# Patient Record
Sex: FEMALE | Race: WHITE | HISPANIC OR LATINO | Employment: UNEMPLOYED | URBAN - METROPOLITAN AREA
[De-identification: names, ages, dates, MRNs, and addresses within clinical notes are randomized per-mention and may not be internally consistent; named-entity substitution may affect disease eponyms.]

---

## 2017-03-11 ENCOUNTER — HOSPITAL ENCOUNTER (EMERGENCY)
Facility: HOSPITAL | Age: 32
Discharge: HOME | End: 2017-03-12
Attending: EMERGENCY MEDICINE | Admitting: EMERGENCY MEDICINE
Payer: COMMERCIAL

## 2017-03-11 DIAGNOSIS — R68.89 FLU-LIKE SYMPTOMS: Primary | ICD-10-CM

## 2017-03-11 LAB
ALBUMIN SERPL BCP-MCNC: 3.9 G/DL (ref 3.5–5)
ALP SERPL-CCNC: 76 U/L (ref 46–116)
ALT SERPL W P-5'-P-CCNC: 38 U/L (ref 12–78)
ANION GAP SERPL CALCULATED.3IONS-SCNC: 10 MMOL/L (ref 4–13)
AST SERPL W P-5'-P-CCNC: 23 U/L (ref 5–45)
BASOPHILS # BLD AUTO: 0.07 THOUSANDS/ΜL (ref 0–0.1)
BASOPHILS NFR BLD AUTO: 1 % (ref 0–1)
BILIRUB SERPL-MCNC: 0.4 MG/DL (ref 0.2–1)
BILIRUB UR QL STRIP: NEGATIVE
BUN SERPL-MCNC: 14 MG/DL (ref 5–25)
CALCIUM SERPL-MCNC: 8.5 MG/DL (ref 8.3–10.1)
CHLORIDE SERPL-SCNC: 104 MMOL/L (ref 100–108)
CLARITY UR: CLEAR
CO2 SERPL-SCNC: 25 MMOL/L (ref 21–32)
COLOR UR: YELLOW
CREAT SERPL-MCNC: 0.68 MG/DL (ref 0.6–1.3)
EOSINOPHIL # BLD AUTO: 0.28 THOUSAND/ΜL (ref 0–0.61)
EOSINOPHIL NFR BLD AUTO: 3 % (ref 0–6)
ERYTHROCYTE [DISTWIDTH] IN BLOOD BY AUTOMATED COUNT: 13.7 % (ref 11.6–15.1)
GFR SERPL CREATININE-BSD FRML MDRD: >60 ML/MIN/1.73SQ M
GLUCOSE SERPL-MCNC: 90 MG/DL (ref 65–140)
GLUCOSE UR STRIP-MCNC: NEGATIVE MG/DL
HCG UR QL: NEGATIVE
HCT VFR BLD AUTO: 36.6 % (ref 34.8–46.1)
HGB BLD-MCNC: 12.2 G/DL (ref 11.5–15.4)
HGB UR QL STRIP.AUTO: NEGATIVE
KETONES UR STRIP-MCNC: NEGATIVE MG/DL
LEUKOCYTE ESTERASE UR QL STRIP: NEGATIVE
LIPASE SERPL-CCNC: 141 U/L (ref 73–393)
LYMPHOCYTES # BLD AUTO: 2.22 THOUSANDS/ΜL (ref 0.6–4.47)
LYMPHOCYTES NFR BLD AUTO: 20 % (ref 14–44)
MCH RBC QN AUTO: 27 PG (ref 26.8–34.3)
MCHC RBC AUTO-ENTMCNC: 33.3 G/DL (ref 31.4–37.4)
MCV RBC AUTO: 81 FL (ref 82–98)
MONOCYTES # BLD AUTO: 1.29 THOUSAND/ΜL (ref 0.17–1.22)
MONOCYTES NFR BLD AUTO: 11 % (ref 4–12)
NEUTROPHILS # BLD AUTO: 7.51 THOUSANDS/ΜL (ref 1.85–7.62)
NEUTS SEG NFR BLD AUTO: 66 % (ref 43–75)
NITRITE UR QL STRIP: NEGATIVE
NRBC BLD AUTO-RTO: 0 /100 WBCS
PH UR STRIP.AUTO: 6 [PH] (ref 4.5–8)
PLATELET # BLD AUTO: 230 THOUSANDS/UL (ref 149–390)
PMV BLD AUTO: 10.7 FL (ref 8.9–12.7)
POTASSIUM SERPL-SCNC: 3.8 MMOL/L (ref 3.5–5.3)
PROT SERPL-MCNC: 7.2 G/DL (ref 6.4–8.2)
PROT UR STRIP-MCNC: NEGATIVE MG/DL
RBC # BLD AUTO: 4.52 MILLION/UL (ref 3.81–5.12)
SODIUM SERPL-SCNC: 139 MMOL/L (ref 136–145)
SP GR UR STRIP.AUTO: 1.02 (ref 1–1.03)
UROBILINOGEN UR QL STRIP.AUTO: 0.2 E.U./DL
WBC # BLD AUTO: 11.41 THOUSAND/UL (ref 4.31–10.16)

## 2017-03-11 PROCEDURE — 81003 URINALYSIS AUTO W/O SCOPE: CPT | Performed by: EMERGENCY MEDICINE

## 2017-03-11 PROCEDURE — 81025 URINE PREGNANCY TEST: CPT | Performed by: EMERGENCY MEDICINE

## 2017-03-11 PROCEDURE — 83690 ASSAY OF LIPASE: CPT | Performed by: EMERGENCY MEDICINE

## 2017-03-11 PROCEDURE — 80053 COMPREHEN METABOLIC PANEL: CPT | Performed by: EMERGENCY MEDICINE

## 2017-03-11 PROCEDURE — 96374 THER/PROPH/DIAG INJ IV PUSH: CPT

## 2017-03-11 PROCEDURE — 36415 COLL VENOUS BLD VENIPUNCTURE: CPT | Performed by: EMERGENCY MEDICINE

## 2017-03-11 PROCEDURE — 85025 COMPLETE CBC W/AUTO DIFF WBC: CPT | Performed by: EMERGENCY MEDICINE

## 2017-03-11 PROCEDURE — 96375 TX/PRO/DX INJ NEW DRUG ADDON: CPT

## 2017-03-11 RX ORDER — LORAZEPAM 2 MG/ML
1 INJECTION INTRAMUSCULAR ONCE
Status: COMPLETED | OUTPATIENT
Start: 2017-03-11 | End: 2017-03-11

## 2017-03-11 RX ORDER — SULFASALAZINE 500 MG/1
500 TABLET ORAL 2 TIMES DAILY
COMMUNITY
End: 2017-08-21

## 2017-03-11 RX ORDER — MORPHINE SULFATE 10 MG/ML
6 INJECTION, SOLUTION INTRAMUSCULAR; INTRAVENOUS ONCE
Status: COMPLETED | OUTPATIENT
Start: 2017-03-11 | End: 2017-03-11

## 2017-03-11 RX ADMIN — LORAZEPAM 1 MG: 2 INJECTION INTRAMUSCULAR; INTRAVENOUS at 23:28

## 2017-03-11 RX ADMIN — MORPHINE SULFATE 6 MG: 10 INJECTION, SOLUTION INTRAMUSCULAR; INTRAVENOUS at 23:28

## 2017-03-12 VITALS
HEIGHT: 63 IN | DIASTOLIC BLOOD PRESSURE: 73 MMHG | TEMPERATURE: 98 F | OXYGEN SATURATION: 100 % | RESPIRATION RATE: 17 BRPM | HEART RATE: 69 BPM | WEIGHT: 125.88 LBS | SYSTOLIC BLOOD PRESSURE: 114 MMHG | BODY MASS INDEX: 22.3 KG/M2

## 2017-03-12 PROCEDURE — 99284 EMERGENCY DEPT VISIT MOD MDM: CPT

## 2017-03-12 RX ORDER — NAPROXEN 500 MG/1
500 TABLET ORAL 2 TIMES DAILY WITH MEALS
Qty: 14 TABLET | Refills: 0 | Status: SHIPPED | OUTPATIENT
Start: 2017-03-12 | End: 2017-08-21

## 2017-08-21 ENCOUNTER — HOSPITAL ENCOUNTER (EMERGENCY)
Facility: HOSPITAL | Age: 32
Discharge: HOME | End: 2017-08-21
Attending: EMERGENCY MEDICINE
Payer: COMMERCIAL

## 2017-08-21 VITALS
WEIGHT: 124.78 LBS | TEMPERATURE: 98.3 F | HEART RATE: 83 BPM | DIASTOLIC BLOOD PRESSURE: 82 MMHG | SYSTOLIC BLOOD PRESSURE: 120 MMHG | RESPIRATION RATE: 16 BRPM | BODY MASS INDEX: 22.1 KG/M2 | OXYGEN SATURATION: 98 %

## 2017-08-21 DIAGNOSIS — T78.40XA ALLERGIC REACTION, INITIAL ENCOUNTER: Primary | ICD-10-CM

## 2017-08-21 PROCEDURE — 96375 TX/PRO/DX INJ NEW DRUG ADDON: CPT

## 2017-08-21 PROCEDURE — 96372 THER/PROPH/DIAG INJ SC/IM: CPT

## 2017-08-21 PROCEDURE — 96361 HYDRATE IV INFUSION ADD-ON: CPT

## 2017-08-21 PROCEDURE — 96374 THER/PROPH/DIAG INJ IV PUSH: CPT

## 2017-08-21 PROCEDURE — 99284 EMERGENCY DEPT VISIT MOD MDM: CPT

## 2017-08-21 RX ORDER — DIPHENHYDRAMINE HYDROCHLORIDE 50 MG/ML
25 INJECTION INTRAMUSCULAR; INTRAVENOUS ONCE
Status: COMPLETED | OUTPATIENT
Start: 2017-08-21 | End: 2017-08-21

## 2017-08-21 RX ORDER — DIPHENHYDRAMINE HYDROCHLORIDE 50 MG/ML
50 INJECTION INTRAMUSCULAR; INTRAVENOUS ONCE
Status: DISCONTINUED | OUTPATIENT
Start: 2017-08-21 | End: 2017-08-21

## 2017-08-21 RX ORDER — METHYLPREDNISOLONE SODIUM SUCCINATE 125 MG/2ML
125 INJECTION, POWDER, LYOPHILIZED, FOR SOLUTION INTRAMUSCULAR; INTRAVENOUS ONCE
Status: COMPLETED | OUTPATIENT
Start: 2017-08-21 | End: 2017-08-21

## 2017-08-21 RX ORDER — EPINEPHRINE 0.3 MG/.3ML
0.3 INJECTION SUBCUTANEOUS ONCE
Qty: 2 EACH | Refills: 0 | Status: SHIPPED | OUTPATIENT
Start: 2017-08-21 | End: 2019-11-16

## 2017-08-21 RX ADMIN — SODIUM CHLORIDE 500 ML: 0.9 INJECTION, SOLUTION INTRAVENOUS at 19:05

## 2017-08-21 RX ADMIN — METHYLPREDNISOLONE SODIUM SUCCINATE 125 MG: 125 INJECTION, POWDER, FOR SOLUTION INTRAMUSCULAR; INTRAVENOUS at 19:04

## 2017-08-21 RX ADMIN — EPINEPHRINE 0.3 MG: 1 INJECTION, SOLUTION INTRAMUSCULAR; SUBCUTANEOUS at 19:04

## 2017-08-21 RX ADMIN — FAMOTIDINE 20 MG: 10 INJECTION, SOLUTION INTRAVENOUS at 19:08

## 2017-08-21 RX ADMIN — DIPHENHYDRAMINE HYDROCHLORIDE 25 MG: 50 INJECTION, SOLUTION INTRAMUSCULAR; INTRAVENOUS at 19:14

## 2017-10-14 ENCOUNTER — APPOINTMENT (EMERGENCY)
Dept: RADIOLOGY | Facility: HOSPITAL | Age: 32
End: 2017-10-14
Payer: COMMERCIAL

## 2017-10-14 ENCOUNTER — HOSPITAL ENCOUNTER (EMERGENCY)
Facility: HOSPITAL | Age: 32
Discharge: HOME | End: 2017-10-15
Attending: EMERGENCY MEDICINE
Payer: COMMERCIAL

## 2017-10-14 DIAGNOSIS — G43.909 MIGRAINE: Primary | ICD-10-CM

## 2017-10-14 DIAGNOSIS — R07.89 CHEST PAIN, NON-CARDIAC: ICD-10-CM

## 2017-10-14 LAB
BASOPHILS # BLD AUTO: 0.05 THOUSANDS/ΜL (ref 0–0.1)
BASOPHILS NFR BLD AUTO: 1 % (ref 0–1)
EOSINOPHIL # BLD AUTO: 0.26 THOUSAND/ΜL (ref 0–0.61)
EOSINOPHIL NFR BLD AUTO: 3 % (ref 0–6)
ERYTHROCYTE [DISTWIDTH] IN BLOOD BY AUTOMATED COUNT: 14.1 % (ref 11.6–15.1)
HCT VFR BLD AUTO: 34.3 % (ref 34.8–46.1)
HGB BLD-MCNC: 11.4 G/DL (ref 11.5–15.4)
LYMPHOCYTES # BLD AUTO: 2.63 THOUSANDS/ΜL (ref 0.6–4.47)
LYMPHOCYTES NFR BLD AUTO: 30 % (ref 14–44)
MCH RBC QN AUTO: 26.8 PG (ref 26.8–34.3)
MCHC RBC AUTO-ENTMCNC: 33.2 G/DL (ref 31.4–37.4)
MCV RBC AUTO: 81 FL (ref 82–98)
MONOCYTES # BLD AUTO: 0.71 THOUSAND/ΜL (ref 0.17–1.22)
MONOCYTES NFR BLD AUTO: 8 % (ref 4–12)
NEUTROPHILS # BLD AUTO: 5.22 THOUSANDS/ΜL (ref 1.85–7.62)
NEUTS SEG NFR BLD AUTO: 59 % (ref 43–75)
NRBC BLD AUTO-RTO: 0 /100 WBCS
PLATELET # BLD AUTO: 292 THOUSANDS/UL (ref 149–390)
PMV BLD AUTO: 10.5 FL (ref 8.9–12.7)
RBC # BLD AUTO: 4.25 MILLION/UL (ref 3.81–5.12)
WBC # BLD AUTO: 8.89 THOUSAND/UL (ref 4.31–10.16)

## 2017-10-14 PROCEDURE — 96361 HYDRATE IV INFUSION ADD-ON: CPT

## 2017-10-14 PROCEDURE — 80053 COMPREHEN METABOLIC PANEL: CPT | Performed by: EMERGENCY MEDICINE

## 2017-10-14 PROCEDURE — 71020 HB CHEST X-RAY 2VW FRONTAL&LATL: CPT

## 2017-10-14 PROCEDURE — 36415 COLL VENOUS BLD VENIPUNCTURE: CPT | Performed by: EMERGENCY MEDICINE

## 2017-10-14 PROCEDURE — 93005 ELECTROCARDIOGRAM TRACING: CPT | Performed by: EMERGENCY MEDICINE

## 2017-10-14 PROCEDURE — 96374 THER/PROPH/DIAG INJ IV PUSH: CPT

## 2017-10-14 PROCEDURE — 84484 ASSAY OF TROPONIN QUANT: CPT | Performed by: EMERGENCY MEDICINE

## 2017-10-14 PROCEDURE — 96375 TX/PRO/DX INJ NEW DRUG ADDON: CPT

## 2017-10-14 PROCEDURE — 85025 COMPLETE CBC W/AUTO DIFF WBC: CPT | Performed by: EMERGENCY MEDICINE

## 2017-10-14 RX ORDER — GABAPENTIN 100 MG/1
100 CAPSULE ORAL 3 TIMES DAILY
COMMUNITY
End: 2018-06-02

## 2017-10-14 RX ORDER — ONDANSETRON 2 MG/ML
4 INJECTION INTRAMUSCULAR; INTRAVENOUS ONCE AS NEEDED
Status: COMPLETED | OUTPATIENT
Start: 2017-10-14 | End: 2017-10-14

## 2017-10-14 RX ORDER — KETOROLAC TROMETHAMINE 30 MG/ML
15 INJECTION, SOLUTION INTRAMUSCULAR; INTRAVENOUS ONCE
Status: COMPLETED | OUTPATIENT
Start: 2017-10-14 | End: 2017-10-14

## 2017-10-14 RX ORDER — DIPHENHYDRAMINE HYDROCHLORIDE 50 MG/ML
25 INJECTION INTRAMUSCULAR; INTRAVENOUS ONCE
Status: COMPLETED | OUTPATIENT
Start: 2017-10-14 | End: 2017-10-14

## 2017-10-14 RX ORDER — METOCLOPRAMIDE HYDROCHLORIDE 5 MG/ML
10 INJECTION INTRAMUSCULAR; INTRAVENOUS ONCE
Status: COMPLETED | OUTPATIENT
Start: 2017-10-14 | End: 2017-10-14

## 2017-10-14 RX ADMIN — DIPHENHYDRAMINE HYDROCHLORIDE 25 MG: 50 INJECTION, SOLUTION INTRAMUSCULAR; INTRAVENOUS at 23:48

## 2017-10-14 RX ADMIN — METOCLOPRAMIDE 10 MG: 5 INJECTION, SOLUTION INTRAMUSCULAR; INTRAVENOUS at 23:46

## 2017-10-14 RX ADMIN — SODIUM CHLORIDE 1000 ML: 0.9 INJECTION, SOLUTION INTRAVENOUS at 23:42

## 2017-10-14 RX ADMIN — KETOROLAC TROMETHAMINE 15 MG: 30 INJECTION, SOLUTION INTRAMUSCULAR at 23:49

## 2017-10-14 RX ADMIN — ONDANSETRON 4 MG: 2 INJECTION INTRAMUSCULAR; INTRAVENOUS at 23:44

## 2017-10-15 VITALS
SYSTOLIC BLOOD PRESSURE: 121 MMHG | BODY MASS INDEX: 22.62 KG/M2 | HEIGHT: 63 IN | DIASTOLIC BLOOD PRESSURE: 75 MMHG | WEIGHT: 127.65 LBS | OXYGEN SATURATION: 100 % | TEMPERATURE: 98.3 F | HEART RATE: 63 BPM | RESPIRATION RATE: 17 BRPM

## 2017-10-15 LAB
ALBUMIN SERPL BCP-MCNC: 3.9 G/DL (ref 3.5–5)
ALP SERPL-CCNC: 69 U/L (ref 46–116)
ALT SERPL W P-5'-P-CCNC: 25 U/L (ref 12–78)
ANION GAP SERPL CALCULATED.3IONS-SCNC: 6 MMOL/L (ref 4–13)
AST SERPL W P-5'-P-CCNC: 21 U/L (ref 5–45)
ATRIAL RATE: 70 BPM
ATRIAL RATE: 76 BPM
BILIRUB SERPL-MCNC: 0.6 MG/DL (ref 0.2–1)
BUN SERPL-MCNC: 11 MG/DL (ref 5–25)
CALCIUM SERPL-MCNC: 8.4 MG/DL (ref 8.3–10.1)
CHLORIDE SERPL-SCNC: 104 MMOL/L (ref 100–108)
CO2 SERPL-SCNC: 28 MMOL/L (ref 21–32)
CREAT SERPL-MCNC: 0.72 MG/DL (ref 0.6–1.3)
GFR SERPL CREATININE-BSD FRML MDRD: 110 ML/MIN/1.73SQ M
GLUCOSE SERPL-MCNC: 101 MG/DL (ref 65–140)
P AXIS: 76 DEGREES
P AXIS: 82 DEGREES
POTASSIUM SERPL-SCNC: 3.6 MMOL/L (ref 3.5–5.3)
PR INTERVAL: 150 MS
PR INTERVAL: 152 MS
PROT SERPL-MCNC: 7.2 G/DL (ref 6.4–8.2)
QRS AXIS: 59 DEGREES
QRS AXIS: 68 DEGREES
QRSD INTERVAL: 100 MS
QRSD INTERVAL: 98 MS
QT INTERVAL: 384 MS
QT INTERVAL: 386 MS
QTC INTERVAL: 414 MS
QTC INTERVAL: 434 MS
SODIUM SERPL-SCNC: 138 MMOL/L (ref 136–145)
T WAVE AXIS: 43 DEGREES
T WAVE AXIS: 55 DEGREES
TROPONIN I SERPL-MCNC: <0.02 NG/ML
TROPONIN I SERPL-MCNC: <0.02 NG/ML
VENTRICULAR RATE: 70 BPM
VENTRICULAR RATE: 76 BPM

## 2017-10-15 PROCEDURE — 99285 EMERGENCY DEPT VISIT HI MDM: CPT

## 2017-10-15 PROCEDURE — 84484 ASSAY OF TROPONIN QUANT: CPT | Performed by: EMERGENCY MEDICINE

## 2017-10-15 PROCEDURE — 36415 COLL VENOUS BLD VENIPUNCTURE: CPT | Performed by: EMERGENCY MEDICINE

## 2017-10-15 PROCEDURE — 96361 HYDRATE IV INFUSION ADD-ON: CPT

## 2017-10-15 PROCEDURE — 93005 ELECTROCARDIOGRAM TRACING: CPT | Performed by: EMERGENCY MEDICINE

## 2017-10-15 NOTE — DISCHARGE INSTRUCTIONS
Please use Tylenol, ibuprofen at home for control of your pain  Please follow-up with primary care provider regarding care for your migraines  Chest Wall Pain, Ambulatory Care   GENERAL INFORMATION:   Chest wall pain  may be caused by problems with the muscles, cartilage, or bones of the chest wall  Chest wall pain may also be caused by pain that spreads to your chest from another part of your body  The pain may be aching, severe, dull, or sharp  It may come and go, or it may be constant  The pain may be worse when you move in certain ways, breathe deeply, or cough  Seek immediate care for the following symptoms:   · Severe pain    · You have any of the following signs of a heart attack:      ¨ Squeezing, pressure, or pain in your chest that lasts longer than 5 minutes or returns    ¨ Discomfort or pain in your back, neck, jaw, stomach, or arm     ¨ Trouble breathing    ¨ Nausea or vomiting    ¨ Lightheadedness or a sudden cold sweat, especially with chest pain or trouble breathing  Treatment  depends on the cause of your chest wall pain  You may need any of the following:  · NSAIDs  help decrease swelling and pain or fever  This medicine is available with or without a doctor's order  NSAIDs can cause stomach bleeding or kidney problems in certain people  If you take blood thinner medicine, always ask your healthcare provider if NSAIDs are safe for you  Always read the medicine label and follow directions  · Acetaminophen  decreases pain  It is available without a doctor's order  Ask how much to take and how often to take it  Follow directions  Acetaminophen can cause liver damage if not taken correctly  · Apply heat  on your chest for 20 to 30 minutes every 2 hours for as many days as directed  Heat helps decrease pain and muscle spasms  · Apply ice  on your chest for 15 to 20 minutes every hour or as directed  Use an ice pack, or put crushed ice in a plastic bag  Cover it with a towel   Ice helps prevent tissue damage and decreases swelling and pain  Follow up with your healthcare provider as directed:  Write down your questions so you remember to ask them during your visits  CARE AGREEMENT:   You have the right to help plan your care  Learn about your health condition and how it may be treated  Discuss treatment options with your caregivers to decide what care you want to receive  You always have the right to refuse treatment  The above information is an  only  It is not intended as medical advice for individual conditions or treatments  Talk to your doctor, nurse or pharmacist before following any medical regimen to see if it is safe and effective for you  © 2014 [address] is for End User's use only and may not be sold, redistributed or otherwise used for commercial purposes  All illustrations and images included in CareNotes® are the copyrighted property of [employer]  or Grant Holliday  Migraine Headache   WHAT YOU NEED TO KNOW:   What is a migraine headache? A migraine is a severe headache  The pain can be so severe that it interferes with your daily activities  A migraine can last a few hours up to several days  The exact cause of migraines is not known  What can trigger a migraine headache? · Stress, eye strain, oversleeping, or not getting enough sleep    · Hormone changes in women from birth control pills, pregnancy, menopause, or during a monthly period    · Skipping meals, going too long without eating, or not drinking enough liquids    · Certain foods or drinks such as chocolate, hard cheese, red wine, or drinks that contain caffeine    · Foods that contain gluten, nitrates, MSG, or artificial sweeteners    · Sunlight, bright or flashing lights, loud noises, smoke, or strong smells    · Heat, humidity, or changes in the weather  What are the warning signs that a migraine headache is about to start?   Warning signs usually start 15 to 60 minutes before the headache:  · Visual changes (auras), such as blurred vision, temporary blind or bright spots, lines, or hallucinations    · Unusual tiredness or frequent yawning    · Tingling in an arm or leg  What are the signs and symptoms of a migraine headache? A migraine headache usually begins as a dull ache around the eye or temple  The pain may get worse with movement  You may also have the following:  · Pain in your head that may increase to the point that you cannot do everyday activities    · Pain on one or both sides of your head    · Throbbing, pulsing, or pounding pain in your head    · Nausea and vomiting    · Sensitivity to light, noise, or smells  How is a migraine headache diagnosed? Your healthcare provider will ask questions about your headaches  Describe the pain and any other symptoms, such as nausea  Tell the provider if you think anything triggered the pain  The provider will also want to know what you ate and drank before the pain started  Tell the provider about any medical conditions you have or that run in your family  Include any recent stressors you have had  You may also need any of the following:  · A neurologic exam  is used to check how your pupils react to light  Your healthcare provider may check your memory, hand grasp, and balance  · CT or MRI pictures  may be taken of your brain  You may be given contrast liquid to help your brain show up better in the pictures  Tell the healthcare provider if you have ever had an allergic reaction to contrast liquid  Do not enter the MRI room with anything metal  Metal can cause serious injury  Tell the healthcare provider if you have any metal in or on your body  How is a migraine headache treated? Migraines cannot be cured  The goal of treatment is to reduce your symptoms  Take medicine as soon as you feel a migraine begin  · Prescription pain medicine  may be given   Do not wait until the pain is severe before you take your medicine  · Migraine medicines  are used to help prevent a migraine or stop it once it starts  · Antinausea medicine  may be given to calm your stomach and to help prevent vomiting  This medicine can also help relieve pain  What can I do to manage my symptoms? · Rest in a dark, quiet room  This will help decrease your pain  Sleep may also help relieve the pain  · Apply ice to decrease pain  Use an ice pack, or put crushed ice in a plastic bag  Cover the ice pack with a towel and place it on your head  Apply ice for 15 to 20 minutes every hour  · Apply heat to decrease pain and muscle spasms  Use a small towel dampened with warm water or a heating pad, or sit in a warm bath  Apply heat on the area for 20 to 30 minutes every 2 hours  You may alternate heat and ice  · Keep a migraine record  Write down when your migraines start and stop  Include your symptoms and what you were doing when a migraine began  Record what you ate or drank for 24 hours before the migraine started  Keep track of what you did to treat your migraine and if it worked  Bring the migraine record with you to visits with your healthcare provider  What can I do to prevent another migraine headache? · Do not smoke  Nicotine and other chemicals in cigarettes and cigars can trigger a migraine or make it worse  Ask your healthcare provider for information if you currently smoke and need help to quit  E-cigarettes or smokeless tobacco still contain nicotine  Talk to your healthcare provider before you use these products  · Do not drink alcohol  Alcohol can trigger a migraine  It can also keep medicines used to treat your migraines from working  · Get regular exercise  Exercise may help prevent migraines  Talk to your healthcare provider about the best exercise plan for you  Try to get at least 30 minutes of exercise on most days  · Manage stress  Stress may trigger a migraine   Learn new ways to relax, such as deep breathing  · Create a sleep schedule  Go to bed and get up at the same times each day  Do not watch television before bed  · Eat regular meals  Include healthy foods such as include fruit, vegetables, whole-grain breads, low-fat dairy products, beans, lean meat, and fish  Do not have food or drinks that trigger your migraines  When should I seek immediate care? · You have a headache that seems different or much worse than your usual migraine headache  · You have a severe headache with a fever or a stiff neck  · You have new problems with speech, vision, balance, or movement  · You feel like you are going to faint, you become confused, or you have a seizure  When should I contact my healthcare provider? · Your migraines interfere with your daily activities  · Your medicines or treatments stop working  · You have questions or concerns about your condition or care  CARE AGREEMENT:   You have the right to help plan your care  Learn about your health condition and how it may be treated  Discuss treatment options with your caregivers to decide what care you want to receive  You always have the right to refuse treatment  The above information is an  only  It is not intended as medical advice for individual conditions or treatments  Talk to your doctor, nurse or pharmacist before following any medical regimen to see if it is safe and effective for you  © 2017 [address] Information is for End User's use only and may not be sold, redistributed or otherwise used for commercial purposes  All illustrations and images included in CareNotes® are the copyrighted property of [employer]  or [address]  Dolor en la pared torácica   LO QUE NECESITA SABER:   ¿Qué necesito saber sobre el dolor de la pared torácica?   El dolor de la pared torácica puede ser causado por problemas con los músculos, cartílago o huesos de la pared torácica  El dolor de la pared torácica también puede ser causado por dolor que se propaga a mercer pecho y que proviene de otra parte de mercer cuerpo  El dolor puede ser persistente, severo, leve o eli  Puede ser intermitente (va y viene) o puede ser persistente  El dolor también puede ser peor cuando usted se mueve de ciertas formas, respira profundo o tose  ¿Qué causa el dolor de la pared torácica? · Condiciones que afectan las articulaciones o el cartílago de la pared torácica, jemma la artritis o costocondritis    · Presión o lesión en los músculos de la pared torácica     · Fracturas de las costillas o vértebras (huesos de la columna vertebral)    · Hernias discales en la sección superior o media de mercer columna vertebral     · Culebrilla  ¿Cómo se diagnostica el dolor de la pared torácica? Mercer médico le pedirá que describa mercer dolor  Dígale cuando empezó el dolor, qué tipo de dolor es y qué es lo que mejora o empeora el dolor  Él también le preguntará si tiene otros síntomas  Pete Phy el tórax  Es probable que [employer] pida que mueva anuj brazos en direcciones distintas para hill cómo afectan mercer dolor  Pregúntele a mercer médico sobre estos y otros exámenes que usted pueda necesitar:  · Olson Conradi de tórax  pueden mostrar la causa de mercer dolor de la pared torácica  Es probable que usted necesite más de Wesley Conradi  · Maicol resonancia magnética (RM)  sincere imágenes de mercer pecho o columna vertebral para mostrar la causa de mercer dolor  Es posible que le administren un líquido de contraste para que las imágenes se aprecien mejor  Informe a mercer médico si alguna vez ha tenido maicol reacción alérgica al medio de [place]  No entre a la peter donde se realiza la resonancia magnética con algo de metal  El metal puede causar lesiones serias  Informe a mercer médico si tiene algún metal dentro o sobre mercer cuerpo  ¿Cómo se trata el dolor de la pared torácica? El tratamiento depende la causa de mercer dolor   Es posible que usted necesite alguno de los siguientes:  · AINEs (Analgésicos antiinflamatorios no esteroides) jemma el ibuprofeno, ayudan a disminuir la inflamación, el dolor y la fiebre  Imani medicamento esta disponible con o sin celia receta médica  Los AINEs pueden causar sangrado estomacal o problemas renales en ciertas personas  Si usted sincere un medicamento anticoagulante, siempre pregúntele a mercer médico si los JOSHUA son seguros para usted  Siempre benji la etiqueta de imani medicamento y [place] instrucciones  · El acetaminofén  [employer]  Está disponible sin receta médica  Pregunte la cantidad y la frecuencia con que debe tomarlos  [facility]  El acetaminofén puede causar daño en el hígado cuando no se sincree de forma correcta  · Celia crema  se puede aplicar en mercer pecho para aliviar el dolor  · La aplicación de calor  a mercer pecho de 20 a 30 minutos cada 2 horas por los [employer] indiquen  El calor ayuda a disminuir el dolor y los espasmos musculares  · Aplique hielo  a mercer pecho de 15 a 20 minutos cada hora según indicaciones  Use un paquete de hielo o ponga hielo molido dentro de [employer]  Cúbrala con celia toalla  El hielo ayuda a evitar daño al tejido y a disminuir la inflamación y el dolor  Llame al 911 si presenta:   · Usted tiene alguno de los siguientes signos de un ataque cardíaco:      ¨ Estornudos, presión, o dolor en mercer pecho que dura mas de 5 minutos o regresa  ¨ Malestar o dolor en mercer espalda, blanche, mandíbula, abdomen, o brazo     ¨ Dificultad para respirar    ¨ Náuseas o vómito    ¨ Siente un desvanecimiento o tiene sudores fríos especialmente en el pecho o dificultad para respirar  ¿Cuándo scott buscar atención inmediata? · Usted tiene dolor intenso  ¿Cuándo scott comunicarme con mi médico?   · Usted desarrolla un sarpullido  · Usted tiene otros síntomas nuevos  · Mercer dolor no mejora, aún con tratamiento      · Usted tiene preguntas o inquietudes acerca de mercer condición o Angel Kaiser  ACUERDOS SOBRE MEDEROS CUIDADO:   Usted tiene el derecho de ayudar a planear mederos cuidado  Aprenda todo lo que pueda sobre mederos condición y jemma darle tratamiento  Discuta anuj opciones de tratamiento con anuj médicos para decidir el cuidado que usted desea recibir  Usted siempre tiene el derecho de rechazar el tratamiento  Esta información es sólo para uso en educación  Mederos intención no es darle un consejo médico sobre enfermedades o tratamientos  Colsulte con mederos Alben Teressa farmacéutico antes de seguir cualquier régimen médico para saber si es seguro y efectivo para usted  © 2017 [address] Information is for End User's use only and may not be sold, redistributed or otherwise used for commercial purposes  All illustrations and images included in CareNotes® are the copyrighted property of [employer] A M [employer] Inc  or Grant Holliday  Migraña   LO QUE NECESITA SABER:   ¿Qué es maicol Gladys Haste? Mcdaniel es un dolor de manuel intenso  El dolor puede ser tan severo que interfiere con anuj actividades cotidianas  Mcdaniel puede durar desde pocas horas hasta varios días  La causa exacta de la migraña no es conocida  ¿Qué puede desencadenar maicol Gladys Haste? · El estrés, fatiga de los ojos, dormir demasiado o no dormir lo suficiente    · [employer] debido a las píldoras anticonceptivas, embarazo, menopausia o darrick la menstruación    · Omitir comidas, pasar mucho tiempo sin comer o no lu suficientes líquidos    · Ciertos alimentos o bebidas jemma el chocolate, el queso darcy, vino tinto o bebidas que contienen cafeína    · Alimentos que contienen gluten, nitratos, glutamato monosódico o endulzantes artificiales    · [employer], luces brillantes o luces parpadeantes, ruidos dixie, humo u olores dixie    · Calor, humedad o cambios en el clima  ¿Cuáles son las señales de advertencia de que maicol migraña está por comenzar?   Los signos de advertencia generalmente comienzan de 15 a 60 minutos antes del dolor de manuel:  · Cambios visuales (auras), jemma visión borrosa, ceguera temporal o manchas brillantes, líneas o alucinaciones    · Cansancio anormal o bostezos frecuentes    · Hormigueo en mercer brazo o pierna  ¿Cuáles son los signos y síntomas de Job Early migraña? La migraña comienza generalmente con un dolor monótono alrededor del lencho o la sien  El dolor podría empeorar con el movimiento  Usted también podría tener lo siguiente:  · Dolor en mercer manuel que podría aumentar hasta el punto que usted no es capaz de realizar anuj actividades cotidianas    · Dolor en natanael o ambos lados de mercer manuel    · Dolor punzante, pulsante o satinder en la manuel    · Náuseas y vómitos    · Sensibilidad a la sera, el ruido o los olores  ¿Cómo se diagnostica maicol Verlinda Splinter? Mercer médico le hará preguntas acerca de anuj yenny de [place]  Leontine Lock dolor y otros síntomas, jemma náuseas  Infórmele al médico si usted genoveva que hubo algún desencadenante del dolor  El médico también querrá saber qué fue usted comió y tomó antes que le empezara el dolor  Infórmele al médico sobre cualquier afección médica que usted tenga o sea característica de mercer karla  Incluya cualquier estresor reciente que ha tenido  Es posible que también necesite alguno de los siguientes tratamientos:  · Un examen neurológico  se [place] para revisar cómo reaccionan las pupilas a la sera  Mercer médico podría revisar mercer memoria, la forma en que agarra las cosas con mercer mano y el equilibrio  · Las imágenes por tomografía computarizada o por resonancia magnética  podrían lu imágenes de mercer cerebro  Es posible que le administren un medio de contraste que sirve para que el cerebro se observe con mayor claridad en las imágenes  Dígale al médico si usted alguna vez ha tenido maicol reacción alérgica al líquido de [place]  No entre a la peter donde se realiza la resonancia magnética con algo de metal  El metal puede causar lesiones serias   Dígale al médico si usted tiene algo de metal por dentro o sobre mercer cuerpo  ¿Cómo se trata Rosamaria Mooneyier? Las migrañas no [place]  La meta del tratamiento es reducir anuj síntomas  [place] medicamento tan pronto jemma sienta que le comienza Rosamaria Sangeeta  · Un medicamento con receta para el dolor  podrían ser [place]  No espere a que el dolor sea muy intenso para lu el medicamento  · Medicamentos para la migraña  se [place] para evitar maicol migraña o detenerla maicol vez que comience  · Los medicamentos contra las náuseas  pueden darse para calmar mercer estómago y ayudarle a prevenir los vómitos  [place] medicamento también puede aliviar el dolor  ¿Qué puedo hacer para manejar mis síntomas? · Repose en maicol habitación oscura y [place] ayudará a disminuir el dolor  El dormir también podría ayudarlo a aliviar mercer dolor  · Aplique hielo para reducir el dolor  Use un paquete de hielo o ponga hielo molido dentro de [employer]  Cubra el paquete de hielo con maicol toalla y colóqueselo en la manuel  Aplique hielo darrick 15 a 20 minutos cada hora  · Aplique calor para disminuir el dolor y los espasmos musculares  Utilice maicol toalla pequeña empapada con [place], maicol almohada térmica o tome un baño de zuleyma con agua tibia  Aplique la compresa caliente sobre el área por 20 a 30 minutos cada 2 horas  Usted puede alternar el calor y el hielo  · Mantenga un registro de las migrañas  Escriba cuándo comienzan y terminan anuj migrañas  [place] y [place] haciendo cuando comenzó Rosamaria Sangeeta  Registre lo que comió y lo que tomó las 24 horas antes de que comenzó merecr migraña  Mantenga un registro de lo que hizo para tratar mercer migraña y si funcionó  Pradeep Mowers el registro de las migrañas con usted a las citas con mercer médico   ¿Qué puedo hacer para evitar otra migraña? · No fume  La nicotina y otras sustancias químicas en los cigarrillos y puros pueden desencadenar maicol Alba Starring   Pida información a mercer médico si usted actualmente fuma y necesita ayuda para dejar de fumar  Los cigarrillos electrónicos o tabaco sin humo todavía contienen nicotina  Consulte con mercer médico antes de [employer]  · No consuma alcohol  El alcohol puede provocar migraña  También puede impedir que [employer] medicamentos para la migraña  · Ejercítese regularmente  El ejercicio puede ayudar a evitar migrañas  Consulte con mercer médico acerca de cuál es el mejor régimen de ejercicio para usted  Trate de hacer unos 30 minutos de ejercicio todos los días que pueda  · Controle el estrés  El estrés podría provocar migraña  Aprenda nuevas maneras de relajarse jemma la respiración profunda  · Establezca un horario para dormir  Acuéstese y levántese a la misma hora cada día  No alex televisión inmediatamente antes de acostarse  · Coma lexie comidas regularmente  Incluya alimentos [employer] fruta, verduras, panes de grano entero, productos lácteos bajos en grasa, frijoles, carne magra y pescado  No consuma alimentos o bebidas que puedan desencadenar lexie migrañas  ¿Cuándo scott buscar atención inmediata? · Usted tiene dolor de manuel que parece ser diferente o mucho peor que mercer migraña habitual     · Usted tiene un dolor de manuel severo con fiebre o rigidez en el blanche  · Usted tiene nuevos problemas con el habla, la visión, el equilibrio o el [address]  · Usted siente que se va a desmayar, se siente confundido o sufre maicol convulsión  ¿Cuándo scott comunicarme con mi médico?   · Mercer migraña interfiere con lexie actividades cotidianas  · Lexie medicamentos o tratamientos paulina de funcionar  · Usted tiene preguntas o inquietudes acerca de mercer condición o cuidado  ACUERDOS SOBRE MERCER CUIDADO:   Usted tiene el derecho de ayudar a planear mercer cuidado  Aprenda todo lo que pueda sobre mercer condición y jemma darle tratamiento   Discuta lexie opciones de tratamiento con lexie médicos para decidir el cuidado que usted desea recibir  Usted siempre tiene el derecho de rechazar el tratamiento  Esta información es sólo para uso en educación  Mercer intención no es darle un consejo médico sobre enfermedades o tratamientos  Colsulte con mercer [place] farmacéutico antes de seguir cualquier régimen médico para saber si es seguro y efectivo para usted  © 2017 [facility] [facility] Information is for End User's use only and may not be sold, redistributed or otherwise used for commercial purposes  All illustrations and images included in CareNotes® are the copyrighted property of A [employer]ED[employer]  or Grant Holliday

## 2017-10-15 NOTE — ED PROVIDER NOTES
History  Chief Complaint   Patient presents with    Chest Pain     Pt c/o of stabbing chest pain since 9pm and nausea and vomiting all day  49-year-old female presents for evaluation of headache, chest pain, nausea, vomiting  She states that her headache feels consistent with migraine  She is photophobic and states that this feels similar to other migraines, just stronger  It was a gradual onset, no acute onset  Has been getting worse throughout the day, was not maximal at onset  No associated numbness, tingling, weakness  No focal neuro deficits  No trauma to head/neck, no chest trauma, no abd trauma  In addition she has had chest pain since 21:00 which she complains is stabbing, no radiation  Not associated with shortness of breath  She has been nauseous and vomiting all day and continues to have nausea and vomiting  She does not have a cardiac history  The pain history is complicated by a history of early-onset arthritis and fibromyalgia  Her  states that she appears more comfortable than usual fibromuyalgia flairs  Prior to Admission Medications   Prescriptions Last Dose Informant Patient Reported? Taking? Cholecalciferol (VITAMIN D PO)   Yes Yes   Sig: Take by mouth   EPINEPHrine (EPIPEN) 0 3 mg/0 3 mL SOAJ   No Yes   Sig: Inject 0 3 mL into the shoulder, thigh, or buttocks once for 1 dose If needed for allergic reaction  Proceed immediately to ED if you use the EpiPen  FOLIC ACID PO   Yes Yes   Sig: Take by mouth   METHOTREXATE PO   Yes Yes   Sig: Take by mouth   gabapentin (NEURONTIN) 100 mg capsule   Yes Yes   Sig: Take 100 mg by mouth 3 (three) times a day      Facility-Administered Medications: None       Past Medical History:   Diagnosis Date    Arthritis     Asthma     Fibromyalgia     Rheumatoid arthritis ([facility] [address] )        Past Surgical History:   Procedure Laterality Date    TUBAL LIGATION      TUBAL LIGATION         History reviewed   No pertinent family history  I have reviewed and agree with the history as documented  Social History   Substance Use Topics    Smoking status: Never Smoker    Smokeless tobacco: Never Used    Alcohol use No        Review of Systems   Constitutional: Positive for fatigue  Negative for chills and fever  HENT: Negative for congestion, ear pain, sinus pain, sinus pressure and sore throat  Eyes: Positive for photophobia  Negative for visual disturbance  Respiratory: Negative for apnea, cough, chest tightness and shortness of breath  Cardiovascular: Positive for chest pain  Negative for palpitations  Gastrointestinal: Positive for nausea and vomiting  Negative for abdominal pain, constipation and diarrhea  Genitourinary: Negative for dysuria and flank pain  Musculoskeletal: Negative for back pain, neck pain and neck stiffness  Skin: Negative for color change, pallor and rash  Allergic/Immunologic: Negative for immunocompromised state  Neurological: Positive for weakness and headaches  Negative for dizziness, seizures, syncope and numbness  Physical Exam  ED Triage Vitals   Temperature Pulse Respirations Blood Pressure SpO2   10/14/17 2300 10/14/17 2258 10/14/17 2258 10/14/17 2258 10/14/17 2258   98 3 °F (36 8 °C) 71 18 122/84 100 %      Temp Source Heart Rate Source Patient Position - Orthostatic VS BP Location FiO2 (%)   10/14/17 2300 10/14/17 2258 10/14/17 2258 10/14/17 2258 --   Oral Monitor Lying Right arm       Pain Score       10/14/17 2258       Worst Possible Pain           Physical Exam   Constitutional: She is oriented to person, place, and time  She appears well-developed and well-nourished  She appears distressed (Secondary to pain and photophobia)  HENT:   Head: Normocephalic and atraumatic  Right Ear: External ear normal    Left Ear: External ear normal    Mouth/Throat: Oropharynx is clear and moist    Eyes: Conjunctivae and EOM are normal  Pupils are equal, round, and reactive to light  Right eye exhibits no discharge  Left eye exhibits no discharge  No scleral icterus  Photophobic on exam   Neck: Normal range of motion  Neck supple  No JVD present  Cardiovascular: Normal rate, regular rhythm, normal heart sounds and intact distal pulses  Exam reveals no gallop and no friction rub  No murmur heard  Pulmonary/Chest: Effort normal and breath sounds normal  No respiratory distress  She has no wheezes  She has no rales  She exhibits tenderness  Abdominal: Soft  Bowel sounds are normal  She exhibits no distension  There is no tenderness  There is no rebound and no guarding  Musculoskeletal: Normal range of motion  She exhibits no edema, tenderness or deformity  Neurological: She is alert and oriented to person, place, and time  No cranial nerve deficit  Skin: Skin is warm and dry  No rash noted  She is not diaphoretic  No erythema  No pallor  Psychiatric: She has a normal mood and affect  Her behavior is normal    Anxious regarding pain   Vitals reviewed        ED Medications  Medications   ondansetron (ZOFRAN) injection 4 mg (4 mg Intravenous Given 10/14/17 2344)   ketorolac (TORADOL) 30 mg/mL injection 15 mg (15 mg Intravenous Given 10/14/17 2349)   metoclopramide (REGLAN) injection 10 mg (10 mg Intravenous Given 10/14/17 2346)   diphenhydrAMINE (BENADRYL) injection 25 mg (25 mg Intravenous Given 10/14/17 2348)   sodium chloride 0 9 % bolus 1,000 mL (0 mL Intravenous Stopped 10/15/17 0229)       Diagnostic Studies  Labs Reviewed   CBC AND DIFFERENTIAL - Abnormal        Result Value Ref Range Status    Hemoglobin 11 4 (*) 11 5 - 15 4 g/dL Final    Hematocrit 34 3 (*) 34 8 - 46 1 % Final    MCV 81 (*) 82 - 98 fL Final    WBC 8 89  4 31 - 10 16 Thousand/uL Final    RBC 4 25  3 81 - 5 12 Million/uL Final    MCH 26 8  26 8 - 34 3 pg Final    MCHC 33 2  31 4 - 37 4 g/dL Final    RDW 14 1  11 6 - 15 1 % Final    MPV 10 5  8 9 - 12 7 fL Final    Platelets 026  170 - 390 Thousands/uL Final nRBC 0  /100 WBCs Final    Neutrophils Relative 59  43 - 75 % Final    Lymphocytes Relative 30  14 - 44 % Final    Monocytes Relative 8  4 - 12 % Final    Eosinophils Relative 3  0 - 6 % Final    Basophils Relative 1  0 - 1 % Final    Neutrophils Absolute 5 22  1 85 - 7 62 Thousands/µL Final    Lymphocytes Absolute 2 63  0 60 - 4 47 Thousands/µL Final    Monocytes Absolute 0 71  0 17 - 1 22 Thousand/µL Final    Eosinophils Absolute 0 26  0 00 - 0 61 Thousand/µL Final    Basophils Absolute 0 05  0 00 - 0 10 Thousands/µL Final   TROPONIN I - Normal    Troponin I <0 02  <=0 04 ng/mL Final    Comment: 3Autovalidation override    Narrative:     [employer] Chemistry analyzer 99% cutoff is > 0 04 ng/mL in network labs    o cTnI 99% cutoff is useful only when applied to patients in the clinical setting of myocardial ischemia  o cTnI 99% cutoff should be interpreted in the context of clinical history, ECG findings and possibly cardiac imaging to establish correct diagnosis  o cTnI 99% cutoff may be suggestive but clearly not indicative of a coronary event without the clinical setting of myocardial ischemia  TROPONIN I - Normal    Troponin I <0 02  <=0 04 ng/mL Final    Comment: 3Autovalidation override    Narrative:     [employer] Chemistry analyzer 99% cutoff is > 0 04 ng/mL in network labs    o cTnI 99% cutoff is useful only when applied to patients in the clinical setting of myocardial ischemia  o cTnI 99% cutoff should be interpreted in the context of clinical history, ECG findings and possibly cardiac imaging to establish correct diagnosis  o cTnI 99% cutoff may be suggestive but clearly not indicative of a coronary event without the clinical setting of myocardial ischemia     COMPREHENSIVE METABOLIC PANEL    Sodium 971  136 - 145 mmol/L Final    Potassium 3 6  3 5 - 5 3 mmol/L Final    Chloride 104  100 - 108 mmol/L Final    CO2 28  21 - 32 mmol/L Final    Anion Gap 6  4 - 13 mmol/L Final    BUN 11  5 - 25 mg/dL Final Creatinine 0 72  0 60 - 1 30 mg/dL Final    Comment: Standardized to IDMS reference method    Glucose 101  65 - 140 mg/dL Final    Comment:   If the patient is fasting, the ADA then defines impaired fasting glucose as > 100 mg/dL and diabetes as > or equal to 123 mg/dL  Specimen collection should occur prior to Sulfasalazine administration due to the potential for falsely depressed results  Specimen collection should occur prior to Sulfapyridine administration due to the potential for falsely elevated results  Calcium 8 4  8 3 - 10 1 mg/dL Final    AST 21  5 - 45 U/L Final    Comment:   Specimen collection should occur prior to Sulfasalazine administration due to the potential for falsely depressed results  ALT 25  12 - 78 U/L Final    Comment:   Specimen collection should occur prior to Sulfasalazine administration due to the potential for falsely depressed results  Alkaline Phosphatase 69  46 - 116 U/L Final    Total Protein 7 2  6 4 - 8 2 g/dL Final    Albumin 3 9  3 5 - 5 0 g/dL Final    Total Bilirubin 0 60  0 20 - 1 00 mg/dL Final    eGFR 110  ml/min/1 73sq m Final    Narrative:     National Kidney Disease Education Program recommendations are as follows:  GFR calculation is accurate only with a steady state creatinine  Chronic Kidney disease less than 60 ml/min/1 73 sq  meters  Kidney failure less than 15 ml/min/1 73 sq  meters  X-ray chest 2 views   Final Result      No acute consolidation or congestion seen           Workstation performed: [id number]             Procedures  ECG 12 Lead Documentation  Date/Time: 10/15/2017 2:29 AM  Performed by: Carmen Johnson by: Juve Torrez     Indications / Diagnosis:  Chest pain  ECG reviewed by me, the ED Provider: yes    Patient location:  ED  Previous ECG:     Previous ECG:  Compared to current    Comparison ECG info:  Earlier today, delta EKG    Similarity:  No change    Comparison to cardiac monitor: Yes Interpretation:     Interpretation: normal    Rate:     ECG rate:  Seventy-six    ECG rate assessment: normal    Rhythm:     Rhythm: sinus rhythm    Ectopy:     Ectopy: none    QRS:     QRS axis:  Normal    QRS intervals:  Normal  Conduction:     Conduction: normal    ST segments:     ST segments:  Normal  T waves:     T waves: normal    Comments: Independently reviewed by me  No change from prior EKG  ECG 12 Lead Documentation  Date/Time: 10/14/2017 10:43 PM  Performed by: Rahat Chavez by: Temi Pillai     Indications / Diagnosis:  CP  ECG reviewed by me, the ED Provider: yes    Patient location:  ED  Previous ECG:     Previous ECG:  Unavailable    Comparison to cardiac monitor: Yes    Interpretation:     Interpretation: normal    Rate:     ECG rate:  70    ECG rate assessment: normal    Rhythm:     Rhythm: sinus rhythm    Ectopy:     Ectopy: none    QRS:     QRS axis:  Normal    QRS intervals:  Normal  Conduction:     Conduction: normal    ST segments:     ST segments:  Normal  T waves:     T waves: normal    Comments:      Questionable Incomplete RBBB          Phone Contacts  ED Phone Contact    ED Course  ED Course as of Oct 18 1057   Sun Oct 15, 2017   0103 Troponin I: <0 02   0124 Patient feels much improved after the Migraine Cocktail  Patient feels well and wants to go home  She was convincable to stay for a repeat trop/ekg  This is a low risk cardiac patient  MDM  Number of Diagnoses or Management Options  Chest pain, non-cardiac:   Migraine:   Diagnosis management comments: Patient's chest pain, will do a cardiac evaluation  Patient complaining of migraine symptoms, will give a migraine cocktail  Considering the headache was not acute on onset and she has had multiple headaches in the past we will not do a CT scan of her head  This feels similar to other headaches but stronger    This patient is anxious, having fibromyalgia flare   Will attempt to control symptoms over symptomatic control seems unlikely with this plan  This patient will not be treated with narcotics unless there are positive findings on workup  Patient is feeling improved after the migraine cocktail and feels as though she wants to go home  She agrees to stay for a repeat troponin, EKG  Repeat troponin, EKG are negative  Patient will be discharged home with a diagnosis of migraine and noncardiac chest pain  Given good return precautions in case of return of chest pain, shortness of breath, other cardiac indicators  Advised close follow-up with PCP  Patient had opportunity to ask questions and has no further questions about the workup performed here order discharge instructions  Amount and/or Complexity of Data Reviewed  Clinical lab tests: reviewed and ordered  Tests in the radiology section of CPT®: reviewed and ordered      CritCare Time    Disposition  Final diagnoses:   Migraine   Chest pain, non-cardiac     ED Disposition     ED Disposition Condition Comment    Discharge  Jewell Santiago discharge to home/self care  Condition at discharge: Good        Follow-up Information     Follow up With Specialties Details Why 71 Jennifer Chávez MD Internal Medicine Schedule an appointment as soon as possible for a visit For follow-up [facility] Emergency Department Emergency Medicine  If symptoms worsen:  Return of chest pain, shortness of breath, fever, chills, any other concerning symptoms   [address] [id number] ED, [address] [place] [address], [id number]        Discharge Medication List as of 10/15/2017  2:47 AM      CONTINUE these medications which have NOT CHANGED    Details   Cholecalciferol (VITAMIN D PO) Take by mouth, Historical Med      EPINEPHrine (EPIPEN) 0 3 mg/0 3 mL SOAJ Inject 0 3 mL into the shoulder, thigh, or buttocks once for 1 dose If needed for allergic reaction  Proceed immediately to ED if you use the EpiPen , Starting Mon 6/74/5359, Print      FOLIC ACID PO Take by mouth, Historical Med      gabapentin (NEURONTIN) 100 mg capsule Take 100 mg by mouth 3 (three) times a day, Historical Med      METHOTREXATE PO Take by mouth, Historical Med           No discharge procedures on file      ED Provider  Electronically Signed by       Alejandrina Deshpande DO  10/18/17 8116

## 2018-06-02 ENCOUNTER — HOSPITAL ENCOUNTER (EMERGENCY)
Facility: HOSPITAL | Age: 33
Discharge: HOME | End: 2018-06-03
Attending: EMERGENCY MEDICINE
Payer: COMMERCIAL

## 2018-06-02 ENCOUNTER — APPOINTMENT (EMERGENCY)
Dept: CT IMAGING | Facility: HOSPITAL | Age: 33
End: 2018-06-02
Payer: COMMERCIAL

## 2018-06-02 DIAGNOSIS — G43.909 MIGRAINE HEADACHE: Primary | ICD-10-CM

## 2018-06-02 LAB
ANION GAP SERPL CALCULATED.3IONS-SCNC: 6 MMOL/L (ref 4–13)
BASOPHILS # BLD AUTO: 0.03 THOUSANDS/ΜL (ref 0–0.1)
BASOPHILS NFR BLD AUTO: 0 % (ref 0–1)
BUN SERPL-MCNC: 10 MG/DL (ref 5–25)
CALCIUM SERPL-MCNC: 8.9 MG/DL (ref 8.3–10.1)
CHLORIDE SERPL-SCNC: 104 MMOL/L (ref 100–108)
CO2 SERPL-SCNC: 26 MMOL/L (ref 21–32)
CREAT SERPL-MCNC: 0.79 MG/DL (ref 0.6–1.3)
EOSINOPHIL # BLD AUTO: 0.2 THOUSAND/ΜL (ref 0–0.61)
EOSINOPHIL NFR BLD AUTO: 3 % (ref 0–6)
ERYTHROCYTE [DISTWIDTH] IN BLOOD BY AUTOMATED COUNT: 15.3 % (ref 11.6–15.1)
GFR SERPL CREATININE-BSD FRML MDRD: 98 ML/MIN/1.73SQ M
GLUCOSE SERPL-MCNC: 108 MG/DL (ref 65–140)
HCG SERPL QL: NEGATIVE
HCT VFR BLD AUTO: 37.5 % (ref 34.8–46.1)
HGB BLD-MCNC: 12.7 G/DL (ref 11.5–15.4)
LYMPHOCYTES # BLD AUTO: 2.53 THOUSANDS/ΜL (ref 0.6–4.47)
LYMPHOCYTES NFR BLD AUTO: 34 % (ref 14–44)
MCH RBC QN AUTO: 27 PG (ref 26.8–34.3)
MCHC RBC AUTO-ENTMCNC: 33.9 G/DL (ref 31.4–37.4)
MCV RBC AUTO: 80 FL (ref 82–98)
MONOCYTES # BLD AUTO: 1.01 THOUSAND/ΜL (ref 0.17–1.22)
MONOCYTES NFR BLD AUTO: 13 % (ref 4–12)
NEUTROPHILS # BLD AUTO: 3.79 THOUSANDS/ΜL (ref 1.85–7.62)
NEUTS SEG NFR BLD AUTO: 50 % (ref 43–75)
PLATELET # BLD AUTO: 248 THOUSANDS/UL (ref 149–390)
PMV BLD AUTO: 11.1 FL (ref 8.9–12.7)
POTASSIUM SERPL-SCNC: 3.2 MMOL/L (ref 3.5–5.3)
RBC # BLD AUTO: 4.71 MILLION/UL (ref 3.81–5.12)
SODIUM SERPL-SCNC: 136 MMOL/L (ref 136–145)
WBC # BLD AUTO: 7.56 THOUSAND/UL (ref 4.31–10.16)

## 2018-06-02 PROCEDURE — 70450 CT HEAD/BRAIN W/O DYE: CPT

## 2018-06-02 PROCEDURE — 85025 COMPLETE CBC W/AUTO DIFF WBC: CPT | Performed by: EMERGENCY MEDICINE

## 2018-06-02 PROCEDURE — 80048 BASIC METABOLIC PNL TOTAL CA: CPT | Performed by: EMERGENCY MEDICINE

## 2018-06-02 PROCEDURE — 96375 TX/PRO/DX INJ NEW DRUG ADDON: CPT

## 2018-06-02 PROCEDURE — 96361 HYDRATE IV INFUSION ADD-ON: CPT

## 2018-06-02 PROCEDURE — 96374 THER/PROPH/DIAG INJ IV PUSH: CPT

## 2018-06-02 PROCEDURE — 84703 CHORIONIC GONADOTROPIN ASSAY: CPT | Performed by: EMERGENCY MEDICINE

## 2018-06-02 PROCEDURE — 36415 COLL VENOUS BLD VENIPUNCTURE: CPT | Performed by: EMERGENCY MEDICINE

## 2018-06-02 RX ORDER — SUMATRIPTAN 50 MG/1
50 TABLET, FILM COATED ORAL ONCE AS NEEDED
COMMUNITY
End: 2019-11-16

## 2018-06-02 RX ORDER — METOCLOPRAMIDE HYDROCHLORIDE 5 MG/ML
10 INJECTION INTRAMUSCULAR; INTRAVENOUS ONCE
Status: COMPLETED | OUTPATIENT
Start: 2018-06-02 | End: 2018-06-02

## 2018-06-02 RX ORDER — DIPHENHYDRAMINE HYDROCHLORIDE 50 MG/ML
50 INJECTION INTRAMUSCULAR; INTRAVENOUS ONCE
Status: COMPLETED | OUTPATIENT
Start: 2018-06-02 | End: 2018-06-02

## 2018-06-02 RX ORDER — TOPIRAMATE 50 MG/1
25 TABLET, FILM COATED ORAL 3 TIMES DAILY
COMMUNITY
End: 2019-11-16

## 2018-06-02 RX ORDER — DEXAMETHASONE SODIUM PHOSPHATE 10 MG/ML
10 INJECTION, SOLUTION INTRAMUSCULAR; INTRAVENOUS ONCE
Status: COMPLETED | OUTPATIENT
Start: 2018-06-02 | End: 2018-06-02

## 2018-06-02 RX ADMIN — DIPHENHYDRAMINE HYDROCHLORIDE 50 MG: 50 INJECTION, SOLUTION INTRAMUSCULAR; INTRAVENOUS at 23:10

## 2018-06-02 RX ADMIN — METOCLOPRAMIDE 10 MG: 5 INJECTION, SOLUTION INTRAMUSCULAR; INTRAVENOUS at 23:10

## 2018-06-02 RX ADMIN — DEXAMETHASONE SODIUM PHOSPHATE 10 MG: 10 INJECTION, SOLUTION INTRAMUSCULAR; INTRAVENOUS at 23:10

## 2018-06-02 RX ADMIN — SODIUM CHLORIDE 1000 ML: 0.9 INJECTION, SOLUTION INTRAVENOUS at 23:10

## 2018-06-03 VITALS
HEART RATE: 76 BPM | BODY MASS INDEX: 22.26 KG/M2 | DIASTOLIC BLOOD PRESSURE: 62 MMHG | WEIGHT: 125.66 LBS | RESPIRATION RATE: 18 BRPM | TEMPERATURE: 98.2 F | SYSTOLIC BLOOD PRESSURE: 110 MMHG | OXYGEN SATURATION: 98 %

## 2018-06-03 PROCEDURE — 99284 EMERGENCY DEPT VISIT MOD MDM: CPT

## 2018-06-03 PROCEDURE — 96361 HYDRATE IV INFUSION ADD-ON: CPT

## 2018-06-03 RX ORDER — METOCLOPRAMIDE 10 MG/1
10 TABLET ORAL EVERY 8 HOURS PRN
Qty: 30 TABLET | Refills: 0 | Status: SHIPPED | OUTPATIENT
Start: 2018-06-03 | End: 2018-10-13

## 2018-06-03 NOTE — DISCHARGE INSTRUCTIONS
Reglan every 8 hours if needed for headache  Rest  Follow up with your doctor  Return worsening symptoms or problems     Migraine Headache   WHAT YOU NEED TO KNOW:   A migraine is a severe headache  The pain can be so severe that it interferes with your daily activities  A migraine can last a few hours up to several days  The exact cause of migraines is not known  DISCHARGE INSTRUCTIONS:   Return to the emergency department if:   · You have a headache that seems different or much worse than your usual migraine headache  · You have a severe headache with a fever or a stiff neck  · You have new problems with speech, vision, balance, or movement  · You feel like you are going to faint, you become confused, or you have a seizure  Contact your healthcare provider or neurologist if:   · Your migraines interfere with your daily activities  · Your medicines or treatments stop working  · You have questions or concerns about your condition or care  Medicines: You may need any of the following  Take medicine as soon as you feel a migraine begin  · Prescription pain medicine  may be given  Do not wait until the pain is severe before you take your medicine  · Migraine medicines  are used to help prevent a migraine or stop it once it starts  · Antinausea medicine  may be given to calm your stomach and to help prevent vomiting  This medicine can also help relieve pain  · Take your medicine as directed  Contact your healthcare provider if you think your medicine is not helping or if you have side effects  Tell him or her if you are allergic to any medicine  Keep a list of the medicines, vitamins, and herbs you take  Include the amounts, and when and why you take them  Bring the list or the pill bottles to follow-up visits  Carry your medicine list with you in case of an emergency  Manage your symptoms:   · Rest in a dark, quiet room  This will help decrease your pain   Sleep may also help relieve the pain  · Apply ice to decrease pain  Use an ice pack, or put crushed ice in a plastic bag  Cover the ice pack with a towel and place it on your head  Apply ice for 15 to 20 minutes every hour  · Apply heat to decrease pain and muscle spasms  Use a small towel dampened with warm water or a heating pad, or sit in a warm bath  Apply heat on the area for 20 to 30 minutes every 2 hours  You may alternate heat and ice  · Keep a migraine record  Write down when your migraines start and stop  Include your symptoms and what you were doing when a migraine began  Record what you ate or drank for 24 hours before the migraine started  Keep track of what you did to treat your migraine and if it worked  Bring the migraine record with you to visits with your healthcare provider  Follow up with your healthcare provider or neurologist as directed:  Bring your migraine record with you  Write down your questions so you remember to ask them during your visits  Prevent another migraine:   · Do not smoke  Nicotine and other chemicals in cigarettes and cigars can trigger a migraine or make it worse  Ask your healthcare provider for information if you currently smoke and need help to quit  E-cigarettes or smokeless tobacco still contain nicotine  Talk to your healthcare provider before you use these products  · Do not drink alcohol  Alcohol can trigger a migraine  It can also keep medicines used to treat your migraines from working  · Get regular exercise  Exercise may help prevent migraines  Talk to your healthcare provider about the best exercise plan for you  Try to get at least 30 minutes of exercise on most days  · Manage stress  Stress may trigger a migraine  Learn new ways to relax, such as deep breathing  · Create a sleep schedule  Go to bed and get up at the same times each day  Do not watch television before bed  · Eat regular meals    Include healthy foods such as include fruit, vegetables, whole-grain breads, low-fat dairy products, beans, lean meat, and fish  Do not have food or drinks that trigger your migraines  © 2017 [address] [employer] Information is for End User's use only and may not be sold, redistributed or otherwise used for commercial purposes  All illustrations and images included in CareNotes® are the copyrighted property of [employer]  or [employer]  The above information is an  only  It is not intended as medical advice for individual conditions or treatments  Talk to your doctor, nurse or pharmacist before following any medical regimen to see if it is safe and effective for you

## 2018-06-03 NOTE — ED PROVIDER NOTES
History  Chief Complaint   Patient presents with    Migraine     pt co of migrane for about 3 days, her usual medications are not helping, + nause      HPI patient is a 77-year-old female, history of migraines in the past, reports a severe migraine over the last 3 days  She reports a frontal headache  She reports no relief from her medications  Patient  presents with some sumatriptan and Topamax  She reports this is similar migraine she has had the past   Patient has never been imaged  She denies any fever or chills  There is no head injury  She denies any focal weakness  She reports that she has been very uncomfortable with the headache  She reports some vomiting with the headache  Past medical history of migraines asthma fibromyalgia rheumatoid arthritis  Family history noncontributory  Social history, here with family, nonsmoker    Prior to Admission Medications   Prescriptions Last Dose Informant Patient Reported? Taking? Cholecalciferol (VITAMIN D PO) 6/2/2018 at Unknown time  Yes Yes   Sig: Take by mouth   EPINEPHrine (EPIPEN) 0 3 mg/0 3 mL SOAJ   No No   Sig: Inject 0 3 mL into the shoulder, thigh, or buttocks once for 1 dose If needed for allergic reaction  Proceed immediately to ED if you use the EpiPen  SUMAtriptan (IMITREX) 50 mg tablet 6/2/2018 at Unknown time  Yes Yes   Sig: Take 50 mg by mouth once as needed for migraine   etanercept (ENBREL) 50 mg/mL SOSY Past Week at Unknown time  Yes Yes   Sig: Inject under the skin once a week   topiramate (TOPAMAX) 50 MG tablet 6/2/2018 at Unknown time  Yes Yes   Sig: Take 25 mg by mouth every 12 (twelve) hours      Facility-Administered Medications: None       Past Medical History:   Diagnosis Date    Arthritis     Asthma     Fibromyalgia     Rheumatoid arthritis ([facility] [address] )        Past Surgical History:   Procedure Laterality Date    TUBAL LIGATION      TUBAL LIGATION         History reviewed  No pertinent family history    I have reviewed and agree with the history as documented  Social History   Substance Use Topics    Smoking status: Never Smoker    Smokeless tobacco: Never Used    Alcohol use No        Review of Systems   Constitutional: Negative for diaphoresis, fatigue and fever  HENT: Negative for congestion, ear pain, nosebleeds and sore throat  Eyes: Negative for photophobia, pain, discharge and visual disturbance  Respiratory: Negative for cough, choking, chest tightness, shortness of breath and wheezing  Cardiovascular: Positive for chest pain  Negative for palpitations  Gastrointestinal: Positive for nausea and vomiting  Negative for abdominal distention, abdominal pain and diarrhea  Genitourinary: Negative for dysuria, flank pain and frequency  Musculoskeletal: Negative for back pain, gait problem and joint swelling  Skin: Negative for color change and rash  Neurological: Negative for dizziness, syncope and headaches  Psychiatric/Behavioral: Negative for behavioral problems and confusion  The patient is not nervous/anxious  All other systems reviewed and are negative  Physical Exam  Physical Exam   Constitutional: She is oriented to person, place, and time  She appears well-developed and well-nourished  HENT:   Head: Normocephalic  Right Ear: External ear normal    Left Ear: External ear normal    Nose: Nose normal    Mouth/Throat: Oropharynx is clear and moist    Eyes: EOM and lids are normal  Pupils are equal, round, and reactive to light  Neck: Normal range of motion  Neck supple  Cardiovascular: Normal rate, regular rhythm, normal heart sounds and intact distal pulses  Pulmonary/Chest: Effort normal and breath sounds normal  No respiratory distress  Abdominal: Soft  Bowel sounds are normal    Musculoskeletal: Normal range of motion  She exhibits no deformity  Neurological: She is alert and oriented to person, place, and time  She has normal strength   No cranial nerve deficit or sensory deficit  Coordination normal  GCS eye subscore is 4  GCS verbal subscore is 5  GCS motor subscore is 6  Skin: Skin is warm and dry  Psychiatric: She has a normal mood and affect  Nursing note and vitals reviewed     Pulse oximetry 98% on room air adequate oxygenation, there is no hypoxia    Vital Signs  ED Triage Vitals   Temperature Pulse Respirations Blood Pressure SpO2   06/02/18 2251 06/02/18 2249 06/02/18 2249 06/02/18 2249 06/02/18 2251   98 2 °F (36 8 °C) 64 18 139/75 98 %      Temp Source Heart Rate Source Patient Position - Orthostatic VS BP Location FiO2 (%)   06/02/18 2251 06/02/18 2249 06/02/18 2249 06/02/18 2249 --   Oral Monitor Lying Right arm       Pain Score       06/02/18 2249       Worst Possible Pain           Vitals:    06/02/18 2249   BP: 139/75   Pulse: 64   Patient Position - Orthostatic VS: Lying       Visual Acuity      ED Medications  Medications   sodium chloride 0 9 % bolus 1,000 mL (1,000 mL Intravenous New Bag 6/2/18 2310)   metoclopramide (REGLAN) injection 10 mg (10 mg Intravenous Given 6/2/18 2310)   diphenhydrAMINE (BENADRYL) injection 50 mg (50 mg Intravenous Given 6/2/18 2310)   dexamethasone (PF) (DECADRON) injection 10 mg (10 mg Intravenous Given 6/2/18 2310)       Diagnostic Studies  Results Reviewed     Procedure Component Value Units Date/Time    Basic metabolic panel [[id number]]  (Abnormal) Collected:  06/02/18 2310    Lab Status:  Final result Specimen:  Blood from Arm, Right Updated:  06/02/18 2340     Sodium 136 mmol/L      Potassium 3 2 (L) mmol/L      Chloride 104 mmol/L      CO2 26 mmol/L      Anion Gap 6 mmol/L      BUN 10 mg/dL      Creatinine 0 79 mg/dL      Glucose 108 mg/dL      Calcium 8 9 mg/dL      eGFR 98 ml/min/1 73sq m     Narrative:         National Kidney Disease Education Program recommendations are as follows:  GFR calculation is accurate only with a steady state creatinine  Chronic Kidney disease less than 60 ml/min/1 73 sq  meters  Kidney failure less than 15 ml/min/1 73 sq  meters  hCG, qualitative pregnancy [[id number]]  (Normal) Collected:  06/02/18 2310    Lab Status:  Final result Specimen:  Blood from Arm, Right Updated:  06/02/18 2340     Preg, Serum Negative    CBC and differential [[id number]]  (Abnormal) Collected:  06/02/18 2310    Lab Status:  Final result Specimen:  Blood from Arm, Right Updated:  06/02/18 2319     WBC 7 56 Thousand/uL      RBC 4 71 Million/uL      Hemoglobin 12 7 g/dL      Hematocrit 37 5 %      MCV 80 (L) fL      MCH 27 0 pg      MCHC 33 9 g/dL      RDW 15 3 (H) %      MPV 11 1 fL      Platelets 981 Thousands/uL      Neutrophils Relative 50 %      Lymphocytes Relative 34 %      Monocytes Relative 13 (H) %      Eosinophils Relative 3 %      Basophils Relative 0 %      Neutrophils Absolute 3 79 Thousands/µL      Lymphocytes Absolute 2 53 Thousands/µL      Monocytes Absolute 1 01 Thousand/µL      Eosinophils Absolute 0 20 Thousand/µL      Basophils Absolute 0 03 Thousands/µL                  CT head without contrast   Final Result by Lauro Ochoa MD (06/03 0028)      No acute intracranial abnormality  Workstation performed: [id number]                    Procedures  Procedures       Phone Contacts  ED Phone Contact    ED Course      CT scan of brain showed no acute abnormality, CT was required the patient was never imaged before, headache was more severe than normal   Patient's electrolytes within normal limits  Patient's white count was normal at 7 5 no sign of inflammation, hemoglobin normal at 12 7 no sign of anemia  Pregnancy test was negative  re-evaluated the patient, markedly improved  Discussed outpatient treatment with Reglan  [facility] medical decision making 56-year-old female presents with headache similar to her normal migraines but more severe and lasting over 3 days  Patient never been imaged before CT scan was negative  Patient improved with Reglan    We discussed outpatient treatment and follow-up  CritCare Time    Disposition  Final diagnoses:   Migraine headache     Time reflects when diagnosis was documented in both MDM as applicable and the Disposition within this note     Time User Action Codes Description Comment    6/3/2018 12:37 AM Feleciadede De OliveiraJossie Genevieve [G43 909] Migraine headache       ED Disposition     ED Disposition Condition Comment    Discharge  Adrian Severe discharge to home/self care  Condition at discharge: Good        Follow-up Information     Follow up With Specialties Details Why Cornell Esparza MD Internal Medicine   [address] [place] [address]  [phone]            Patient's Medications   Discharge Prescriptions    METOCLOPRAMIDE (REGLAN) 10 MG TABLET    Take 1 tablet (10 mg total) by mouth every 8 (eight) hours as needed (Headache)       Start Date: 6/3/2018  End Date: --       Order Dose: 10 mg       Quantity: 30 tablet    Refills: 0     No discharge procedures on file      ED Provider  Electronically Signed by           Bandar Tenorio MD  06/03/18 7106

## 2018-08-12 ENCOUNTER — HOSPITAL ENCOUNTER (EMERGENCY)
Facility: HOSPITAL | Age: 33
Discharge: HOME | End: 2018-08-13
Attending: EMERGENCY MEDICINE
Payer: COMMERCIAL

## 2018-08-12 DIAGNOSIS — R51.9 HEADACHE: Primary | ICD-10-CM

## 2018-08-13 ENCOUNTER — APPOINTMENT (EMERGENCY)
Dept: CT IMAGING | Facility: HOSPITAL | Age: 33
End: 2018-08-13
Attending: EMERGENCY MEDICINE
Payer: COMMERCIAL

## 2018-08-13 VITALS
RESPIRATION RATE: 18 BRPM | TEMPERATURE: 98 F | HEART RATE: 70 BPM | OXYGEN SATURATION: 98 % | SYSTOLIC BLOOD PRESSURE: 110 MMHG | DIASTOLIC BLOOD PRESSURE: 80 MMHG

## 2018-08-13 LAB — EXT PREG TEST URINE: NEGATIVE

## 2018-08-13 PROCEDURE — 81025 URINE PREGNANCY TEST: CPT | Performed by: EMERGENCY MEDICINE

## 2018-08-13 PROCEDURE — 70450 CT HEAD/BRAIN W/O DYE: CPT

## 2018-08-13 PROCEDURE — 96375 TX/PRO/DX INJ NEW DRUG ADDON: CPT

## 2018-08-13 PROCEDURE — 96374 THER/PROPH/DIAG INJ IV PUSH: CPT

## 2018-08-13 PROCEDURE — 96361 HYDRATE IV INFUSION ADD-ON: CPT

## 2018-08-13 PROCEDURE — 99284 EMERGENCY DEPT VISIT MOD MDM: CPT

## 2018-08-13 RX ORDER — METOCLOPRAMIDE HYDROCHLORIDE 5 MG/ML
10 INJECTION INTRAMUSCULAR; INTRAVENOUS ONCE
Status: COMPLETED | OUTPATIENT
Start: 2018-08-13 | End: 2018-08-13

## 2018-08-13 RX ORDER — DIPHENHYDRAMINE HYDROCHLORIDE 50 MG/ML
25 INJECTION INTRAMUSCULAR; INTRAVENOUS ONCE
Status: COMPLETED | OUTPATIENT
Start: 2018-08-13 | End: 2018-08-13

## 2018-08-13 RX ADMIN — METOCLOPRAMIDE 10 MG: 5 INJECTION, SOLUTION INTRAMUSCULAR; INTRAVENOUS at 00:53

## 2018-08-13 RX ADMIN — DIPHENHYDRAMINE HYDROCHLORIDE 25 MG: 50 INJECTION, SOLUTION INTRAMUSCULAR; INTRAVENOUS at 00:53

## 2018-08-13 RX ADMIN — SODIUM CHLORIDE 1000 ML: 0.9 INJECTION, SOLUTION INTRAVENOUS at 00:53

## 2018-08-13 RX ADMIN — SODIUM CHLORIDE 1000 ML: 0.9 INJECTION, SOLUTION INTRAVENOUS at 00:52

## 2018-08-13 NOTE — DISCHARGE INSTRUCTIONS
Acute Headache   WHAT YOU NEED TO KNOW:   An acute headache is pain or discomfort that starts suddenly and gets worse quickly  You may have an acute headache only when you feel stress or eat certain foods  Other acute headache pain can happen every day, and sometimes several times a day  DISCHARGE INSTRUCTIONS:   Return to the emergency department if:   · You have severe pain  · You have numbness or weakness on one side of your face or body  · You have a headache that occurs after a blow to the head, a fall, or other trauma  · You have a headache, are forgetful or confused, or have trouble speaking  · You have a headache, stiff neck, and a fever  Contact your healthcare provider if:   · You have a constant headache and are vomiting  · You have a headache each day that does not get better, even after treatment  · You have changes in your headaches, or new symptoms that occur when you have a headache  · You have questions or concerns about your condition or care  Medicines: You may need any of the following:  · Prescription pain medicine  may be given  The medicine your healthcare provider recommends will depend on the kind of headaches you have  You will need to take prescription headache medicines as directed to prevent a problem called rebound headache  These headaches happen with regular use of pain relievers for headache disorders  · NSAIDs , such as ibuprofen, help decrease swelling, pain, and fever  This medicine is available with or without a doctor's order  NSAIDs can cause stomach bleeding or kidney problems in certain people  If you take blood thinner medicine, always ask your healthcare provider if NSAIDs are safe for you  Always read the medicine label and follow directions  · Acetaminophen  decreases pain and fever  It is available without a doctor's order  Ask how much to take and how often to take it  Follow directions   Read the labels of all other medicines you are using to see if they also contain acetaminophen, or ask your doctor or pharmacist  Acetaminophen can cause liver damage if not taken correctly  Do not use more than 3 grams (3,000 milligrams) total of acetaminophen in one day  · Antidepressants  may be given for some kinds of headaches  · Take your medicine as directed  Contact your healthcare provider if you think your medicine is not helping or if you have side effects  Tell him or her if you are allergic to any medicine  Keep a list of the medicines, vitamins, and herbs you take  Include the amounts, and when and why you take them  Bring the list or the pill bottles to follow-up visits  Carry your medicine list with you in case of an emergency  Manage your symptoms:   · Apply heat or ice  on the headache area  Use a heat or ice pack  For an ice pack, you can also put crushed ice in a plastic bag  Cover the pack or bag with a towel before you apply it to your skin  Ice and heat both help decrease pain, and heat also helps decrease muscle spasms  Apply heat for 20 to 30 minutes every 2 hours  Apply ice for 15 to 20 minutes every hour  Apply heat or ice for as long and for as many days as directed  You may alternate heat and ice  · Relax your muscles  Lie down in a comfortable position and close your eyes  Relax your muscles slowly  Start at your toes and work your way up your body  · Keep a record of your headaches  Write down when your headaches start and stop  Include your symptoms and what you were doing when the headache began  Record what you ate or drank for 24 hours before the headache started  Describe the pain and where it hurts  Keep track of what you did to treat your headache and if it worked  Prevent an acute headache:   · Avoid anything that triggers an acute headache  Examples include exposure to chemicals, going to high altitude, or not getting enough sleep  Create a regular sleep routine   Go to sleep at the same time and wake up at the same time each day  Do not use electronic devices before bedtime  These may trigger a headache or prevent you from sleeping well  · Do not smoke  Nicotine and other chemicals in cigarettes and cigars can trigger an acute headache or make it worse  Ask your healthcare provider for information if you currently smoke and need help to quit  E-cigarettes or smokeless tobacco still contain nicotine  Talk to your healthcare provider before you use these products  · Limit alcohol as directed  Alcohol can trigger an acute headache or make it worse  If you have cluster headaches, do not drink alcohol during an episode  For other types of headaches, ask your healthcare provider if it is safe for you to drink alcohol  Ask how much is safe for you to drink, and how often  · Exercise as directed  Exercise can reduce tension and help with headache pain  Aim for 30 minutes of physical activity on most days of the week  Your healthcare provider can help you create an exercise plan  · Eat a variety of healthy foods  Healthy foods include fruits, vegetables, low-fat dairy products, lean meats, fish, whole grains, and cooked beans  Your healthcare provider or dietitian can help you create meals plans if you need to avoid foods that trigger headaches  Follow up with your healthcare provider as directed:  Bring your headache record with you when you see your healthcare provider  Write down your questions so you remember to ask them during your visits  © 2017 [facility] Information is for End User's use only and may not be sold, redistributed or otherwise used for commercial purposes  All illustrations and images included in CareNotes® are the copyrighted property of A D A M , Inc  or [employer]  The above information is an  only  It is not intended as medical advice for individual conditions or treatments   Talk to your doctor, nurse or pharmacist before following any medical regimen to see if it is safe and effective for you

## 2018-08-13 NOTE — ED PROVIDER NOTES
History  Chief Complaint   Patient presents with    Headache     c/o "headache in back of head with nose bleed with clots   hx of migranes but this does not feel like my typical migrane"     26 y o  female presents with 2 hours of occipital headache without radiation  Patient describes moderate "heaviness" that came on suddenly while sitting down and continues in the ER  Patient denies maximal intensity of the headache within minutes of onset  Patient denies exertional component to the headache  Patient has had recurrent episodes for the past several months  Patient has been evaluated by Neurology referred for occipital nerve block  Patient states that this pain is similar to those prior episodes but more severe than previous ones  ROS: patient affirms epistaxis that resolved and pharyngitis; denies fever/chills, seizure, weight loss, confusion, focal weakness, diaphoresis, visual changes, photophobia, nausea/vomiting, neck pain/stiffness, facial pain, speech difficulty, weakness, gait disturbance, tingling/numbness, vertigo, lightheadedness, jaw claudication, syncope  Patient denies any concerns for CO exposure, recent tick bites, cervical manipulation, or trauma  Patient denies any use of illicit drugs  Patient affirms a history of similar headaches though she has a history of migraines  Patient denies any history of connective tissue disorders  Patient denies any history or family history of SAH  Patient takes Enbrel for RA  Objective:   PHYSICAL EXAM:   Constitutional : Non-toxic  Well developed, well-nourished with no acute distress   Eyes: PERRL, EOMI  Normal fundoscopic exam with no signs of papilledema or retinal hemorrhage  No nystagmus with gaze fixation  HENT: Atraumatic  Pharynx moist and non-erythematous  No tenderness or swelling over the temporal arteries  Neck: no carotid bruit, no tenderness to palpitation   CV: Regular rate and rhythm, no murmur   Peripheral pulses intact Respiratory: Lungs clear to auscultation bilaterally   Abdomen: Soft, non-tender, non-distended  Back: No vertebral tenderness   Skin: Normal color, warm and dry   Extremities: Non-tender, no pedal edema  Neuro: Alert and answers questions appropriately  Normal tandem gait, including toe walking and heel walking  Normal Romberg exam  No pronator drift  Normal finger to nose  Normal fine motor function with rapid finger movements  Normal hand tap  Cranial nerves II through XII grossly intact  Visual fields grossly intact  Upper and lower motor strength 5/5 and symmetric  Normal light touch sensory exam  Normal DTRs  Medical Decision Making   Patient presenting with headache with a broad differential  No abnormal symptoms for patient  No evidence of acute intracranial hemorrhage, venous sinus thrombosis, central nervous system infection, ocular disease  Patient does have recurrent similar headaches however patient states onset quicker and more severe than previous ones  Patient does not meet all criteria of the [place] Subarachnoid Hemorrhage rules  They are presenting within 6 hours of onset of the headache and a noncontrast CT of the head will be obtained to evaluate for [facility]  I have discussed the possibility though low risk (about 1 in 1,000) of bleeding with the patient who has declined lumbar puncture after discussion of the risks and benefits of the procedure  Will treat symptomatically and continue to reassess the patient  Prior to Admission Medications   Prescriptions Last Dose Informant Patient Reported? Taking? Cholecalciferol (VITAMIN D PO)   Yes No   Sig: Take by mouth   EPINEPHrine (EPIPEN) 0 3 mg/0 3 mL SOAJ   No No   Sig: Inject 0 3 mL into the shoulder, thigh, or buttocks once for 1 dose If needed for allergic reaction  Proceed immediately to ED if you use the EpiPen     SUMAtriptan (IMITREX) 50 mg tablet   Yes No   Sig: Take 50 mg by mouth once as needed for migraine etanercept (ENBREL) 50 mg/mL SOSY   Yes No   Sig: Inject under the skin once a week   metoclopramide (REGLAN) 10 mg tablet   No No   Sig: Take 1 tablet (10 mg total) by mouth every 8 (eight) hours as needed (Headache)   topiramate (TOPAMAX) 50 MG tablet   Yes No   Sig: Take 25 mg by mouth every 12 (twelve) hours      Facility-Administered Medications: None       Past Medical History:   Diagnosis Date    Arthritis     Asthma     Fibromyalgia     Rheumatoid arthritis ([address] )        Past Surgical History:   Procedure Laterality Date    TUBAL LIGATION      TUBAL LIGATION         History reviewed  No pertinent family history  I have reviewed and agree with the history as documented  Social History   Substance Use Topics    Smoking status: Never Smoker    Smokeless tobacco: Never Used    Alcohol use No        Review of Systems   All other systems reviewed and are negative        Physical Exam  Physical Exam    Vital Signs  ED Triage Vitals [08/12/18 2240]   Temperature Pulse Respirations Blood Pressure SpO2   98 °F (36 7 °C) 75 17 111/76 99 %      Temp Source Heart Rate Source Patient Position - Orthostatic VS BP Location FiO2 (%)   Oral Monitor Sitting Left arm --      Pain Score       8           Vitals:    08/13/18 0045 08/13/18 0130 08/13/18 0145 08/13/18 0207   BP: 113/77   110/80   Pulse:  72 72 70   Patient Position - Orthostatic VS:           Visual Acuity      ED Medications  Medications   diphenhydrAMINE (BENADRYL) injection 25 mg (25 mg Intravenous Given 8/13/18 0053)   metoclopramide (REGLAN) injection 10 mg (10 mg Intravenous Given 8/13/18 0053)   sodium chloride 0 9 % bolus 1,000 mL (0 mL Intravenous Stopped 8/13/18 0153)   sodium chloride 0 9 % bolus 1,000 mL (0 mL Intravenous Stopped 8/13/18 0152)       Diagnostic Studies  Results Reviewed     Procedure Component Value Units Date/Time    POCT pregnancy, urine [[id number]]  (Normal) Resulted:  08/13/18 0043    Lab Status:  Final result Specimen: Urine Updated:  08/13/18 0044     EXT PREG TEST UR (Ref: Negative) negative                 CT head without contrast   Final Result by Alexandra Fishman DO (08/13 8936)      No acute intracranial abnormality  Workstation performed: [id number]                    Procedures  Procedures       Phone Contacts  ED Phone Contact    ED Course  ED Course as of Aug 18 1741   Mon Aug 13, 2018   0140 Patient's headache resolved with treatment  Again discussed follow-up and return precautions in detail  Discussed patient's CT, explained lower risk for subarachnoid considering patient's timing of presentation to the emergency room  Discussed need for return if symptoms return additional testing including lumbar puncture  Discussed follow-up and return precautions in detail  MDM  CritCare Time    Disposition  Final diagnoses:   Headache     Time reflects when diagnosis was documented in both MDM as applicable and the Disposition within this note     Time User Action Codes Description Comment    8/13/2018  1:41 AM Karan Padilla Add [R51] Headache       ED Disposition     ED Disposition Condition Comment    Discharge  Butch Denney discharge to home/self care  Condition at discharge: Stable        Follow-up Information     Follow up With Specialties Details Why [address], MD Internal Medicine Schedule an appointment as soon as possible for a visit in 3 days Follow-up and reassessment   [address] [address]  [facility] Emergency Department Emergency Medicine Go to If symptoms worsen [address] [facility] [address]  1000 [facility] ED, [address] [place], [place], [address]          Discharge Medication List as of 8/13/2018  1:41 AM      CONTINUE these medications which have NOT CHANGED    Details   Cholecalciferol (VITAMIN D PO) Take by mouth, Historical Med      EPINEPHrine (EPIPEN) 0 3 mg/0 3 mL SOAJ Inject 0 3 mL into the shoulder, thigh, or buttocks once for 1 dose If needed for allergic reaction  Proceed immediately to ED if you use the EpiPen , Starting Mon 8/21/2017, Print      etanercept (ENBREL) 50 mg/mL SOSY Inject under the skin once a week, Historical Med      metoclopramide (REGLAN) 10 mg tablet Take 1 tablet (10 mg total) by mouth every 8 (eight) hours as needed (Headache), Starting Sun 6/3/2018, Print      SUMAtriptan (IMITREX) 50 mg tablet Take 50 mg by mouth once as needed for migraine, Historical Med      topiramate (TOPAMAX) 50 MG tablet Take 25 mg by mouth every 12 (twelve) hours, Historical Med           No discharge procedures on file      ED Provider  Electronically Signed by           Joya Wilson MD  08/18/18 3385

## 2018-10-13 ENCOUNTER — HOSPITAL ENCOUNTER (EMERGENCY)
Facility: HOSPITAL | Age: 33
Discharge: HOME | End: 2018-10-13
Attending: EMERGENCY MEDICINE
Payer: COMMERCIAL

## 2018-10-13 ENCOUNTER — APPOINTMENT (EMERGENCY)
Dept: CT IMAGING | Facility: HOSPITAL | Age: 33
End: 2018-10-13
Payer: COMMERCIAL

## 2018-10-13 VITALS
OXYGEN SATURATION: 100 % | DIASTOLIC BLOOD PRESSURE: 73 MMHG | RESPIRATION RATE: 18 BRPM | HEART RATE: 81 BPM | SYSTOLIC BLOOD PRESSURE: 116 MMHG | TEMPERATURE: 97.6 F

## 2018-10-13 DIAGNOSIS — R53.1 WEAKNESS: ICD-10-CM

## 2018-10-13 DIAGNOSIS — M79.7 FIBROMYALGIA: ICD-10-CM

## 2018-10-13 DIAGNOSIS — R10.31 RLQ ABDOMINAL PAIN: Primary | ICD-10-CM

## 2018-10-13 DIAGNOSIS — I01.1 RHEUMATOID AORTITIS: ICD-10-CM

## 2018-10-13 LAB
ALBUMIN SERPL BCP-MCNC: 3.9 G/DL (ref 3.5–5)
ALP SERPL-CCNC: 63 U/L (ref 46–116)
ALT SERPL W P-5'-P-CCNC: 18 U/L (ref 12–78)
ANION GAP SERPL CALCULATED.3IONS-SCNC: 7 MMOL/L (ref 4–13)
AST SERPL W P-5'-P-CCNC: 14 U/L (ref 5–45)
B-HCG SERPL-ACNC: <2 MIU/ML
BASOPHILS # BLD AUTO: 0.04 THOUSANDS/ΜL (ref 0–0.1)
BASOPHILS NFR BLD AUTO: 0 % (ref 0–1)
BILIRUB SERPL-MCNC: 0.8 MG/DL (ref 0.2–1)
BILIRUB UR QL STRIP: NEGATIVE
BUN SERPL-MCNC: 10 MG/DL (ref 5–25)
CALCIUM SERPL-MCNC: 8.6 MG/DL (ref 8.3–10.1)
CHLORIDE SERPL-SCNC: 103 MMOL/L (ref 100–108)
CLARITY UR: CLEAR
CO2 SERPL-SCNC: 27 MMOL/L (ref 21–32)
COLOR UR: YELLOW
CREAT SERPL-MCNC: 0.72 MG/DL (ref 0.6–1.3)
EOSINOPHIL # BLD AUTO: 0.08 THOUSAND/ΜL (ref 0–0.61)
EOSINOPHIL NFR BLD AUTO: 1 % (ref 0–6)
ERYTHROCYTE [DISTWIDTH] IN BLOOD BY AUTOMATED COUNT: 14.2 % (ref 11.6–15.1)
EXT PREG TEST URINE: NEGATIVE
GFR SERPL CREATININE-BSD FRML MDRD: 109 ML/MIN/1.73SQ M
GLUCOSE SERPL-MCNC: 89 MG/DL (ref 65–140)
GLUCOSE UR STRIP-MCNC: NEGATIVE MG/DL
HCT VFR BLD AUTO: 39.8 % (ref 34.8–46.1)
HGB BLD-MCNC: 13.3 G/DL (ref 11.5–15.4)
HGB UR QL STRIP.AUTO: NEGATIVE
IMM GRANULOCYTES # BLD AUTO: 0.03 THOUSAND/UL (ref 0–0.2)
IMM GRANULOCYTES NFR BLD AUTO: 0 % (ref 0–2)
KETONES UR STRIP-MCNC: NEGATIVE MG/DL
LEUKOCYTE ESTERASE UR QL STRIP: NEGATIVE
LIPASE SERPL-CCNC: 103 U/L (ref 73–393)
LYMPHOCYTES # BLD AUTO: 2.18 THOUSANDS/ΜL (ref 0.6–4.47)
LYMPHOCYTES NFR BLD AUTO: 24 % (ref 14–44)
MCH RBC QN AUTO: 27.7 PG (ref 26.8–34.3)
MCHC RBC AUTO-ENTMCNC: 33.4 G/DL (ref 31.4–37.4)
MCV RBC AUTO: 83 FL (ref 82–98)
MONOCYTES # BLD AUTO: 0.81 THOUSAND/ΜL (ref 0.17–1.22)
MONOCYTES NFR BLD AUTO: 9 % (ref 4–12)
NEUTROPHILS # BLD AUTO: 6.15 THOUSANDS/ΜL (ref 1.85–7.62)
NEUTS SEG NFR BLD AUTO: 66 % (ref 43–75)
NITRITE UR QL STRIP: NEGATIVE
NRBC BLD AUTO-RTO: 0 /100 WBCS
PH UR STRIP.AUTO: 6.5 [PH] (ref 4.5–8)
PLATELET # BLD AUTO: 249 THOUSANDS/UL (ref 149–390)
PMV BLD AUTO: 10.9 FL (ref 8.9–12.7)
POTASSIUM SERPL-SCNC: 3.7 MMOL/L (ref 3.5–5.3)
PROT SERPL-MCNC: 7.4 G/DL (ref 6.4–8.2)
PROT UR STRIP-MCNC: NEGATIVE MG/DL
RBC # BLD AUTO: 4.8 MILLION/UL (ref 3.81–5.12)
SODIUM SERPL-SCNC: 137 MMOL/L (ref 136–145)
SP GR UR STRIP.AUTO: 1.01 (ref 1–1.03)
UROBILINOGEN UR QL STRIP.AUTO: 0.2 E.U./DL
WBC # BLD AUTO: 9.29 THOUSAND/UL (ref 4.31–10.16)

## 2018-10-13 PROCEDURE — 96361 HYDRATE IV INFUSION ADD-ON: CPT

## 2018-10-13 PROCEDURE — 83690 ASSAY OF LIPASE: CPT | Performed by: EMERGENCY MEDICINE

## 2018-10-13 PROCEDURE — 81003 URINALYSIS AUTO W/O SCOPE: CPT | Performed by: EMERGENCY MEDICINE

## 2018-10-13 PROCEDURE — 74177 CT ABD & PELVIS W/CONTRAST: CPT

## 2018-10-13 PROCEDURE — 85025 COMPLETE CBC W/AUTO DIFF WBC: CPT | Performed by: EMERGENCY MEDICINE

## 2018-10-13 PROCEDURE — 81025 URINE PREGNANCY TEST: CPT | Performed by: EMERGENCY MEDICINE

## 2018-10-13 PROCEDURE — 36415 COLL VENOUS BLD VENIPUNCTURE: CPT | Performed by: EMERGENCY MEDICINE

## 2018-10-13 PROCEDURE — 84702 CHORIONIC GONADOTROPIN TEST: CPT | Performed by: EMERGENCY MEDICINE

## 2018-10-13 PROCEDURE — 80053 COMPREHEN METABOLIC PANEL: CPT | Performed by: EMERGENCY MEDICINE

## 2018-10-13 PROCEDURE — 99284 EMERGENCY DEPT VISIT MOD MDM: CPT

## 2018-10-13 PROCEDURE — 96374 THER/PROPH/DIAG INJ IV PUSH: CPT

## 2018-10-13 RX ORDER — MORPHINE SULFATE 4 MG/ML
4 INJECTION, SOLUTION INTRAMUSCULAR; INTRAVENOUS ONCE
Status: COMPLETED | OUTPATIENT
Start: 2018-10-13 | End: 2018-10-13

## 2018-10-13 RX ORDER — HYDROXYZINE HYDROCHLORIDE 25 MG/1
25 TABLET, FILM COATED ORAL 3 TIMES DAILY
COMMUNITY

## 2018-10-13 RX ADMIN — MORPHINE SULFATE 4 MG: 4 INJECTION INTRAVENOUS at 16:16

## 2018-10-13 RX ADMIN — SODIUM CHLORIDE 1000 ML: 0.9 INJECTION, SOLUTION INTRAVENOUS at 16:14

## 2018-10-13 RX ADMIN — IOHEXOL 100 ML: 350 INJECTION, SOLUTION INTRAVENOUS at 17:31

## 2018-10-13 NOTE — ED PROVIDER NOTES
History  Chief Complaint   Patient presents with    Dizziness     patient is c/o dizziness and headache since this am  also c/o left side pain and nausea     3 weeks intermittent episodic stabbing and "swelling internally" pain in L abdomen which radiates into L groin  No clear aggravating or alleviating factors  No preceding trauma  No urinary or GI sxs  Not pregnant  No h/o similar sxs  Pt concerned that the sxs related to enbrel use for ongoing RA  Denies h/o ureteral calculi but takes topamax regularly for migraines  Also reports HA and neck pain but no neck stiffness, no neuro deficit, and no fever or neuro sxs, pt feels these sxs are related to her ongoing migraines and that they are triggered and worsened by her L abd/flank pain  Currently symptomatic  Prior to Admission Medications   Prescriptions Last Dose Informant Patient Reported? Taking? EPINEPHrine (EPIPEN) 0 3 mg/0 3 mL SOAJ   No No   Sig: Inject 0 3 mL into the shoulder, thigh, or buttocks once for 1 dose If needed for allergic reaction  Proceed immediately to ED if you use the EpiPen  Magnesium Hydroxide (MAGNESIA PO)   Yes Yes   Sig: Take by mouth   SUMAtriptan (IMITREX) 50 mg tablet   Yes No   Sig: Take 50 mg by mouth once as needed for migraine   etanercept (ENBREL) 50 mg/mL SOSY   Yes No   Sig: Inject under the skin once a week   hydrOXYzine HCL (ATARAX) 25 mg tablet   Yes Yes   Sig: Take 25 mg by mouth 3 (three) times a day   topiramate (TOPAMAX) 50 MG tablet   Yes No   Sig: Take 25 mg by mouth 3 (three) times a day        Facility-Administered Medications: None       Past Medical History:   Diagnosis Date    Arthritis     Asthma     Fibromyalgia     Rheumatoid arthritis ([facility] [address] )        Past Surgical History:   Procedure Laterality Date    TUBAL LIGATION      TUBAL LIGATION         History reviewed  No pertinent family history  I have reviewed and agree with the history as documented      Social History   Substance Use Topics    Smoking status: Never Smoker    Smokeless tobacco: Never Used    Alcohol use No        Review of Systems   Constitutional: Positive for chills  Negative for fever  HENT: Negative for congestion and sore throat  Eyes: Negative  Respiratory: Negative  Cardiovascular: Negative  Gastrointestinal: Positive for abdominal pain and nausea  Negative for abdominal distention, diarrhea and vomiting  Endocrine: Negative  Genitourinary: Positive for flank pain  Negative for difficulty urinating and dysuria  Skin: Negative  Allergic/Immunologic: Negative  Neurological: Negative  Hematological: Negative  Physical Exam  Physical Exam   Constitutional: She is oriented to person, place, and time  She appears well-developed and well-nourished  No distress  HENT:   Head: Normocephalic and atraumatic  Eyes: Pupils are equal, round, and reactive to light  EOM are normal    Neck: Normal range of motion  Neck supple  No JVD present  Cardiovascular: Normal rate, regular rhythm, normal heart sounds and intact distal pulses  Pulmonary/Chest: Effort normal and breath sounds normal  No stridor  Abdominal: Soft  She exhibits no distension  There is tenderness  There is no rebound and no guarding  Musculoskeletal: Normal range of motion  She exhibits no edema, tenderness or deformity  Neurological: She is alert and oriented to person, place, and time  No cranial nerve deficit or sensory deficit  She exhibits normal muscle tone  Coordination normal    Skin: Skin is warm and dry  Capillary refill takes less than 2 seconds  She is not diaphoretic  Nursing note and vitals reviewed        Vital Signs  ED Triage Vitals [10/13/18 1433]   Temperature Pulse Respirations Blood Pressure SpO2   97 6 °F (36 4 °C) 92 18 117/68 100 %      Temp Source Heart Rate Source Patient Position - Orthostatic VS BP Location FiO2 (%)   Oral Monitor Sitting Right arm --      Pain Score       7 Vitals:    10/13/18 1630 10/13/18 1707 10/13/18 1715 10/13/18 1800   BP: 127/93 120/80 120/80 116/73   Pulse: 77 83 75 81   Patient Position - Orthostatic VS: Lying Lying Lying Lying       Visual Acuity      ED Medications  Medications   morphine (PF) 4 mg/mL injection 4 mg (4 mg Intravenous Given 10/13/18 1616)   sodium chloride 0 9 % bolus 1,000 mL (0 mL Intravenous Stopped 10/13/18 1749)   iohexol (OMNIPAQUE) 350 MG/ML injection (MULTI-DOSE) 100 mL (100 mL Intravenous Given 10/13/18 1731)       Diagnostic Studies  Results Reviewed     Procedure Component Value Units Date/Time    UA w Reflex to Microscopic [[id number]] Collected:  10/13/18 1657    Lab Status:  Final result Specimen:  Urine from Urine, Clean Catch Updated:  10/13/18 1709     Color, UA Yellow     Clarity, UA Clear     Specific Gravity, UA 1 010     pH, UA 6 5     Leukocytes, UA Negative     Nitrite, UA Negative     Protein, UA Negative mg/dl      Glucose, UA Negative mg/dl      Ketones, UA Negative mg/dl      Urobilinogen, UA 0 2 E U /dl      Bilirubin, UA Negative     Blood, UA Negative    POCT pregnancy, urine [[id number]]  (Normal) Resulted:  10/13/18 1705    Lab Status:  Final result Updated:  10/13/18 1705     EXT PREG TEST UR (Ref: Negative) negative    Quantitative hCG [[id number]]  (Normal) Collected:  10/13/18 1614    Lab Status:  Final result Specimen:  Blood from Arm, Right Updated:  10/13/18 1649     HCG, Quant <2 mIU/mL     Narrative:          Expected Ranges:     Approximate               Approximate HCG  Gestation age          Concentration ( mIU/mL)  _____________          ______________________   Christy Alexander                      HCG values  0 2-1                       5-50  1-2                           2-3                         100-5000  3-4                         500-15241  4-5                         1000-79205  5-6                         [id number]  6-8                         [id number]  8-12 [id number]    Lipase [[id number]]  (Normal) Collected:  10/13/18 1614    Lab Status:  Final result Specimen:  Blood from Arm, Right Updated:  10/13/18 1649     Lipase 103 u/L     Comprehensive metabolic panel [[id number]] Collected:  10/13/18 1614    Lab Status:  Final result Specimen:  Blood from Arm, Right Updated:  10/13/18 1643     Sodium 137 mmol/L      Potassium 3 7 mmol/L      Chloride 103 mmol/L      CO2 27 mmol/L      ANION GAP 7 mmol/L      BUN 10 mg/dL      Creatinine 0 72 mg/dL      Glucose 89 mg/dL      Calcium 8 6 mg/dL      AST 14 U/L      ALT 18 U/L      Alkaline Phosphatase 63 U/L      Total Protein 7 4 g/dL      Albumin 3 9 g/dL      Total Bilirubin 0 80 mg/dL      eGFR 109 ml/min/1 73sq m     Narrative:         National Kidney Disease Education Program recommendations are as follows:  GFR calculation is accurate only with a steady state creatinine  Chronic Kidney disease less than 60 ml/min/1 73 sq  meters  Kidney failure less than 15 ml/min/1 73 sq  meters  CBC and differential [[id number]] Collected:  10/13/18 1614    Lab Status:  Final result Specimen:  Blood from Arm, Right Updated:  10/13/18 1625     WBC 9 29 Thousand/uL      RBC 4 80 Million/uL      Hemoglobin 13 3 g/dL      Hematocrit 39 8 %      MCV 83 fL      MCH 27 7 pg      MCHC 33 4 g/dL      RDW 14 2 %      MPV 10 9 fL      Platelets 410 Thousands/uL      nRBC 0 /100 WBCs      Neutrophils Relative 66 %      Immat GRANS % 0 %      Lymphocytes Relative 24 %      Monocytes Relative 9 %      Eosinophils Relative 1 %      Basophils Relative 0 %      Neutrophils Absolute 6 15 Thousands/µL      Immature Grans Absolute 0 03 Thousand/uL      Lymphocytes Absolute 2 18 Thousands/µL      Monocytes Absolute 0 81 Thousand/µL      Eosinophils Absolute 0 08 Thousand/µL      Basophils Absolute 0 04 Thousands/µL                  CT abdomen pelvis with contrast   Final Result by Estevan Pedroza MD (10/13 1754)      No acute intra-abdominal abnormality  Normal appendix  Workstation performed: [id number]                    Procedures  Procedures       Phone Contacts  ED Phone Contact    ED Course  ED Course as of Oct 13 2124   Sat Oct 13, 2018   1808 6:08 PM reexamined, comfortable, conversant  Discussed lab urine and ct results w pt, she is comfortable w plan to d/c home at this time  [facility]  CritCare Time    Disposition  Final diagnoses:   RLQ abdominal pain   Weakness   Fibromyalgia   Rheumatoid aortitis     Time reflects when diagnosis was documented in both MDM as applicable and the Disposition within this note     Time User Action Codes Description Comment    10/13/2018  6:05 PM PonceBrooklyn Matters Add [R10 31] RLQ abdominal pain     10/13/2018  6:05 PM Ponce, Avel Add [R53 1] Weakness     10/13/2018  6:05 PM Zhanna Fat Add [M79 7] Fibromyalgia     10/13/2018  6:05 PM Zhanna Fat Add [I01 1] Rheumatoid aortitis       ED Disposition     ED Disposition Condition Comment    Discharge  Mitul Janes discharge to home/self care  Condition at discharge: Stable        Follow-up Information    None         Discharge Medication List as of 10/13/2018  6:06 PM      CONTINUE these medications which have NOT CHANGED    Details   hydrOXYzine HCL (ATARAX) 25 mg tablet Take 25 mg by mouth 3 (three) times a day, Historical Med      Magnesium Hydroxide (MAGNESIA PO) Take by mouth, Historical Med      EPINEPHrine (EPIPEN) 0 3 mg/0 3 mL SOAJ Inject 0 3 mL into the shoulder, thigh, or buttocks once for 1 dose If needed for allergic reaction    Proceed immediately to ED if you use the EpiPen , Starting Mon 8/21/2017, Print      etanercept (ENBREL) 50 mg/mL SOSY Inject under the skin once a week, Historical Med      SUMAtriptan (IMITREX) 50 mg tablet Take 50 mg by mouth once as needed for migraine, Historical Med      topiramate (TOPAMAX) 50 MG tablet Take 25 mg by mouth 3 (three) times a day  , Historical Med           No discharge procedures on file      ED Provider  Electronically Signed by           Iliana Kapadia MD  10/13/18 6346

## 2018-10-13 NOTE — ED NOTES
Discharge instructions reviewed with patient  Pt verbalized understanding and denied any questions at this time  Pt reports visitor as sober ride home       Juice Russell RN  10/13/18 8773

## 2018-10-13 NOTE — DISCHARGE INSTRUCTIONS
Dolor abdominal eli   LO QUE NECESITA SABER:   No se puede encontrar la causa del dolor abdominal  Si se encuentra maicol causa, el tratamiento dependerá de cuál es zaire causa  INSTRUCCIONES SOBRE EL TREVA HOSPITALARIA:   Regrese a la peter de emergencias si:   · Usted no puede dejar de vomitar o vomita jacqueline  · Usted tiene [employer] evacuaciones intestinales o estas tienen un aspecto alquitranado  · Usted tiene sangrado por mercer recto  · El tamaño del abdomen es más james de lo normal y se siente [place] y [place] doloroso  · Usted tiene dolor abdominal intenso  · Usted john de tener flatulencias y evacuaciones intestinales  · Usted se siente mareado, débil o tiene sensación de [place]  Pregúntele a mercer [place] vitaminas y minerales son adecuados para usted  · Usted tiene fiebre  · Tiene nuevos signos y síntomas  · Lexie síntomas no mejoran con el tratamiento  · Usted tiene preguntas o inquietudes acerca de mercer condición o cuidado  Medicamentos,  estos pueden administrarse para disminuir el dolor, tratar maicol infección y [place] lexie síntomas  [place] los [place] jemma se le haya indicado  Llame a mercer médico si usted piensa que el medicamento no está ayudando o si tiene efectos secundarios  Infórmele si es alérgico a cualquier medicamento  Mantenga maicol lista actualizada de los [place], las vitaminas y los productos herbales que sincere  Incluya los siguientes datos de los medicamentos: cantidad, frecuencia y motivo de administración  Traiga con usted la lista o los envases de la píldoras a lexie citas de seguimiento  Lleve la lista de los medicamentos con usted en lisa de maicol emergencia  El [place] de mercer síntomas:   · La aplicación de calor  sobre el abdomen de 20 a 30 minutos cada 2 horas por los [employer] indiquen  El calor ayuda a disminuir el dolor y los espasmos musculares  · Controle mercer estrés    El estrés puede causar dolor abdominal  Mercer médico puede recomendarle técnicas de relajación y ejercicios de respiración profunda para ayudar a disminuir el estrés  Barker médico puede recomendarle que hable con alguien sobre barker estrés o ansiedad, jemma un consejero o un amigo de [place]  [place] lo suficiente y realice ejercicio regularmente  · Limite o no consuma bebidas alcohólicas  El alcohol puede empeorar el dolor abdominal  Pregunte a barker médico si usted puede lu alcohol  Pregunte cuanto es la cantidad gayle para usted lu  · No fume  La nicotina y otros químicos en los cigarrillos pueden dañarle el esófago y [place]  Pida información a barker médico si usted actualmente fuma y necesita ayuda para dejar de fumar  Los cigarrillos electrónicos o tabaco sin humo todavía contienen nicotina  Consulte con barker médico antes de [employer]  Realice cambios en los alimentos que consume según se le indique:  No coma alimentos que causan dolor abdominal u otros síntomas  Ingiera comidas pequeñas, más a menudo  · Coma más alimentos ricos en fibra si tiene estreñimiento  Los alimentos altos en fibra incluyen frutas, verduras, alimentos de grano integral y legumbres  · No coma alimentos que causan gas si tiene distensión  Por ejemplo, brócoli, col y coliflor  No [place] gaseosas o bebidas carbonadas, ya que también pueden provocarle gases  · No consuma alimentos o bebidas que contienen sorbitol o fructosa si tiene diarrea y distensión  Jeraldene [place] son jugos de frutas, dulces, mermeladas y gomas de mascar sin azúcar  · No coma alimentos altos en grasas, jemma comidas fritas, hamburguesas con queso, salchichas y postres  · Limite o no tome cafeína  La cafeína [employer], jemma la acidez o las náuseas  · [place] suficientes líquidos para evitar la deshidratación causada por la diarrea o los vómitos  Pregunte a barker médico sobre la cantidad de líquido que necesita lu todos los días y cuáles le recomienda    Acuda a anuj consultas de control con barker médico según le indicaron  Anote anuj preguntas para que se acuerde de hacerlas darrick anuj visitas  © 2017 [address] [employer] Information is for End User's use only and may not be sold, redistributed or otherwise used for commercial purposes  All illustrations and images included in CareNotes® are the copyrighted property of A D A M , Inc  or Grant Holliday  Esta información es sólo para uso en educación  Barker intención no es darle un consejo médico sobre enfermedades o tratamientos  Colsulte con barker Cleve Riley farmacéutico antes de seguir cualquier régimen médico para saber si es seguro y efectivo para usted

## 2018-11-08 ENCOUNTER — HOSPITAL ENCOUNTER (EMERGENCY)
Facility: HOSPITAL | Age: 33
Discharge: HOME | End: 2018-11-08
Attending: EMERGENCY MEDICINE | Admitting: EMERGENCY MEDICINE
Payer: COMMERCIAL

## 2018-11-08 ENCOUNTER — APPOINTMENT (EMERGENCY)
Dept: CT IMAGING | Facility: HOSPITAL | Age: 33
End: 2018-11-08
Payer: COMMERCIAL

## 2018-11-08 ENCOUNTER — APPOINTMENT (EMERGENCY)
Dept: RADIOLOGY | Facility: HOSPITAL | Age: 33
End: 2018-11-08
Payer: COMMERCIAL

## 2018-11-08 VITALS
WEIGHT: 128.31 LBS | HEART RATE: 74 BPM | SYSTOLIC BLOOD PRESSURE: 111 MMHG | RESPIRATION RATE: 18 BRPM | BODY MASS INDEX: 22.73 KG/M2 | OXYGEN SATURATION: 100 % | DIASTOLIC BLOOD PRESSURE: 70 MMHG | HEIGHT: 63 IN | TEMPERATURE: 97.5 F

## 2018-11-08 DIAGNOSIS — R07.9 CHEST PAIN: Primary | ICD-10-CM

## 2018-11-08 LAB
ALBUMIN SERPL BCP-MCNC: 4.3 G/DL (ref 3.5–5)
ALP SERPL-CCNC: 86 U/L (ref 46–116)
ALT SERPL W P-5'-P-CCNC: 19 U/L (ref 12–78)
ANION GAP SERPL CALCULATED.3IONS-SCNC: 11 MMOL/L (ref 4–13)
AST SERPL W P-5'-P-CCNC: 17 U/L (ref 5–45)
ATRIAL RATE: 78 BPM
ATRIAL RATE: 97 BPM
BASOPHILS # BLD AUTO: 0.04 THOUSANDS/ΜL (ref 0–0.1)
BASOPHILS NFR BLD AUTO: 1 % (ref 0–1)
BILIRUB DIRECT SERPL-MCNC: 0.16 MG/DL (ref 0–0.2)
BILIRUB SERPL-MCNC: 0.7 MG/DL (ref 0.2–1)
BUN SERPL-MCNC: 15 MG/DL (ref 5–25)
CALCIUM SERPL-MCNC: 9.1 MG/DL (ref 8.3–10.1)
CHLORIDE SERPL-SCNC: 103 MMOL/L (ref 100–108)
CO2 SERPL-SCNC: 26 MMOL/L (ref 21–32)
CREAT SERPL-MCNC: 0.69 MG/DL (ref 0.6–1.3)
DEPRECATED D DIMER PPP: 1278 NG/ML (FEU) (ref 0–424)
EOSINOPHIL # BLD AUTO: 0.16 THOUSAND/ΜL (ref 0–0.61)
EOSINOPHIL NFR BLD AUTO: 2 % (ref 0–6)
ERYTHROCYTE [DISTWIDTH] IN BLOOD BY AUTOMATED COUNT: 14.1 % (ref 11.6–15.1)
EXT PREG TEST URINE: NEGATIVE
GFR SERPL CREATININE-BSD FRML MDRD: 113 ML/MIN/1.73SQ M
GLUCOSE SERPL-MCNC: 126 MG/DL (ref 65–140)
HCT VFR BLD AUTO: 41.3 % (ref 34.8–46.1)
HGB BLD-MCNC: 13.7 G/DL (ref 11.5–15.4)
IMM GRANULOCYTES # BLD AUTO: 0.02 THOUSAND/UL (ref 0–0.2)
IMM GRANULOCYTES NFR BLD AUTO: 0 % (ref 0–2)
LYMPHOCYTES # BLD AUTO: 1.81 THOUSANDS/ΜL (ref 0.6–4.47)
LYMPHOCYTES NFR BLD AUTO: 23 % (ref 14–44)
MCH RBC QN AUTO: 27.8 PG (ref 26.8–34.3)
MCHC RBC AUTO-ENTMCNC: 33.2 G/DL (ref 31.4–37.4)
MCV RBC AUTO: 84 FL (ref 82–98)
MONOCYTES # BLD AUTO: 0.66 THOUSAND/ΜL (ref 0.17–1.22)
MONOCYTES NFR BLD AUTO: 8 % (ref 4–12)
NEUTROPHILS # BLD AUTO: 5.34 THOUSANDS/ΜL (ref 1.85–7.62)
NEUTS SEG NFR BLD AUTO: 66 % (ref 43–75)
NRBC BLD AUTO-RTO: 0 /100 WBCS
P AXIS: 88 DEGREES
P AXIS: 90 DEGREES
PLATELET # BLD AUTO: 261 THOUSANDS/UL (ref 149–390)
PMV BLD AUTO: 10.8 FL (ref 8.9–12.7)
POTASSIUM SERPL-SCNC: 3.4 MMOL/L (ref 3.5–5.3)
PR INTERVAL: 134 MS
PR INTERVAL: 136 MS
PROT SERPL-MCNC: 8.2 G/DL (ref 6.4–8.2)
QRS AXIS: 82 DEGREES
QRS AXIS: 82 DEGREES
QRSD INTERVAL: 92 MS
QRSD INTERVAL: 98 MS
QT INTERVAL: 348 MS
QT INTERVAL: 366 MS
QTC INTERVAL: 417 MS
QTC INTERVAL: 441 MS
RBC # BLD AUTO: 4.93 MILLION/UL (ref 3.81–5.12)
SODIUM SERPL-SCNC: 140 MMOL/L (ref 136–145)
T WAVE AXIS: 72 DEGREES
T WAVE AXIS: 79 DEGREES
TROPONIN I SERPL-MCNC: <0.02 NG/ML
TROPONIN I SERPL-MCNC: <0.02 NG/ML
VENTRICULAR RATE: 78 BPM
VENTRICULAR RATE: 97 BPM
WBC # BLD AUTO: 8.03 THOUSAND/UL (ref 4.31–10.16)

## 2018-11-08 PROCEDURE — 81025 URINE PREGNANCY TEST: CPT | Performed by: PHYSICIAN ASSISTANT

## 2018-11-08 PROCEDURE — 80048 BASIC METABOLIC PNL TOTAL CA: CPT | Performed by: PHYSICIAN ASSISTANT

## 2018-11-08 PROCEDURE — 71275 CT ANGIOGRAPHY CHEST: CPT

## 2018-11-08 PROCEDURE — 36415 COLL VENOUS BLD VENIPUNCTURE: CPT | Performed by: PHYSICIAN ASSISTANT

## 2018-11-08 PROCEDURE — 96375 TX/PRO/DX INJ NEW DRUG ADDON: CPT

## 2018-11-08 PROCEDURE — 85025 COMPLETE CBC W/AUTO DIFF WBC: CPT | Performed by: PHYSICIAN ASSISTANT

## 2018-11-08 PROCEDURE — 80076 HEPATIC FUNCTION PANEL: CPT | Performed by: PHYSICIAN ASSISTANT

## 2018-11-08 PROCEDURE — 85379 FIBRIN DEGRADATION QUANT: CPT | Performed by: PHYSICIAN ASSISTANT

## 2018-11-08 PROCEDURE — 71046 X-RAY EXAM CHEST 2 VIEWS: CPT

## 2018-11-08 PROCEDURE — 96361 HYDRATE IV INFUSION ADD-ON: CPT

## 2018-11-08 PROCEDURE — 84484 ASSAY OF TROPONIN QUANT: CPT | Performed by: PHYSICIAN ASSISTANT

## 2018-11-08 PROCEDURE — 93010 ELECTROCARDIOGRAM REPORT: CPT | Performed by: INTERNAL MEDICINE

## 2018-11-08 PROCEDURE — 93005 ELECTROCARDIOGRAM TRACING: CPT

## 2018-11-08 PROCEDURE — 96374 THER/PROPH/DIAG INJ IV PUSH: CPT

## 2018-11-08 PROCEDURE — 99285 EMERGENCY DEPT VISIT HI MDM: CPT

## 2018-11-08 RX ORDER — DIPHENHYDRAMINE HYDROCHLORIDE 50 MG/ML
50 INJECTION INTRAMUSCULAR; INTRAVENOUS ONCE
Status: COMPLETED | OUTPATIENT
Start: 2018-11-08 | End: 2018-11-08

## 2018-11-08 RX ORDER — KETOROLAC TROMETHAMINE 30 MG/ML
15 INJECTION, SOLUTION INTRAMUSCULAR; INTRAVENOUS ONCE
Status: COMPLETED | OUTPATIENT
Start: 2018-11-08 | End: 2018-11-08

## 2018-11-08 RX ORDER — METOCLOPRAMIDE HYDROCHLORIDE 5 MG/ML
10 INJECTION INTRAMUSCULAR; INTRAVENOUS ONCE
Status: COMPLETED | OUTPATIENT
Start: 2018-11-08 | End: 2018-11-08

## 2018-11-08 RX ORDER — ALBUTEROL SULFATE 90 UG/1
2 AEROSOL, METERED RESPIRATORY (INHALATION) ONCE
Status: COMPLETED | OUTPATIENT
Start: 2018-11-08 | End: 2018-11-08

## 2018-11-08 RX ADMIN — IOHEXOL 85 ML: 350 INJECTION, SOLUTION INTRAVENOUS at 17:41

## 2018-11-08 RX ADMIN — DIPHENHYDRAMINE HYDROCHLORIDE 50 MG: 50 INJECTION, SOLUTION INTRAMUSCULAR; INTRAVENOUS at 17:14

## 2018-11-08 RX ADMIN — KETOROLAC TROMETHAMINE 15 MG: 30 INJECTION, SOLUTION INTRAMUSCULAR at 17:14

## 2018-11-08 RX ADMIN — METOCLOPRAMIDE 10 MG: 5 INJECTION, SOLUTION INTRAMUSCULAR; INTRAVENOUS at 17:14

## 2018-11-08 RX ADMIN — SODIUM CHLORIDE 1000 ML: 0.9 INJECTION, SOLUTION INTRAVENOUS at 16:45

## 2018-11-08 RX ADMIN — ALBUTEROL SULFATE 2 PUFF: 90 AEROSOL, METERED RESPIRATORY (INHALATION) at 18:43

## 2018-11-08 NOTE — ED PROVIDER NOTES
History  Chief Complaint   Patient presents with    Chest Pain     pt with chest pain and palpitations that started about 10 minutes before arrival, along with shortness of breath that has been going on for awhile      25 y/o female with chest pain, headache, sob  Symptoms began 1 5 hours ago  Chest pain resolved  Continues to feel short of breath, like she can't catch her breath  Not pleuritic  Nothing she does makes it better or worse  Has headache as well  She has been seen here for same symptoms about 1 year ago states current symptoms are more severe  No fever, chills, cough, URI symptoms  No recent travels, trauma, surgeries  No hemoptysis  No h/o PE or DVT  No exogenous estrogen  No leg pain or swelling  No sick contacts  No thunderclap headache  Similar to prior headaches           History provided by:  Patient   used: No    Chest Pain   Pain location:  Substernal area  Pain quality comment:  Unable to specify  Pain radiates to:  Does not radiate  Pain radiates to the back: no    Pain severity:  Moderate  Onset quality:  Sudden  Duration:  1 hour  Timing:  Constant  Progression:  Resolved  Chronicity:  New  Context: at rest    Context: not breathing, no drug use, not eating, no intercourse, not lifting, no movement, not raising an arm, no stress and no trauma    Relieved by:  Nothing  Worsened by:  Nothing tried  Ineffective treatments:  None tried  Associated symptoms: headache, palpitations and shortness of breath    Associated symptoms: no abdominal pain, no AICD problem, no altered mental status, no anorexia, no anxiety, no back pain, no claudication, no cough, no diaphoresis, no dizziness, no dysphagia, no fatigue, no fever, no heartburn, no lower extremity edema, no nausea, no near-syncope, no numbness, no orthopnea, no PND, no syncope, not vomiting and no weakness    Risk factors: no aortic disease, no birth control, no coronary artery disease, no diabetes mellitus, no Kita-Danlos syndrome, no high cholesterol, no hypertension, no immobilization, not male, no Marfan's syndrome, not obese, not pregnant, no prior DVT/PE, no smoking and no surgery        Prior to Admission Medications   Prescriptions Last Dose Informant Patient Reported? Taking? EPINEPHrine (EPIPEN) 0 3 mg/0 3 mL SOAJ   No No   Sig: Inject 0 3 mL into the shoulder, thigh, or buttocks once for 1 dose If needed for allergic reaction  Proceed immediately to ED if you use the EpiPen  Magnesium Hydroxide (MAGNESIA PO)   Yes No   Sig: Take by mouth   SUMAtriptan (IMITREX) 50 mg tablet   Yes No   Sig: Take 50 mg by mouth once as needed for migraine   etanercept (ENBREL) 50 mg/mL SOSY   Yes No   Sig: Inject under the skin once a week   hydrOXYzine HCL (ATARAX) 25 mg tablet   Yes No   Sig: Take 25 mg by mouth 3 (three) times a day   topiramate (TOPAMAX) 50 MG tablet   Yes No   Sig: Take 25 mg by mouth 3 (three) times a day        Facility-Administered Medications: None       Past Medical History:   Diagnosis Date    Arthritis     Asthma     Fibromyalgia     Rheumatoid arthritis ([facility] [address] )        Past Surgical History:   Procedure Laterality Date    TUBAL LIGATION      TUBAL LIGATION         History reviewed  No pertinent family history  I have reviewed and agree with the history as documented  Social History   Substance Use Topics    Smoking status: Never Smoker    Smokeless tobacco: Never Used    Alcohol use No        Review of Systems   Constitutional: Negative for activity change, appetite change, chills, diaphoresis, fatigue, fever and unexpected weight change  HENT: Negative for congestion, rhinorrhea, sinus pressure, sore throat and trouble swallowing  Eyes: Negative for photophobia and visual disturbance  Respiratory: Positive for shortness of breath  Negative for apnea, cough, choking, chest tightness, wheezing and stridor  Cardiovascular: Positive for chest pain and palpitations   Negative for orthopnea, claudication, leg swelling, syncope, PND and near-syncope  Gastrointestinal: Negative for abdominal distention, abdominal pain, anorexia, blood in stool, constipation, diarrhea, heartburn, nausea and vomiting  Genitourinary: Negative for decreased urine volume, difficulty urinating, dysuria, enuresis, flank pain, frequency, hematuria and urgency  Musculoskeletal: Negative for arthralgias, back pain, myalgias, neck pain and neck stiffness  Skin: Negative for color change, pallor, rash and wound  Allergic/Immunologic: Negative  Neurological: Positive for headaches  Negative for dizziness, tremors, syncope, weakness, light-headedness and numbness  Hematological: Negative  Psychiatric/Behavioral: The patient is nervous/anxious  All other systems reviewed and are negative  Physical Exam  Physical Exam   Constitutional: She is oriented to person, place, and time  She appears well-developed and well-nourished  Non-toxic appearance  She does not have a sickly appearance  She does not appear ill  No distress  HENT:   Head: Normocephalic and atraumatic  Eyes: Pupils are equal, round, and reactive to light  EOM and lids are normal    Neck: Normal range of motion  Neck supple  Cardiovascular: Normal rate, regular rhythm, S1 normal, S2 normal, normal heart sounds, intact distal pulses and normal pulses  Exam reveals no gallop, no distant heart sounds, no friction rub and no decreased pulses  No murmur heard  Pulses:       Radial pulses are 2+ on the right side, and 2+ on the left side  Pulmonary/Chest: Effort normal and breath sounds normal  No accessory muscle usage  No apnea, no tachypnea and no bradypnea  No respiratory distress  She has no decreased breath sounds  She has no wheezes  She has no rhonchi  She has no rales  Abdominal: Soft  Normal appearance  She exhibits no distension  There is no tenderness  There is no rigidity, no rebound and no guarding  Musculoskeletal: Normal range of motion  She exhibits no edema, tenderness or deformity  Neurological: She is alert and oriented to person, place, and time  No cranial nerve deficit  GCS eye subscore is 4  GCS verbal subscore is 5  GCS motor subscore is 6  Skin: Skin is warm, dry and intact  No rash noted  She is not diaphoretic  No erythema  No pallor  Psychiatric: Her speech is normal  Her mood appears anxious  Nursing note and vitals reviewed        Vital Signs  ED Triage Vitals   Temperature Pulse Respirations Blood Pressure SpO2   11/08/18 1628 11/08/18 1627 11/08/18 1627 11/08/18 1628 11/08/18 1627   97 5 °F (36 4 °C) 99 16 147/79 98 %      Temp Source Heart Rate Source Patient Position - Orthostatic VS BP Location FiO2 (%)   11/08/18 1627 11/08/18 1627 11/08/18 1627 11/08/18 1627 --   Oral Monitor Sitting Right arm       Pain Score       11/08/18 1834       No Pain           Vitals:    11/08/18 1627 11/08/18 1628 11/08/18 1834   BP:  147/79 114/69   Pulse: 99  79   Patient Position - Orthostatic VS: Sitting  Lying       Visual Acuity      ED Medications  Medications   sodium chloride 0 9 % bolus 1,000 mL (1,000 mL Intravenous New Bag 11/8/18 1645)   ketorolac (TORADOL) injection 15 mg (15 mg Intravenous Given 11/8/18 1714)   metoclopramide (REGLAN) injection 10 mg (10 mg Intravenous Given 11/8/18 1714)   diphenhydrAMINE (BENADRYL) injection 50 mg (50 mg Intravenous Given 11/8/18 1714)   iohexol (OMNIPAQUE) 350 MG/ML injection (MULTI-DOSE) 85 mL (85 mL Intravenous Given 11/8/18 1741)   albuterol (PROVENTIL HFA,VENTOLIN HFA) inhaler 2 puff (2 puffs Inhalation Given 11/8/18 1843)       Diagnostic Studies  Results Reviewed     Procedure Component Value Units Date/Time    Troponin I [[id number]]  (Normal) Collected:  11/08/18 1847    Lab Status:  Final result Specimen:  Blood from Arm, Left Updated:  11/08/18 1918     Troponin I <0 02 ng/mL     Troponin I [[id number]]  (Normal) Collected:  11/08/18 5079 Lab Status:  Final result Specimen:  Blood from Arm, Left Updated:  11/08/18 1721     Troponin I <0 02 ng/mL     Basic metabolic panel [[id number]]  (Abnormal) Collected:  11/08/18 1644    Lab Status:  Final result Specimen:  Blood from Arm, Left Updated:  11/08/18 1719     Sodium 140 mmol/L      Potassium 3 4 (L) mmol/L      Chloride 103 mmol/L      CO2 26 mmol/L      ANION GAP 11 mmol/L      BUN 15 mg/dL      Creatinine 0 69 mg/dL      Glucose 126 mg/dL      Calcium 9 1 mg/dL      eGFR 113 ml/min/1 73sq m     Narrative:         National Kidney Disease Education Program recommendations are as follows:  GFR calculation is accurate only with a steady state creatinine  Chronic Kidney disease less than 60 ml/min/1 73 sq  meters  Kidney failure less than 15 ml/min/1 73 sq  meters      Hepatic function panel [[id number]]  (Normal) Collected:  11/08/18 1644    Lab Status:  Final result Specimen:  Blood from Arm, Left Updated:  11/08/18 1719     Total Bilirubin 0 70 mg/dL      Bilirubin, Direct 0 16 mg/dL      Alkaline Phosphatase 86 U/L      AST 17 U/L      ALT 19 U/L      Total Protein 8 2 g/dL      Albumin 4 3 g/dL     D-Dimer [[id number]]  (Abnormal) Collected:  11/08/18 1644    Lab Status:  Final result Specimen:  Blood from Arm, Left Updated:  11/08/18 1716     D-Dimer, Quant 1,278 (H) ng/ml (FEU)     CBC and differential [[id number]] Collected:  11/08/18 1644    Lab Status:  Final result Specimen:  Blood from Arm, Left Updated:  11/08/18 1654     WBC 8 03 Thousand/uL      RBC 4 93 Million/uL      Hemoglobin 13 7 g/dL      Hematocrit 41 3 %      MCV 84 fL      MCH 27 8 pg      MCHC 33 2 g/dL      RDW 14 1 %      MPV 10 8 fL      Platelets 157 Thousands/uL      nRBC 0 /100 WBCs      Neutrophils Relative 66 %      Immat GRANS % 0 %      Lymphocytes Relative 23 %      Monocytes Relative 8 %      Eosinophils Relative 2 %      Basophils Relative 1 %      Neutrophils Absolute 5 34 Thousands/µL      Immature Grans Absolute 0 02 Thousand/uL      Lymphocytes Absolute 1 81 Thousands/µL      Monocytes Absolute 0 66 Thousand/µL      Eosinophils Absolute 0 16 Thousand/µL      Basophils Absolute 0 04 Thousands/µL     POCT pregnancy, urine [[id number]]  (Normal) Resulted:  11/08/18 1654    Lab Status:  Final result Updated:  11/08/18 1654     EXT PREG TEST UR (Ref: Negative) Negative                 CTA ED chest PE study   Final Result by Sara Wyman MD (11/08 1829)      No acute CT findings  Workstation performed: [id number]         XR chest 2 views   Final Result by Katty Wu DO (11/08 1705)      No acute cardiopulmonary disease  Workstation performed: [id number]                    Procedures  ECG 12 Lead Documentation  Date/Time: 11/8/2018 4:34 PM  Performed by: Jolie Gardner  Authorized by: Jolie Gardner     Indications / Diagnosis:  Chest pain  ECG reviewed by me, the ED Provider: yes    Patient location:  ED  Previous ECG:     Previous ECG:  Compared to current    Comparison ECG info: Oct 15, 2017    Similarity:  No change    Comparison to cardiac monitor: Yes    Interpretation:     Interpretation: normal    Quality:     Tracing quality:  Limited by artifact  Rate:     ECG rate:  97    ECG rate assessment: tachycardic    Rhythm:     Rhythm: sinus tachycardia    Ectopy:     Ectopy: none    QRS:     QRS axis:  Normal    QRS intervals:  Normal  Conduction:     Conduction: normal    ST segments:     ST segments:  Normal  T waves:     T waves: normal             Phone Contacts  ED Phone Contact    ED Course  ED Course as of Nov 08 1922   Thu Nov 08, 2018   1837 Feels better, resting comfortably  Playing on cell phone            HEART Risk Score      Most Recent Value   History  0 Filed at: 11/08/2018 1853   ECG  1 Filed at: 11/08/2018 1853   Age  0 Filed at: 11/08/2018 1853   Risk Factors  0 Filed at: 11/08/2018 1853   Troponin  0 Filed at: 11/08/2018 1853   Heart Score Risk Calculator   History  0 Filed at: 11/08/2018 1853   ECG  1 Filed at: 11/08/2018 1853   Age  0 Filed at: 11/08/2018 1853   Risk Factors  0 Filed at: 11/08/2018 1853   Troponin  0 Filed at: 11/08/2018 1853   HEART Score  1 Filed at: 11/08/2018 1853   HEART Score  1 Filed at: 11/08/2018 1853                            MDM  Number of Diagnoses or Management Options  Chest pain: new and requires workup  Diagnosis management comments: DDX including but not limited to: pneumonia, pleural effusion, CHF, PE, PTX, ACS, MI, asthma exacerbation, doubt ICH, migraine headache, tension headache, anxiety  Plan: CXR  Labs  EKG  Trop  DDimer  dispo pending  Will give migraine cocktail  Amount and/or Complexity of Data Reviewed  Clinical lab tests: ordered and reviewed  Tests in the radiology section of CPT®: ordered and reviewed  Independent visualization of images, tracings, or specimens: yes    Risk of Complications, Morbidity, and/or Mortality  Presenting problems: moderate  Management options: low  General comments: 27 y/o with chest pain  Similar presentation as prior ED visits for same  Workup unremarkable  Had elevated DDimder  PE study negative for acute abnormalities  She has felt better with rest alone  Symptom free at this time  Requested refill of home med, albuterol  Ambulating in department  Return parameters provided  Pt understands and agrees with plan  Patient Progress  Patient progress: stable    CritCare Time    Disposition  Final diagnoses:   Chest pain     Time reflects when diagnosis was documented in both MDM as applicable and the Disposition within this note     Time User Action Codes Description Comment    11/8/2018  7:20 PM Mejia Irwin Add [R07 9] Chest pain       ED Disposition     ED Disposition Condition Comment    Discharge  Clarance Bence discharge to home/self care      Condition at discharge: Good        Follow-up Information     Follow up With Specialties Details Why Natividad Almazan MD Internal Medicine Call in 1 day for routine follow up [facility]  [phone]            Patient's Medications   Discharge Prescriptions    No medications on file     No discharge procedures on file      ED Provider  Electronically Signed by           Junaid Rene PA-C  11/08/18 1957

## 2018-11-09 NOTE — DISCHARGE INSTRUCTIONS
Dolor de pecho   LO QUE NECESITA SABER:   ¿Qué provoca el dolor en el pecho? El dolor en el pecho puede ser provocado por maicol variedad de condiciones, algunas no tan serias y otras que son de peligro mortal  El dolor de pecho también puede ser un síntoma de un problema digestivo, jemma la acidez o maicol úlcera estomacal  Un ataque de ansiedad o maicol emoción satinder, jemma el enojo, también pueden provocar dolor de [place]  Maicol infección, inflamación o fractura en un hueso o cartílago en el pecho podría provocar dolor o molestia  En ocasiones el dolor torácico o la presión en el pecho pueden ser el resultado de lauri circulación de la jacqueline al corazón (angina)  El dolor de pecho también puede ser causado por trastornos potencialmente mortales jemma un ataque al corazón o un coágulo de [employer]  ¿Cuáles otros síntomas podrían presentarse con el dolor en el pecho? · Maicol sensación de ardor detrás del esternón    · Ritmo cardíaco acelerado o lento     · Fiebre o sudoración     · Náuseas o vómito     · Dificultad para respirar     · Yahoo o presión que se propaga del pecho a la espalda, mandíbula o brazo     · Sensación de debilidad, cansancio o desmayo  ¿Cómo se diagnostica la causa del dolor en el pecho? Mercer médico lo examinará  Describa mercer dolor en el pecho con el comfort detalle que sea posible  Dígale dónde le duele y cuándo empezó el dolor  Coméntele si usted nota algo que hace empeorar o mejorar el dolor  Además infórmele si el dolor es geraldine o intermitente  Mercer médico le preguntará cuáles son los [place] sincere y acerca de cualquier condición médica que tenga  También lo examinará  Es posible que también deba hacerse alguno de los siguientes exámenes:  · Un electrocardiograma  es un examen que registra la actividad eléctrica de mercer corazón       · Los análisis de jacqueline:  revisan si hay daños en el corazón y signos de ataque cardíaco      · Un ecocardiograma  [place] ondas de [place] para hill si la jacqueline fluye normalmente a través del corazón  · Un ultrasonido, maicol radiografía, maicol tomografía computarizada o maicol imagen por resonancia magnética (IRM)  podría mostrar la causa de mercer dolor de pecho  Es posible que le administren líquido de contraste para que mercer corazón se francis mejor en las imágenes  Dígale al médico si usted alguna vez ha tenido maicol reacción alérgica al líquido de [place]  No entre a la peter donde se realiza la resonancia magnética con algo de metal  El metal puede causar lesiones serias  Dígale al médico si usted tiene algo de metal por dentro o sobre mercer cuerpo  · Maicol endoscopia  puede hacerse para revisar si tiene úlceras o problemas con el esófago  ¿Cómo se trata el dolor en el pecho? Se le podrán administrar medicamentos para tratar la causa del dolor de pecho  Por ejemplo, analgésicos, medicamentos para la ansiedad o medicamentos para aumentar el flujo de jacqueline al corazón  No  tome ciertos medicamentos sin antes preguntarle a mercer médico  Estos incluyen JOSHUA, suplementos vitamínicos o a base de hierbas u hormonas (estrógeno o progestágeno)  ¿Cuáles son [place] [employer] consejos para vivir maicol fide dickson? Los siguientes son consejos generales de samantha  Si mercer dolor de pecho es causado por un problema cardíaco, mercer médico le dará consejos específicos a seguir  · No fume  La nicotina y otros químicos contenidos en los cigarrillos y cigarros pueden causar daño a anuj pulmones y el corazón  Pida información a mercer médico si usted actualmente fuma y necesita ayuda para dejar de fumar  Los cigarrillos electrónicos o tabaco sin humo todavía contienen nicotina  Consulte con mercer médico antes de [employer]  · Consuma maicol variedad de alimentos saludables y bajos en grasas  Los alimentos saludables incluyen frutas, verduras, pan integral, productos lácteos bajos en grasa, frijoles, howard magras y pescado  Pida más información acerca de maicol dieta saludable para el corazón      · Pregunte acerca de la [place]  Mercer médico le dirá cuáles actividades limitar y cuáles evitar  Pregunte cuándo [employer], regresar a mercer trabajo y [employer]  Pida más información acerca de un plan de ejercicio adecuado para usted  · Mantenga un peso saludable  Consulte con mercer médico cuánto debería pesar  Solicite que lo ayude a crear un plan para bajar de peso si tiene sobrepeso  Llame al 911 si presenta:   · Usted tiene alguno de los siguientes signos de un ataque cardíaco:      ¨ Estornudos, presión, o dolor en mercer pecho que dura mas de 5 minutos o regresa  ¨ Malestar o dolor en mercer espalda, blanche, mandíbula, abdomen, o brazo     ¨ Dificultad para respirar    ¨ Náuseas o vómito    ¨ Siente un desvanecimiento o tiene sudores fríos especialmente en el pecho o dificultad para respirar  ¿Cuándo scott buscar atención inmediata? · La inflamación en mercer pecho empeora, aun con tratamiento  · Usted tose o vomita jacqueline  · Anuj heces son negras o tienen jacqueline  · Usted no puede dejar de vomitar o le duele al tragar  ¿Cuándo scott comunicarme con mi médico?   · Usted tiene preguntas o inquietudes acerca de mercer condición o cuidado  ACUERDOS SOBRE MERCER CUIDADO:   Usted tiene el derecho de ayudar a planear mercer cuidado  Aprenda todo lo que pueda sobre mercer condición y jemma darle tratamiento  Discuta anuj opciones de tratamiento con anuj médicos para decidir el cuidado que usted desea recibir  Usted siempre tiene el derecho de rechazar el tratamiento  Esta información es sólo para uso en educación  Mercer intención no es darle un consejo médico sobre enfermedades o tratamientos  Colsulte con mercer Ginette Bour[place] farmacéutico antes de seguir cualquier régimen médico para saber si es seguro y efectivo para usted  © 2017 [address] [employer] Information is for End User's use only and may not be sold, redistributed or otherwise used for commercial purposes   All illustrations and images included in CareNotes® are the copyrighted property of A D A M , Inc  or Grant Holliday

## 2018-12-08 ENCOUNTER — HOSPITAL ENCOUNTER (EMERGENCY)
Facility: HOSPITAL | Age: 33
Discharge: HOME | End: 2018-12-09
Attending: EMERGENCY MEDICINE | Admitting: EMERGENCY MEDICINE
Payer: COMMERCIAL

## 2018-12-08 VITALS
BODY MASS INDEX: 22.73 KG/M2 | HEIGHT: 63 IN | RESPIRATION RATE: 20 BRPM | WEIGHT: 128.31 LBS | DIASTOLIC BLOOD PRESSURE: 89 MMHG | SYSTOLIC BLOOD PRESSURE: 133 MMHG | HEART RATE: 85 BPM | OXYGEN SATURATION: 100 %

## 2018-12-08 DIAGNOSIS — F41.0 PANIC ATTACK: Primary | ICD-10-CM

## 2018-12-08 PROCEDURE — 93005 ELECTROCARDIOGRAM TRACING: CPT

## 2018-12-08 PROCEDURE — 99283 EMERGENCY DEPT VISIT LOW MDM: CPT

## 2018-12-08 RX ORDER — LORAZEPAM 1 MG/1
1 TABLET ORAL 3 TIMES DAILY PRN
Qty: 15 TABLET | Refills: 0 | Status: SHIPPED | OUTPATIENT
Start: 2018-12-08 | End: 2019-11-16

## 2018-12-08 RX ORDER — LORAZEPAM 1 MG/1
1 TABLET ORAL ONCE
Status: COMPLETED | OUTPATIENT
Start: 2018-12-08 | End: 2018-12-08

## 2018-12-08 RX ADMIN — LORAZEPAM 1 MG: 1 TABLET ORAL at 23:18

## 2018-12-09 LAB
ATRIAL RATE: 76 BPM
P AXIS: 79 DEGREES
PR INTERVAL: 140 MS
QRS AXIS: 71 DEGREES
QRSD INTERVAL: 96 MS
QT INTERVAL: 382 MS
QTC INTERVAL: 429 MS
T WAVE AXIS: 54 DEGREES
VENTRICULAR RATE: 76 BPM

## 2018-12-09 PROCEDURE — 93010 ELECTROCARDIOGRAM REPORT: CPT | Performed by: INTERNAL MEDICINE

## 2018-12-09 NOTE — DISCHARGE INSTRUCTIONS
Take Ativan as directed  Follow-up with Psychiatry  Follow-up with psychology  Follow-up with PCP  Follow up with emergency department symptoms persist or exacerbate  Ansiedad   LO QUE NECESITA SABER:   La ansiedad es maicol condición que provoca que usted sienta preocupación o miedo  El estrés familiar o laboral, fumar, la cafeína y el alcohol pueden aumentar barker riesgo para sufrir ansiedad  Ciertos medicamentos o condiciones de [address] pueden aumentar barker riesgo  La ansiedad podría comenzar gradualmente y puede convertirse en maicol condición a chaz plazo si no se controla o se trata  INSTRUCCIONES SOBRE EL TREVA HOSPITALARIA:   Llame al 911 si presenta:   · Usted tiene dolor de pecho, presión o pesadez que pueden llegar a propagarse a lexie hombros, brazos, mandíbula, blanche o espalda    · Usted siente deseos de lastimarse o lastimar a alguien más  Busque atención médica de inmediato si:   · Se siente mareado, aturdido o que se va a desmayar  Pregúntele a barker Ely Valdes vitaminas y minerales son adecuados para usted  · Lexie síntomas empeoran o no mejoran con el tratamiento  · Usted piensa que barker medicamento podría estar provocándole efectos secundarios  · Barker ansiedad saqib que realice lexie actividades diarias  · Tiene nuevos síntomas desde barker última consulta  · Usted tiene preguntas o inquietudes acerca de barker condición o cuidado  Medicamentos:   · Medicamentos  para ayudarle a sentirse más calmado y relajado y disminuir lexie síntomas  · [place] lexie medicamentos jemma se le haya indicado  Llame a barker médico si usted piensa que el medicamento no está ayudando o si tiene efectos secundarios  Infórmele si es alérgico a cualquier medicamento  Mantenga maicol lista actualizada de los [employer], las vitaminas y los productos herbales que sincere  Incluya los siguientes datos de los medicamentos: cantidad, frecuencia y motivo de administración   Traiga con usted la lista o los envases de la píldoras a lexie citas de seguimiento  Lleve la lista de los medicamentos con usted en lisa de maicol emergencia  Programe maicol bakari con mercer médico dentro de 2 semanas o jemma se le indique:  Anote anuj preguntas para que se acuerde de hacerlas darrick anuj visitas  Maneje la ansiedad:   · Vaya a terapia jemma se le indique  La terapia cognitiva conceptual puede ayudarlo a entender y cambiar la forma que usted reacciona a los eventos que provocan anuj síntomas  · Busque maneras de controlar anuj síntomas  [employer] jemma el ejercicio, meditación o escuchar música pueden ayudarlo a relajarse  · Practique la respiración profunda  La respiración puede cambiar la forma jemma reacciona mercer cuerpo al estrés  Enfóquese en respirar lento y profundo varias veces al día, o darrick un ataque de ansiedad  Respire por la nariz y exhale por la boca  · No fume  La nicotina puede aumentar mercer ansiedad  No use cigarrillos electrónicos o tabaco sin humo en vez de cigarrillos o para tratar de dejar de fumar  Todos estos aún contienen nicotina  Pida a mercer médico información si usted fuma actualmente y [place] para dejar de hacerlo  · No consuma cafeína  La cafeína puede empeorar anuj síntomas  No consuma alimentos o bebidas que tengan el propósito de aumentar mercer nivel de [place]  · Limite o no consuma bebidas alcohólicas  Pregúntele a mercer médico si usted puede lu alcohol  Es posible que usted no pueda ingerir alcohol si sincere ciertos medicamentos para la ansiedad o la depresión  Limite el consumo de alcohol a 1 trago por día si es lewis  Si usted es hombre, limite el consumo de alcohol a 2 tragos por día  Un trago equivale a 12 onzas de cerveza, 5 onzas de vino o 1 onza y ½ de licor  © 2016 [address] is for End User's use only and may not be sold, redistributed or otherwise used for commercial purposes   All illustrations and images included in CareNotes® are the copyrighted property of A D A M , Inc  or Grant Miya  Esta información es sólo para uso en educación  Mercer intención no es darle un consejo médico sobre enfermedades o tratamientos  Colsulte con mercer Ginette Bourdon farmacéutico antes de seguir cualquier régimen médico para saber si es seguro y efectivo para usted  Ansiedad   LO QUE NECESITA SABER:   La ansiedad es maicol afección que provoca que usted se sienta extremadamente preocupado o nervioso  Los sentimientos son nash dixie que pueden causar problemas en lexie actividades diarias o el sueño  La ansiedad puede desencadenarse por algo que teme o puede ocurrir sin maicol causa  El estrés familiar o laboral, fumar, la cafeína y el alcohol pueden aumentar mercer riesgo para sufrir ansiedad  Ciertos medicamentos o condiciones de [address] pueden aumentar mercer riesgo  La ansiedad puede convertirse en maicol afección de larga duración si no se controla ni se trata  INSTRUCCIONES SOBRE EL TREVA HOSPITALARIA:   Llame al 911 si presenta:   · Usted tiene dolor de pecho, presión o pesadez que pueden llegar a propagarse a lexie hombros, brazos, mandíbula, blanche o espalda    · Usted siente deseos de lastimarse o lastimar a alguien más  Pregúntele a mercer Pranav Kacey vitaminas y minerales son adecuados para usted  · Lexie síntomas empeoran o no mejoran con el tratamiento  · Mercer ansiedad saqib que realice lexie actividades diarias  · Tiene nuevos síntomas desde mercer última consulta  · Usted tiene preguntas o inquietudes acerca de mercer condición o cuidado  Medicamentos:   · Medicamentos,  para ayudarle a sentirse más calmado y relajado y disminuir lexie síntomas  · [place] lexie medicamentos jemma se le haya indicado  Consulte con mercer médico si usted genoveva que mercer medicamento no le está ayudando o si presenta efectos secundarios  Infórmele si es alérgico a algún medicamento  Mantenga maicol lista actualizada de los [employer], las vitaminas y los productos herbales que sincere   Incluya los [employer] medicamentos: cantidad, frecuencia y motivo de administración  Traiga con usted la lista o los envases de la píldoras a anuj citas de seguimiento  Lleve la lista de los medicamentos con usted en lisa de maicol emergencia  Programe maicol bakari con mercer médico dentro de 2 semanas o jemma se le indique:  Anote anuj preguntas para que se acuerde de hacerlas darrick anuj visitas  Maneje la ansiedad:   · Hable con alguien sobre mercer ansiedad  Mercer médico puede sugerirle que reciba consejería  La terapia cognitiva conceptual puede ayudarlo a entender y cambiar la forma que usted reacciona a los eventos que provocan anuj síntomas  Usted podría sentirse más cómodo hablando con un familiar o amigo sobre mercer ansiedad  Elija a alguien que usted sepa que será comprensivo y alentador  · Aprenda a relajarse  [employer] jemma el ejercicio, meditación o escuchar música pueden ayudarlo a relajarse  Pase tiempo con amigos, o shira cosas que disfruta  · Practique la respiración profunda  La respiración profunda puede ayudarlo a relajarse cuando se sienta ansioso  Enfóquese en respirar lento y profundo varias veces al día, o darrick un ataque de ansiedad  Respire por la nariz y exhale por la boca  · Fidelina maicol rutina para dormir  El sueño regular puede ayudarlo a sentirse más tranquilo darrick el día  Julisa Daunt a dormirse y levántese a la misma hora todos los días  No francis televisión ni use el ordenador jessica antes de [id number]  Mercer habitación debe ser confortable, oscura y silenciosa  · Consuma alimentos saludables y variados  Los alimentos saludables incluyen frutas, verduras, productos lácteos bajos en grasa, howard magras, pescado, panes integrales y frijoles cocidos  Los alimentos saludables pueden ayudarlo a sentir menos ansidedad y [employer]  · Realice actividad física con regularidad  El ejercicio puede ayudarlo a aumentar mercer nivel de [place]   El ejercicio también puede elevar mercer estado de ánimo y [place] a dormir mejor  Mercer médico puede ayudarle a crear un plan de ejercicios  · No fume  La nicotina y otros químicos en los cigarrillos y cigarros pueden aumentar la ansiedad  Pida información a mercer médico si usted actualmente fuma y necesita ayuda para dejar de fumar  Los cigarrillos electrónicos o tabaco sin humo todavía contienen nicotina  Consulte con mercer médico antes de [employer]  · No consuma cafeína  La cafeína puede empeorar anuj síntomas  No consuma alimentos o bebidas que tengan el propósito de aumentar mercer nivel de [place]  · Limite o no consuma bebidas alcohólicas  Pregúntele a mercer médico si usted puede lu alcohol  Es posible que usted no pueda ingerir alcohol si sincere ciertos medicamentos para la ansiedad o la depresión  Limite el consumo de alcohol a 1 trago por día si es lewis  Si usted es hombre, limite el consumo de alcohol a 2 tragos por día  Un trago equivale a 12 onzas de cerveza, 5 onzas de vino o 1 onza y ½ de licor  · No use drogas  Las drogas también pueden agravar la ansiedad  También pueden dificultar el manejo de la ansiedad  Hable con mercer médico si usted consume drogas y necesita ayuda para dejarlas  © 2017 [address] Information is for End User's use only and may not be sold, redistributed or otherwise used for commercial purposes  All illustrations and images included in CareNotes® are the copyrighted property of A D A M , Inc  or Grant Holliday  Esta información es sólo para uso en educación  Mercer intención no es darle un consejo médico sobre enfermedades o tratamientos  Colsulte con mercer Burlene Byram farmacéutico antes de seguir cualquier régimen médico para saber si es seguro y efectivo para usted

## 2018-12-09 NOTE — ED PROVIDER NOTES
History  Chief Complaint   Patient presents with    Panic Attack     Patients family states they were home having a family gathering when his wife began feeling very anxious and SOB  Patient is a 77-year-old female with history of anxiety, asthma, panic attack the presents emergency department panic attack for 2 hours  Patient's  presents with patient today and provides most of patient history and translation  Patient is Citizen of [place]-speaking primarily  Patient's  stated that the sent down to dinner this evening when patient got up from the dinner table, started pacing, and walked immediately outside  Patient's  follow-up patient outside and asked her what was wrong, and patient replied I feel like the walls are closing in    Patient  states that patient has a psychiatrist but only had 3 appointments in the past 3 months, and has not followed up with her therapist   Additionally, patient's  stated that patient ran out of prescribed psychiatric medications  Patient denies palliative in provocative factors  Patient denies not effective treatments  Patient denies nausea, vomiting, fever, and chills  Patient denies diarrhea, constipation, and urinary symptoms  Patient denies sick contacts or recent travel  Patient denies harming herself, others, or property  Patient denies mood swings, and auditory and visual hallucinations  Patient states that she feels jumpy" and that she cannot be in a closed space  Patient denies recent live stressor or hardship  Patient denies recent fall or trauma  Patient denies chest pain, shortness of breath, abdominal pain  Patient is not in acute distress  Patient was pacing the length of the room as most of history was glean from patient's   Patient refused to sit on stretcher for examination  Attributed the O2 sat of 98 with heart rate of 89  Patient was compliant and appropriate with physical examination    Patient is smiling at several points during the exam, rubbing her hands together and looking around the room, not making frequent eye contact with provider or family members including her   No verbal or physical outbursts from patient  Will check ECG and will deliver Ativan and reassess patient standpoint  Will have patient follow up with Psychiatry, Psychology, and PCP and will prescribe five days of Ativan to bridge patient to Psychiatric care        History provided by:  Patient and spouse   used: No (Spouse)    Panic Attack   Presenting symptoms: no aggressive behavior, no agitation, no delusions, no disorganized speech, no disorganized thought process, no hallucinations, no homicidal ideas, no paranoid behavior, no self-mutilation, no suicidal thoughts, no suicidal threats and no suicide attempt    Patient accompanied by:  Family member  Degree of incapacity (severity):  Mild  Onset quality:  Sudden  Duration:  2 hours  Timing:  Constant  Progression:  Worsening  Chronicity:  Recurrent  Context: not alcohol use, not drug abuse, not medication, not recent medication change and not stressful life event    Treatment compliance:  Untreated  Worsened by:  Nothing  Ineffective treatments:  None tried  Associated symptoms: anxiety    Associated symptoms: no abdominal pain, no anhedonia, no chest pain, no decreased need for sleep, no euphoric mood, no fatigue, no feelings of worthlessness, no headaches, no hyperventilation and no irritability    Risk factors: no hx of suicide attempts        Prior to Admission Medications   Prescriptions Last Dose Informant Patient Reported? Taking? EPINEPHrine (EPIPEN) 0 3 mg/0 3 mL SOAJ   No No   Sig: Inject 0 3 mL into the shoulder, thigh, or buttocks once for 1 dose If needed for allergic reaction  Proceed immediately to ED if you use the EpiPen     Magnesium Hydroxide (MAGNESIA PO) 12/8/2018 at Unknown time  Yes Yes   Sig: Take by mouth   SUMAtriptan (IMITREX) 50 mg tablet 12/8/2018 at Unknown time  Yes Yes   Sig: Take 50 mg by mouth once as needed for migraine   etanercept (ENBREL) 50 mg/mL SOSY 12/8/2018 at Unknown time  Yes Yes   Sig: Inject under the skin once a week   hydrOXYzine HCL (ATARAX) 25 mg tablet 12/8/2018 at Unknown time  Yes Yes   Sig: Take 25 mg by mouth 3 (three) times a day   topiramate (TOPAMAX) 50 MG tablet 12/8/2018 at Unknown time  Yes Yes   Sig: Take 25 mg by mouth 3 (three) times a day        Facility-Administered Medications: None       Past Medical History:   Diagnosis Date    Anxiety     Arthritis     Asthma     Fibromyalgia     Panic attack     Rheumatoid arthritis ([facility] [address] )        Past Surgical History:   Procedure Laterality Date    TUBAL LIGATION      TUBAL LIGATION         History reviewed  No pertinent family history  I have reviewed and agree with the history as documented  Social History   Substance Use Topics    Smoking status: Never Smoker    Smokeless tobacco: Never Used    Alcohol use No        Review of Systems   Constitutional: Negative for chills, fatigue, fever and irritability  Respiratory: Negative for cough, chest tightness and shortness of breath  Cardiovascular: Negative for chest pain and palpitations  Gastrointestinal: Negative for abdominal pain, constipation, diarrhea, nausea and vomiting  Genitourinary: Negative for difficulty urinating, dysuria and frequency  Musculoskeletal: Negative for back pain and gait problem  Skin: Negative for pallor and rash  Allergic/Immunologic: Negative for environmental allergies and food allergies  Neurological: Negative for dizziness and headaches  Psychiatric/Behavioral: Negative for agitation, confusion, hallucinations, homicidal ideas, paranoia, self-injury and suicidal ideas  The patient is nervous/anxious  All other systems reviewed and are negative        Physical Exam  Physical Exam   Constitutional: She is oriented to person, place, and time  She appears well-developed and well-nourished  She is cooperative  Non-toxic appearance  She does not have a sickly appearance  She does not appear ill  No distress  HENT:   Head: Normocephalic and atraumatic  Right Ear: Hearing and external ear normal  No drainage, swelling or tenderness  No mastoid tenderness  No decreased hearing is noted  Left Ear: Hearing and external ear normal  No drainage, swelling or tenderness  No mastoid tenderness  No decreased hearing is noted  Nose: Nose normal    Mouth/Throat: Uvula is midline, oropharynx is clear and moist and mucous membranes are normal    Eyes: Pupils are equal, round, and reactive to light  Conjunctivae, EOM and lids are normal  Right eye exhibits no discharge  Left eye exhibits no discharge  Neck: Trachea normal, normal range of motion, full passive range of motion without pain and phonation normal  Neck supple  No JVD present  No tracheal tenderness present  No tracheal deviation present  Cardiovascular: Normal rate, regular rhythm, normal heart sounds, intact distal pulses and normal pulses  Pulmonary/Chest: Effort normal and breath sounds normal  No stridor  She has no decreased breath sounds  She has no wheezes  She has no rhonchi  She has no rales  She exhibits no tenderness and no crepitus  Abdominal: Soft  Bowel sounds are normal  She exhibits no distension  There is no tenderness  There is no guarding  Musculoskeletal: Normal range of motion  Lymphadenopathy:        Head (right side): No submental, no submandibular, no tonsillar, no preauricular, no posterior auricular and no occipital adenopathy present  Head (left side): No submental, no submandibular, no tonsillar, no preauricular, no posterior auricular and no occipital adenopathy present  She has no cervical adenopathy  Right cervical: No superficial cervical, no deep cervical and no posterior cervical adenopathy present         Left cervical: No superficial cervical, no deep cervical and no posterior cervical adenopathy present  Neurological: She is alert and oriented to person, place, and time  She has normal strength and normal reflexes  No sensory deficit  Reflex Scores:       Patellar reflexes are 2+ on the right side and 2+ on the left side  Skin: Skin is warm and intact  Capillary refill takes less than 2 seconds  She is not diaphoretic  Psychiatric: Her speech is normal and behavior is normal  Judgment and thought content normal  Her mood appears anxious  Cognition and memory are normal    Nursing note and vitals reviewed  Vital Signs  ED Triage Vitals [12/08/18 2222]   Temp Pulse Respirations Blood Pressure SpO2   -- 85 20 133/89 100 %      Temp src Heart Rate Source Patient Position - Orthostatic VS BP Location FiO2 (%)   -- Monitor Standing Right arm --      Pain Score       --           Vitals:    12/08/18 2222   BP: 133/89   Pulse: 85   Patient Position - Orthostatic VS: Standing       Visual Acuity      ED Medications  Medications   LORazepam (ATIVAN) tablet 1 mg (1 mg Oral Given 12/8/18 5523)       Diagnostic Studies  Results Reviewed     None                 No orders to display              Procedures  ECG 12 Lead Documentation  Date/Time: 12/8/2018 11:26 PM  Performed by: Toro Feliz  Authorized by: Toro Feliz     Indications / Diagnosis:   Anxiety  ECG reviewed by me, the ED Provider: yes    Patient location:  ED  Previous ECG:     Previous ECG:  Compared to current    Comparison ECG info:  No significant change was found when compared with ECG of November 8, 2018    Similarity:  No change    Comparison to cardiac monitor: Yes    Interpretation:     Interpretation: normal    Rate:     ECG rate:  76    ECG rate assessment: normal    Rhythm:     Rhythm: sinus rhythm      Rhythm comment:  Sinus arrhythmia  Ectopy:     Ectopy: none    QRS:     QRS axis:  Normal  Conduction:     Conduction: normal    ST segments:     ST segments:  Normal  T waves:     T waves: normal             Phone Contacts  ED Phone Contact    ED Course                               MDM  Number of Diagnoses or Management Options  Panic attack:   Diagnosis management comments: Delivered Ativan in ED; patient verbalized decrease in anxiety symptoms status post medication delivery  ECG with normal sinus rhythm  Prescribed 5 days (15 doses) of Ativan PRN and counseled patient and patient's  on medication administration and side effects  Counseled patient on deep breathing techniques to be used 1st before taking Ativan PRN for panic attack  Follow-up with psychology  Follow-up with Psychiatry  Follow-up with PCP  Follow up with emergency department symptoms persist or exacerbate  Patient and patient's  demonstrates verbal understanding of all discharge instructions and follow-up       Amount and/or Complexity of Data Reviewed  Tests in the medicine section of CPT®: ordered and reviewed    Risk of Complications, Morbidity, and/or Mortality  Presenting problems: low    Patient Progress  Patient progress: stable    CritCare Time    Disposition  Final diagnoses:   Panic attack     Time reflects when diagnosis was documented in both MDM as applicable and the Disposition within this note     Time User Action Codes Description Comment    12/8/2018 11:38 PM Georgia Villafuerte Add [F41 0] Panic attack       ED Disposition     ED Disposition Condition Comment    Discharge  Randy Dillon discharge to home/self care      Condition at discharge: Stable        Follow-up Information     Follow up With Specialties Details Why [address] , [place] Internal Medicine Call in 2 days for further evaluation of symptoms [place] [facility] Internal Medicine [address] Call in 2 days for further evaluation of symptoms [address] [facility] - [address] Internal Medicine, [address] [place], [place], [address] [facility] Psychology Call in 2 days for further evaluation of symptoms [facility] Emergency Medicine Go to As needed [address] [facility] [place] [address]  [phone] [place] ED, [address] [place], [place], [address]          Discharge Medication List as of 12/9/2018 12:01 AM      START taking these medications    Details   LORazepam (ATIVAN) 1 mg tablet Take 1 tablet (1 mg total) by mouth 3 (three) times a day as needed for anxiety for up to 5 days, Starting Sat 12/8/2018, Until Thu 12/13/2018, Print         CONTINUE these medications which have NOT CHANGED    Details   etanercept (ENBREL) 50 mg/mL SOSY Inject under the skin once a week, Historical Med      hydrOXYzine HCL (ATARAX) 25 mg tablet Take 25 mg by mouth 3 (three) times a day, Historical Med      Magnesium Hydroxide (MAGNESIA PO) Take by mouth, Historical Med      SUMAtriptan (IMITREX) 50 mg tablet Take 50 mg by mouth once as needed for migraine, Historical Med      topiramate (TOPAMAX) 50 MG tablet Take 25 mg by mouth 3 (three) times a day  , Historical Med      EPINEPHrine (EPIPEN) 0 3 mg/0 3 mL SOAJ Inject 0 3 mL into the shoulder, thigh, or buttocks once for 1 dose If needed for allergic reaction  Proceed immediately to ED if you use the EpiPen , Starting Mon 8/21/2017, Print           No discharge procedures on file      ED Provider  Electronically Signed by           Robert Griffith PA-C  12/09/18 4991

## 2019-05-11 ENCOUNTER — HOSPITAL ENCOUNTER (EMERGENCY)
Facility: HOSPITAL | Age: 34
Discharge: HOME | End: 2019-05-11
Attending: EMERGENCY MEDICINE | Admitting: EMERGENCY MEDICINE
Payer: COMMERCIAL

## 2019-05-11 ENCOUNTER — APPOINTMENT (EMERGENCY)
Dept: CT IMAGING | Facility: HOSPITAL | Age: 34
End: 2019-05-11
Payer: COMMERCIAL

## 2019-05-11 VITALS
SYSTOLIC BLOOD PRESSURE: 102 MMHG | TEMPERATURE: 98.3 F | HEART RATE: 78 BPM | WEIGHT: 130 LBS | RESPIRATION RATE: 16 BRPM | OXYGEN SATURATION: 98 % | BODY MASS INDEX: 23.04 KG/M2 | DIASTOLIC BLOOD PRESSURE: 64 MMHG | HEIGHT: 63 IN

## 2019-05-11 DIAGNOSIS — R10.9 NONSPECIFIC ABDOMINAL PAIN: Primary | ICD-10-CM

## 2019-05-11 LAB
ALBUMIN SERPL BCP-MCNC: 3.9 G/DL (ref 3.5–5)
ALP SERPL-CCNC: 65 U/L (ref 46–116)
ALT SERPL W P-5'-P-CCNC: 24 U/L (ref 12–78)
ANION GAP SERPL CALCULATED.3IONS-SCNC: 7 MMOL/L (ref 4–13)
AST SERPL W P-5'-P-CCNC: 14 U/L (ref 5–45)
BASOPHILS # BLD AUTO: 0.04 THOUSANDS/ΜL (ref 0–0.1)
BASOPHILS NFR BLD AUTO: 1 % (ref 0–1)
BILIRUB SERPL-MCNC: 0.5 MG/DL (ref 0.2–1)
BILIRUB UR QL STRIP: NEGATIVE
BUN SERPL-MCNC: 15 MG/DL (ref 5–25)
CALCIUM SERPL-MCNC: 8.8 MG/DL (ref 8.3–10.1)
CHLORIDE SERPL-SCNC: 105 MMOL/L (ref 100–108)
CLARITY UR: CLEAR
CO2 SERPL-SCNC: 27 MMOL/L (ref 21–32)
COLOR UR: YELLOW
CREAT SERPL-MCNC: 0.74 MG/DL (ref 0.6–1.3)
EOSINOPHIL # BLD AUTO: 0.22 THOUSAND/ΜL (ref 0–0.61)
EOSINOPHIL NFR BLD AUTO: 3 % (ref 0–6)
ERYTHROCYTE [DISTWIDTH] IN BLOOD BY AUTOMATED COUNT: 14.6 % (ref 11.6–15.1)
EXT PREG TEST URINE: NEGATIVE
GFR SERPL CREATININE-BSD FRML MDRD: 105 ML/MIN/1.73SQ M
GLUCOSE SERPL-MCNC: 70 MG/DL (ref 65–140)
GLUCOSE UR STRIP-MCNC: NEGATIVE MG/DL
HCT VFR BLD AUTO: 35.5 % (ref 34.8–46.1)
HGB BLD-MCNC: 11.6 G/DL (ref 11.5–15.4)
HGB UR QL STRIP.AUTO: NEGATIVE
IMM GRANULOCYTES # BLD AUTO: 0.02 THOUSAND/UL (ref 0–0.2)
IMM GRANULOCYTES NFR BLD AUTO: 0 % (ref 0–2)
KETONES UR STRIP-MCNC: ABNORMAL MG/DL
LEUKOCYTE ESTERASE UR QL STRIP: NEGATIVE
LIPASE SERPL-CCNC: 150 U/L (ref 73–393)
LYMPHOCYTES # BLD AUTO: 2.84 THOUSANDS/ΜL (ref 0.6–4.47)
LYMPHOCYTES NFR BLD AUTO: 33 % (ref 14–44)
MCH RBC QN AUTO: 27 PG (ref 26.8–34.3)
MCHC RBC AUTO-ENTMCNC: 32.7 G/DL (ref 31.4–37.4)
MCV RBC AUTO: 83 FL (ref 82–98)
MONOCYTES # BLD AUTO: 0.99 THOUSAND/ΜL (ref 0.17–1.22)
MONOCYTES NFR BLD AUTO: 12 % (ref 4–12)
NEUTROPHILS # BLD AUTO: 4.51 THOUSANDS/ΜL (ref 1.85–7.62)
NEUTS SEG NFR BLD AUTO: 51 % (ref 43–75)
NITRITE UR QL STRIP: NEGATIVE
NRBC BLD AUTO-RTO: 0 /100 WBCS
PH UR STRIP.AUTO: 6.5 [PH]
PLATELET # BLD AUTO: 238 THOUSANDS/UL (ref 149–390)
PMV BLD AUTO: 11.3 FL (ref 8.9–12.7)
POTASSIUM SERPL-SCNC: 3.7 MMOL/L (ref 3.5–5.3)
PROT SERPL-MCNC: 7.3 G/DL (ref 6.4–8.2)
PROT UR STRIP-MCNC: NEGATIVE MG/DL
RBC # BLD AUTO: 4.3 MILLION/UL (ref 3.81–5.12)
SODIUM SERPL-SCNC: 139 MMOL/L (ref 136–145)
SP GR UR STRIP.AUTO: 1.02 (ref 1–1.03)
TROPONIN I SERPL-MCNC: <0.02 NG/ML
UROBILINOGEN UR QL STRIP.AUTO: 0.2 E.U./DL
WBC # BLD AUTO: 8.62 THOUSAND/UL (ref 4.31–10.16)

## 2019-05-11 PROCEDURE — 96361 HYDRATE IV INFUSION ADD-ON: CPT

## 2019-05-11 PROCEDURE — 93005 ELECTROCARDIOGRAM TRACING: CPT

## 2019-05-11 PROCEDURE — 83690 ASSAY OF LIPASE: CPT | Performed by: PHYSICIAN ASSISTANT

## 2019-05-11 PROCEDURE — 85025 COMPLETE CBC W/AUTO DIFF WBC: CPT | Performed by: PHYSICIAN ASSISTANT

## 2019-05-11 PROCEDURE — 84484 ASSAY OF TROPONIN QUANT: CPT | Performed by: PHYSICIAN ASSISTANT

## 2019-05-11 PROCEDURE — 96374 THER/PROPH/DIAG INJ IV PUSH: CPT

## 2019-05-11 PROCEDURE — 99284 EMERGENCY DEPT VISIT MOD MDM: CPT | Performed by: PHYSICIAN ASSISTANT

## 2019-05-11 PROCEDURE — 36415 COLL VENOUS BLD VENIPUNCTURE: CPT | Performed by: PHYSICIAN ASSISTANT

## 2019-05-11 PROCEDURE — 80053 COMPREHEN METABOLIC PANEL: CPT | Performed by: PHYSICIAN ASSISTANT

## 2019-05-11 PROCEDURE — 74177 CT ABD & PELVIS W/CONTRAST: CPT

## 2019-05-11 PROCEDURE — 99284 EMERGENCY DEPT VISIT MOD MDM: CPT

## 2019-05-11 PROCEDURE — 81025 URINE PREGNANCY TEST: CPT | Performed by: PHYSICIAN ASSISTANT

## 2019-05-11 PROCEDURE — 81003 URINALYSIS AUTO W/O SCOPE: CPT | Performed by: PHYSICIAN ASSISTANT

## 2019-05-11 RX ORDER — IBUPROFEN 400 MG/1
400 TABLET ORAL EVERY 6 HOURS PRN
Qty: 12 TABLET | Refills: 0 | Status: SHIPPED | OUTPATIENT
Start: 2019-05-11 | End: 2019-11-16

## 2019-05-11 RX ORDER — KETOROLAC TROMETHAMINE 30 MG/ML
15 INJECTION, SOLUTION INTRAMUSCULAR; INTRAVENOUS ONCE
Status: COMPLETED | OUTPATIENT
Start: 2019-05-11 | End: 2019-05-11

## 2019-05-11 RX ORDER — ONDANSETRON 4 MG/1
4 TABLET, FILM COATED ORAL EVERY 6 HOURS
Qty: 12 TABLET | Refills: 0 | Status: SHIPPED | OUTPATIENT
Start: 2019-05-11 | End: 2019-11-16

## 2019-05-11 RX ORDER — DICYCLOMINE HCL 20 MG
20 TABLET ORAL ONCE
Status: COMPLETED | OUTPATIENT
Start: 2019-05-11 | End: 2019-05-11

## 2019-05-11 RX ORDER — ONDANSETRON 4 MG/1
4 TABLET, ORALLY DISINTEGRATING ORAL ONCE
Status: COMPLETED | OUTPATIENT
Start: 2019-05-11 | End: 2019-05-11

## 2019-05-11 RX ORDER — DICYCLOMINE HCL 20 MG
20 TABLET ORAL 2 TIMES DAILY
Qty: 6 TABLET | Refills: 0 | Status: SHIPPED | OUTPATIENT
Start: 2019-05-11 | End: 2019-11-16

## 2019-05-11 RX ADMIN — KETOROLAC TROMETHAMINE 15 MG: 30 INJECTION, SOLUTION INTRAMUSCULAR at 21:50

## 2019-05-11 RX ADMIN — DICYCLOMINE HYDROCHLORIDE 20 MG: 20 TABLET ORAL at 21:49

## 2019-05-11 RX ADMIN — SODIUM CHLORIDE 1000 ML: 0.9 INJECTION, SOLUTION INTRAVENOUS at 20:45

## 2019-05-11 RX ADMIN — IOHEXOL 100 ML: 350 INJECTION, SOLUTION INTRAVENOUS at 22:01

## 2019-05-11 RX ADMIN — ONDANSETRON 4 MG: 4 TABLET, ORALLY DISINTEGRATING ORAL at 21:49

## 2019-05-12 LAB
ATRIAL RATE: 81 BPM
P AXIS: 76 DEGREES
PR INTERVAL: 142 MS
QRS AXIS: 76 DEGREES
QRSD INTERVAL: 96 MS
QT INTERVAL: 362 MS
QTC INTERVAL: 420 MS
T WAVE AXIS: 54 DEGREES
VENTRICULAR RATE: 81 BPM

## 2019-05-12 PROCEDURE — 93010 ELECTROCARDIOGRAM REPORT: CPT | Performed by: INTERNAL MEDICINE

## 2019-09-06 ENCOUNTER — HOSPITAL ENCOUNTER (EMERGENCY)
Facility: HOSPITAL | Age: 34
Discharge: HOME | End: 2019-09-06
Attending: EMERGENCY MEDICINE | Admitting: EMERGENCY MEDICINE
Payer: COMMERCIAL

## 2019-09-06 VITALS
HEIGHT: 63 IN | HEART RATE: 80 BPM | WEIGHT: 123.68 LBS | OXYGEN SATURATION: 100 % | RESPIRATION RATE: 18 BRPM | DIASTOLIC BLOOD PRESSURE: 72 MMHG | TEMPERATURE: 98.1 F | BODY MASS INDEX: 21.91 KG/M2 | SYSTOLIC BLOOD PRESSURE: 125 MMHG

## 2019-09-06 DIAGNOSIS — M54.6 ACUTE BILATERAL THORACIC BACK PAIN: Primary | ICD-10-CM

## 2019-09-06 PROCEDURE — 99284 EMERGENCY DEPT VISIT MOD MDM: CPT | Performed by: NURSE PRACTITIONER

## 2019-09-06 PROCEDURE — 96372 THER/PROPH/DIAG INJ SC/IM: CPT

## 2019-09-06 PROCEDURE — 99283 EMERGENCY DEPT VISIT LOW MDM: CPT

## 2019-09-06 RX ORDER — OXYCODONE HYDROCHLORIDE AND ACETAMINOPHEN 5; 325 MG/1; MG/1
1 TABLET ORAL ONCE
Status: COMPLETED | OUTPATIENT
Start: 2019-09-06 | End: 2019-09-06

## 2019-09-06 RX ORDER — HYDROCODONE BITARTRATE AND ACETAMINOPHEN 5; 325 MG/1; MG/1
1 TABLET ORAL EVERY 6 HOURS PRN
Qty: 12 TABLET | Refills: 0 | Status: SHIPPED | OUTPATIENT
Start: 2019-09-06

## 2019-09-06 RX ORDER — KETOROLAC TROMETHAMINE 30 MG/ML
30 INJECTION, SOLUTION INTRAMUSCULAR; INTRAVENOUS ONCE
Status: COMPLETED | OUTPATIENT
Start: 2019-09-06 | End: 2019-09-06

## 2019-09-06 RX ORDER — METHYLPREDNISOLONE 4 MG/1
TABLET ORAL
Qty: 21 TABLET | Refills: 0 | Status: SHIPPED | OUTPATIENT
Start: 2019-09-06

## 2019-09-06 RX ADMIN — KETOROLAC TROMETHAMINE 30 MG: 30 INJECTION, SOLUTION INTRAMUSCULAR at 14:47

## 2019-09-06 RX ADMIN — OXYCODONE HYDROCHLORIDE AND ACETAMINOPHEN 1 TABLET: 5; 325 TABLET ORAL at 14:47

## 2019-09-06 NOTE — ED PROVIDER NOTES
History  Chief Complaint   Patient presents with    Back Pain     central back pain radiating to neck with deep breath; hx of back pain; currently being treated for fibromyalgia/rheumatoid arthritis     This is a 24-year-old mostly Frisian-speaking female that is accompanied by her significant other with reports of upper back pain  She reports that she woke up with a pain like this  She has had chronic back pain for months but never this bad  She has pain with movement of the upper torso  Her pain is between the shoulder blades with associated spasm  She has pain with very deep inspiration  No trauma  Denies any upper extremity radiculopathy  Prior to Admission Medications   Prescriptions Last Dose Informant Patient Reported? Taking? EPINEPHrine (EPIPEN) 0 3 mg/0 3 mL SOAJ   No No   Sig: Inject 0 3 mL into the shoulder, thigh, or buttocks once for 1 dose If needed for allergic reaction  Proceed immediately to ED if you use the EpiPen     LORazepam (ATIVAN) 1 mg tablet   No No   Sig: Take 1 tablet (1 mg total) by mouth 3 (three) times a day as needed for anxiety for up to 5 days   Magnesium Hydroxide (MAGNESIA PO)   Yes No   Sig: Take by mouth   SUMAtriptan (IMITREX) 50 mg tablet   Yes No   Sig: Take 50 mg by mouth once as needed for migraine   dicyclomine (BENTYL) 20 mg tablet   No No   Sig: Take 1 tablet (20 mg total) by mouth 2 (two) times a day for 3 days   etanercept (ENBREL) 50 mg/mL SOSY   Yes No   Sig: Inject under the skin once a week   hydrOXYzine HCL (ATARAX) 25 mg tablet   Yes No   Sig: Take 25 mg by mouth 3 (three) times a day   ibuprofen (MOTRIN) 400 mg tablet   No No   Sig: Take 1 tablet (400 mg total) by mouth every 6 (six) hours as needed for mild pain for up to 3 days   ondansetron (ZOFRAN) 4 mg tablet   No No   Sig: Take 1 tablet (4 mg total) by mouth every 6 (six) hours for 3 days   topiramate (TOPAMAX) 50 MG tablet   Yes No   Sig: Take 25 mg by mouth 3 (three) times a day Facility-Administered Medications: None       Past Medical History:   Diagnosis Date    Anxiety     Arthritis     Asthma     Fibromyalgia     Panic attack     Rheumatoid arthritis ([facility] [address] )        Past Surgical History:   Procedure Laterality Date    TUBAL LIGATION      TUBAL LIGATION         History reviewed  No pertinent family history  I have reviewed and agree with the history as documented  Social History     Tobacco Use    Smoking status: Never Smoker    Smokeless tobacco: Never Used   Substance Use Topics    Alcohol use: No    Drug use: No        Review of Systems   Constitutional: Negative for activity change, fatigue and fever  HENT: Negative for congestion, ear pain, rhinorrhea and sore throat  Eyes: Negative  Respiratory: Negative for cough, shortness of breath and wheezing  Gastrointestinal: Negative for abdominal pain, diarrhea, nausea and vomiting  Endocrine: Negative  Genitourinary: Negative for difficulty urinating, dyspareunia, dysuria, flank pain, frequency, menstrual problem, pelvic pain, urgency, vaginal bleeding, vaginal discharge and vaginal pain  Musculoskeletal: Positive for back pain  Negative for arthralgias and myalgias  Skin: Negative for color change and pallor  Neurological: Negative for dizziness, speech difficulty, weakness and headaches  Hematological: Negative for adenopathy  Psychiatric/Behavioral: Negative for confusion  Physical Exam  Physical Exam   Constitutional: She is oriented to person, place, and time  She appears well-developed and well-nourished  She is cooperative  Non-toxic appearance  She does not have a sickly appearance  She does not appear ill  No distress  HENT:   Head: Normocephalic and atraumatic  Right Ear: Tympanic membrane and external ear normal    Left Ear: Tympanic membrane and external ear normal    Nose: No rhinorrhea, sinus tenderness or nasal deformity  No epistaxis   Right sinus exhibits no maxillary sinus tenderness and no frontal sinus tenderness  Left sinus exhibits no maxillary sinus tenderness and no frontal sinus tenderness  Mouth/Throat: Oropharynx is clear and moist and mucous membranes are normal  Normal dentition  Eyes: Pupils are equal, round, and reactive to light  EOM are normal    Neck: Normal range of motion  Neck supple  Cardiovascular: Normal rate, regular rhythm and normal heart sounds  No murmur heard  Pulmonary/Chest: Effort normal and breath sounds normal  No accessory muscle usage  No respiratory distress  She has no wheezes  She has no rales  She exhibits no tenderness  Abdominal: Soft  She exhibits no distension  There is no guarding  Musculoskeletal: Normal range of motion  She exhibits no edema or tenderness  Thoracic back: She exhibits pain and spasm  Back:    Lymphadenopathy:     She has no cervical adenopathy  Neurological: She is alert and oriented to person, place, and time  She exhibits normal muscle tone  Skin: Skin is warm and dry  No rash noted  No erythema  Psychiatric: She has a normal mood and affect  Nursing note and vitals reviewed        Vital Signs  ED Triage Vitals [09/06/19 1353]   Temperature Pulse Respirations Blood Pressure SpO2   98 1 °F (36 7 °C) 80 18 125/72 100 %      Temp Source Heart Rate Source Patient Position - Orthostatic VS BP Location FiO2 (%)   Oral Monitor Sitting Left arm --      Pain Score       8           Vitals:    09/06/19 1353   BP: 125/72   Pulse: 80   Patient Position - Orthostatic VS: Sitting         Visual Acuity      ED Medications  Medications   ketorolac (TORADOL) injection 30 mg (has no administration in time range)   oxyCODONE-acetaminophen (PERCOCET) 5-325 mg per tablet 1 tablet (has no administration in time range)       Diagnostic Studies  Results Reviewed     None                 No orders to display              Procedures  Procedures       ED Course                               MDM  Number of Diagnoses or Management Options  Acute bilateral thoracic back pain: new and requires workup     Amount and/or Complexity of Data Reviewed  Independent visualization of images, tracings, or specimens: yes    Patient Progress  Patient progress: stable      Disposition  Final diagnoses:   Acute bilateral thoracic back pain     Time reflects when diagnosis was documented in both MDM as applicable and the Disposition within this note     Time User Action Codes Description Comment    9/6/2019  2:44 PM Candis Vallejo [M54 6] Acute bilateral thoracic back pain       ED Disposition     ED Disposition Condition Date/Time Comment    Discharge Stable Fri Sep 6, 2019  2:44 PM Cherise Peters discharge to home/self care              Follow-up Information     Follow up With Specialties Details Why Contact Info    [facility] Physical Therapy Go to   [phone]          Patient's Medications   Discharge Prescriptions    HYDROCODONE-ACETAMINOPHEN (NORCO) 5-325 MG PER TABLET    Take 1 tablet by mouth every 6 (six) hours as needed (Not relieved by Anti-inflammatory) for up to 12 dosesMax Daily Amount: 4 tablets       Start Date: 9/6/2019  End Date: --       Order Dose: 1 tablet       Quantity: 12 tablet    Refills: 0    METHYLPREDNISOLONE (MEDROL) 4 MG TABLET THERAPY PACK    Use as directed on package       Start Date: 9/6/2019  End Date: --       Order Dose: --       Quantity: 21 tablet    Refills: 0         ED Provider  Electronically Signed by           [facility]  09/06/19 6347

## 2019-09-09 ENCOUNTER — TELEPHONE (OUTPATIENT)
Dept: PHYSICAL THERAPY | Facility: OTHER | Age: 34
End: 2019-09-09

## 2019-09-09 NOTE — TELEPHONE ENCOUNTER
Nurse spoke to patients  Nellieemilia Ramirez while patient present  regarding comp spine program  Explained to patient through  interpreting that we do not accept there health insurance  Nurse informed that PT can be done and they would need to contact  Their PCP and insurance for a referral     Patient appreciative of information  And call  No further questions voiced      Referral closed

## 2019-11-16 ENCOUNTER — HOSPITAL ENCOUNTER (EMERGENCY)
Facility: HOSPITAL | Age: 34
Discharge: HOME | End: 2019-11-16
Attending: EMERGENCY MEDICINE | Admitting: EMERGENCY MEDICINE
Payer: COMMERCIAL

## 2019-11-16 VITALS
RESPIRATION RATE: 21 BRPM | HEIGHT: 63 IN | SYSTOLIC BLOOD PRESSURE: 126 MMHG | OXYGEN SATURATION: 100 % | HEART RATE: 74 BPM | TEMPERATURE: 98.1 F | BODY MASS INDEX: 22.38 KG/M2 | WEIGHT: 126.32 LBS | DIASTOLIC BLOOD PRESSURE: 96 MMHG

## 2019-11-16 DIAGNOSIS — M06.9 RHEUMATOID ARTHRITIS (HCC): Primary | ICD-10-CM

## 2019-11-16 PROCEDURE — 96372 THER/PROPH/DIAG INJ SC/IM: CPT

## 2019-11-16 PROCEDURE — 99283 EMERGENCY DEPT VISIT LOW MDM: CPT

## 2019-11-16 PROCEDURE — 99283 EMERGENCY DEPT VISIT LOW MDM: CPT | Performed by: EMERGENCY MEDICINE

## 2019-11-16 RX ORDER — MELATONIN 10 MG
CAPSULE ORAL
COMMUNITY
Start: 2019-09-24

## 2019-11-16 RX ORDER — ACETAMINOPHEN 325 MG/1
975 TABLET ORAL ONCE
Status: COMPLETED | OUTPATIENT
Start: 2019-11-16 | End: 2019-11-16

## 2019-11-16 RX ORDER — MORPHINE SULFATE 15 MG/1
15 TABLET ORAL EVERY 4 HOURS PRN
Status: DISCONTINUED | OUTPATIENT
Start: 2019-11-16 | End: 2019-11-16 | Stop reason: HOSPADM

## 2019-11-16 RX ORDER — OLANZAPINE 10 MG/1
4 INJECTION, POWDER, LYOPHILIZED, FOR SOLUTION INTRAMUSCULAR ONCE
Status: COMPLETED | OUTPATIENT
Start: 2019-11-16 | End: 2019-11-16

## 2019-11-16 RX ORDER — KETOROLAC TROMETHAMINE 30 MG/ML
15 INJECTION, SOLUTION INTRAMUSCULAR; INTRAVENOUS ONCE
Status: COMPLETED | OUTPATIENT
Start: 2019-11-16 | End: 2019-11-16

## 2019-11-16 RX ORDER — PREDNISONE 20 MG/1
40 TABLET ORAL DAILY
Qty: 6 TABLET | Refills: 0 | Status: SHIPPED | OUTPATIENT
Start: 2019-11-16 | End: 2019-11-19

## 2019-11-16 RX ORDER — NAPROXEN 500 MG/1
500 TABLET ORAL
COMMUNITY
Start: 2019-09-16 | End: 2021-07-14

## 2019-11-16 RX ADMIN — WATER: 1 INJECTION INTRAMUSCULAR; INTRAVENOUS; SUBCUTANEOUS at 04:42

## 2019-11-16 RX ADMIN — OLANZAPINE 4 MG: 10 INJECTION, POWDER, FOR SOLUTION INTRAMUSCULAR at 04:41

## 2019-11-16 RX ADMIN — PREDNISONE 50 MG: 20 TABLET ORAL at 04:40

## 2019-11-16 RX ADMIN — KETOROLAC TROMETHAMINE 15 MG: 30 INJECTION, SOLUTION INTRAMUSCULAR at 04:41

## 2019-11-16 RX ADMIN — DICLOFENAC SODIUM 4 G: 10 GEL TOPICAL at 05:01

## 2019-11-16 RX ADMIN — ACETAMINOPHEN 975 MG: 325 TABLET, FILM COATED ORAL at 04:40

## 2019-11-16 NOTE — ED PROVIDER NOTES
History  Chief Complaint   Patient presents with    Shoulder Pain     PT co right shoulder pain and left wrist pain that started approximately 2 hours ago  Pt has Hx of RA   Wrist Pain     HPI     Patient is a pleasant 37year old female who presents with right shoulder and left wrist pain  No redness or swelling  She has a history of RA and notes that this feels exactly like her prior episodes of RA flares  No f/c/s  No vomiting  No chest pain  This started about 2 hours ago  No associated rashes  Pain is worse with movement of the joints  MDM well appearing 37 yof, discussed possible imaging but patient does not want this, will treat symptoms, have her followup with her rheumatologist     The patient (and any family present) verbalized understanding of the discharge instructions and warnings that would necessitate return to the Emergency Department  Gave verbal in addition to written discharge instructions  Specifically highlighted areas of special concern regarding the discharge instructions and return precautions  Furthermore, I expressed that the follow up instructions were serious and should not be simply dismissed  Follow up is an integral part of the patients care and should NOT be taken lightly or dismissed  Patient expressed understanding of this and understands that follow up is now their responsibility  All questions were answered prior to discharge  Prior to Admission Medications   Prescriptions Last Dose Informant Patient Reported? Taking? EPINEPHrine (EPIPEN) 0 3 mg/0 3 mL SOAJ   No Yes   Sig: Inject 0 3 mL into the shoulder, thigh, or buttocks once for 1 dose If needed for allergic reaction  Proceed immediately to ED if you use the EpiPen     HYDROcodone-acetaminophen (NORCO) 5-325 mg per tablet   No Yes   Sig: Take 1 tablet by mouth every 6 (six) hours as needed (Not relieved by Anti-inflammatory) for up to 12 dosesMax Daily Amount: 4 tablets   LORazepam (ATIVAN) 1 mg tablet   No Yes   Sig: Take 1 tablet (1 mg total) by mouth 3 (three) times a day as needed for anxiety for up to 5 days   Magnesium Hydroxide (MAGNESIA PO)   Yes Yes   Sig: Take by mouth   Melatonin 10 MG CAPS   Yes Yes   Sig: TAKE 1 CAPSULE BY MOUTH EVERYDAY AT BEDTIME   dicyclomine (BENTYL) 20 mg tablet   No Yes   Sig: Take 1 tablet (20 mg total) by mouth 2 (two) times a day for 3 days   etanercept (ENBREL) 50 mg/mL SOSY   Yes Yes   Sig: Inject under the skin once a week   hydrOXYzine HCL (ATARAX) 25 mg tablet   Yes Yes   Sig: Take 25 mg by mouth 3 (three) times a day   ibuprofen (MOTRIN) 400 mg tablet   No Yes   Sig: Take 1 tablet (400 mg total) by mouth every 6 (six) hours as needed for mild pain for up to 3 days   methylPREDNISolone (MEDROL) 4 MG tablet therapy pack Not Taking at Unknown time  No No   Sig: Use as directed on package   Patient not taking: Reported on 11/16/2019   naproxen (NAPROSYN) 500 mg tablet   Yes Yes   Sig: Take 500 mg by mouth      Facility-Administered Medications: None       Past Medical History:   Diagnosis Date    Anxiety     Arthritis     Asthma     Fibromyalgia     Panic attack     Rheumatoid arthritis ([facility] [address] )        Past Surgical History:   Procedure Laterality Date    TUBAL LIGATION      TUBAL LIGATION         History reviewed  No pertinent family history  I have reviewed and agree with the history as documented  Social History     Tobacco Use    Smoking status: Never Smoker    Smokeless tobacco: Never Used   Substance Use Topics    Alcohol use: No    Drug use: No        Review of Systems   Musculoskeletal: Positive for arthralgias  Physical Exam  Physical Exam   Constitutional: She is oriented to person, place, and time  She appears well-developed and well-nourished  HENT:   Head: Normocephalic and atraumatic     Right Ear: External ear normal    Left Ear: External ear normal    Eyes: Conjunctivae and EOM are normal  Neck: Normal range of motion  Neck supple  No JVD present  No tracheal deviation present  Cardiovascular: Normal rate, regular rhythm and normal heart sounds  Pulmonary/Chest: Effort normal  No respiratory distress  She has no wheezes  She has no rales  Abdominal: Soft  Bowel sounds are normal  There is no tenderness  There is no rebound and no guarding  Musculoskeletal: She exhibits no edema or tenderness  Neurological: She is alert and oriented to person, place, and time  Skin: Skin is warm and dry  No rash noted  No erythema  Psychiatric: She has a normal mood and affect  Thought content normal    Drowsy but responds to commands, moving all 4 extremities   Nursing note and vitals reviewed        Vital Signs  ED Triage Vitals [11/16/19 0342]   Temperature Pulse Respirations Blood Pressure SpO2   98 1 °F (36 7 °C) 74 21 126/96 100 %      Temp Source Heart Rate Source Patient Position - Orthostatic VS BP Location FiO2 (%)   Oral Monitor Sitting Right arm --      Pain Score       Worst Possible Pain           Vitals:    11/16/19 0342   BP: 126/96   Pulse: 74   Patient Position - Orthostatic VS: Sitting         Visual Acuity      ED Medications  Medications   diclofenac sodium (VOLTAREN) 1 % topical gel 4 g (has no administration in time range)   morphine (MSIR) IR tablet 15 mg (has no administration in time range)   OLANZapine (ZyPREXA) IM injection 4 mg (4 mg Intramuscular Given 11/16/19 0441)   ketorolac (TORADOL) injection 15 mg (15 mg Intramuscular Given 11/16/19 0441)   acetaminophen (TYLENOL) tablet 975 mg (975 mg Oral Given 11/16/19 0440)   predniSONE tablet 50 mg (50 mg Oral Given 11/16/19 0440)   sterile water injection **ADS Override Pull** (  Given 11/16/19 0442)       Diagnostic Studies  Results Reviewed     None                 No orders to display              Procedures  Procedures       ED Course                               MDM    Disposition  Final diagnoses:   None     ED Disposition     None      Follow-up Information    None         Patient's Medications   Discharge Prescriptions    No medications on file     No discharge procedures on file      ED Provider  Electronically Signed by           Nakul Tom MD  11/16/19 1629

## 2019-12-03 ENCOUNTER — HOSPITAL ENCOUNTER (EMERGENCY)
Facility: HOSPITAL | Age: 34
Discharge: HOME | End: 2019-12-03
Attending: EMERGENCY MEDICINE | Admitting: EMERGENCY MEDICINE
Payer: COMMERCIAL

## 2019-12-03 VITALS
RESPIRATION RATE: 20 BRPM | OXYGEN SATURATION: 100 % | HEART RATE: 77 BPM | TEMPERATURE: 97.7 F | SYSTOLIC BLOOD PRESSURE: 127 MMHG | DIASTOLIC BLOOD PRESSURE: 86 MMHG | WEIGHT: 121 LBS | BODY MASS INDEX: 21.43 KG/M2

## 2019-12-03 DIAGNOSIS — M25.521 RIGHT ELBOW PAIN: Primary | ICD-10-CM

## 2019-12-03 DIAGNOSIS — Z87.39 HISTORY OF RHEUMATOID ARTHRITIS: ICD-10-CM

## 2019-12-03 PROCEDURE — 96372 THER/PROPH/DIAG INJ SC/IM: CPT

## 2019-12-03 PROCEDURE — 99284 EMERGENCY DEPT VISIT MOD MDM: CPT | Performed by: EMERGENCY MEDICINE

## 2019-12-03 PROCEDURE — 99283 EMERGENCY DEPT VISIT LOW MDM: CPT

## 2019-12-03 RX ORDER — KETOROLAC TROMETHAMINE 30 MG/ML
30 INJECTION, SOLUTION INTRAMUSCULAR; INTRAVENOUS ONCE
Status: COMPLETED | OUTPATIENT
Start: 2019-12-03 | End: 2019-12-03

## 2019-12-03 RX ORDER — ACETAMINOPHEN 325 MG/1
975 TABLET ORAL ONCE
Status: COMPLETED | OUTPATIENT
Start: 2019-12-03 | End: 2019-12-03

## 2019-12-03 RX ORDER — LIDOCAINE 50 MG/G
1 PATCH TOPICAL ONCE
Status: DISCONTINUED | OUTPATIENT
Start: 2019-12-03 | End: 2019-12-04 | Stop reason: HOSPADM

## 2019-12-03 RX ORDER — NAPROXEN 500 MG/1
500 TABLET ORAL 2 TIMES DAILY PRN
Qty: 60 TABLET | Refills: 0 | Status: SHIPPED | OUTPATIENT
Start: 2019-12-03 | End: 2021-07-14

## 2019-12-03 RX ADMIN — ACETAMINOPHEN 975 MG: 325 TABLET, FILM COATED ORAL at 22:06

## 2019-12-03 RX ADMIN — KETOROLAC TROMETHAMINE 30 MG: 30 INJECTION, SOLUTION INTRAMUSCULAR at 22:07

## 2019-12-03 RX ADMIN — LIDOCAINE 1 PATCH: 50 PATCH TOPICAL at 22:07

## 2019-12-04 NOTE — ED PROVIDER NOTES
History  Chief Complaint   Patient presents with    Elbow Pain     pt c/o r elbow pain that radiates up to the shoulder; pt has hx of rheumatoid arthritis  pt states the pain worsened today    Shoulder Pain     HPI    Prior to Admission Medications   Prescriptions Last Dose Informant Patient Reported? Taking? EPINEPHrine (EPIPEN) 0 3 mg/0 3 mL SOAJ   No No   Sig: Inject 0 3 mL into the shoulder, thigh, or buttocks once for 1 dose If needed for allergic reaction  Proceed immediately to ED if you use the EpiPen     HYDROcodone-acetaminophen (NORCO) 5-325 mg per tablet   No No   Sig: Take 1 tablet by mouth every 6 (six) hours as needed (Not relieved by Anti-inflammatory) for up to 12 dosesMax Daily Amount: 4 tablets   LORazepam (ATIVAN) 1 mg tablet   No No   Sig: Take 1 tablet (1 mg total) by mouth 3 (three) times a day as needed for anxiety for up to 5 days   Magnesium Hydroxide (MAGNESIA PO)   Yes No   Sig: Take by mouth   Melatonin 10 MG CAPS   Yes No   Sig: TAKE 1 CAPSULE BY MOUTH EVERYDAY AT BEDTIME   dicyclomine (BENTYL) 20 mg tablet   No No   Sig: Take 1 tablet (20 mg total) by mouth 2 (two) times a day for 3 days   etanercept (ENBREL) 50 mg/mL SOSY   Yes No   Sig: Inject under the skin once a week   hydrOXYzine HCL (ATARAX) 25 mg tablet   Yes No   Sig: Take 25 mg by mouth 3 (three) times a day   ibuprofen (MOTRIN) 400 mg tablet   No No   Sig: Take 1 tablet (400 mg total) by mouth every 6 (six) hours as needed for mild pain for up to 3 days   methylPREDNISolone (MEDROL) 4 MG tablet therapy pack   No No   Sig: Use as directed on package   Patient not taking: Reported on 11/16/2019   naproxen (NAPROSYN) 500 mg tablet   Yes No   Sig: Take 500 mg by mouth      Facility-Administered Medications: None       Past Medical History:   Diagnosis Date    Anxiety     Arthritis     Asthma     Fibromyalgia     Panic attack     Rheumatoid arthritis ([facility])        Past Surgical History:   Procedure Laterality Date    TUBAL LIGATION      TUBAL LIGATION         No family history on file  I have reviewed and agree with the history as documented  Social History     Tobacco Use    Smoking status: Never Smoker    Smokeless tobacco: Never Used   Substance Use Topics    Alcohol use: No    Drug use: No        Review of Systems    Physical Exam  Physical Exam   Constitutional: She is oriented to person, place, and time  She appears well-developed and well-nourished  No distress  HENT:   Head: Normocephalic and atraumatic  Eyes: Pupils are equal, round, and reactive to light  Conjunctivae are normal    Neck: Normal range of motion  No tracheal deviation present  Cardiovascular: Normal rate, regular rhythm and intact distal pulses  Pulses:       Radial pulses are 2+ on the right side  Pulmonary/Chest: Effort normal  No respiratory distress  Musculoskeletal:        Right elbow: She exhibits decreased range of motion (significant, due to pain, passive slightly greater than active)  She exhibits no swelling and no effusion  Tenderness (diffuse, worse anteriorly) found  Arms:  No joint erythema  No tenderness throughout the rest of the arm  Normal strength and sensation distally  Neurological: She is alert and oriented to person, place, and time  GCS eye subscore is 4  GCS verbal subscore is 5  GCS motor subscore is 6  Skin: Skin is warm and dry  Psychiatric: She has a normal mood and affect  Her behavior is normal    Nursing note and vitals reviewed        Vital Signs  ED Triage Vitals [12/03/19 2128]   Temperature Pulse Respirations Blood Pressure SpO2   97 7 °F (36 5 °C) 77 20 127/86 100 %      Temp Source Heart Rate Source Patient Position - Orthostatic VS BP Location FiO2 (%)   Oral Monitor Sitting Left arm --      Pain Score       Worst Possible Pain           Vitals:    12/03/19 2128   BP: 127/86   Pulse: 77   Patient Position - Orthostatic VS: Sitting         Visual Acuity      ED Medications  Medications   ketorolac (TORADOL) injection 30 mg (has no administration in time range)   lidocaine (LIDODERM) 5 % patch 1 patch (has no administration in time range)   acetaminophen (TYLENOL) tablet 975 mg (has no administration in time range)       Diagnostic Studies  Results Reviewed     None                 No orders to display              Procedures  Procedures         ED Course                               MDM  Number of Diagnoses or Management Options  History of rheumatoid arthritis: new and does not require workup  Right elbow pain: new and does not require workup  Diagnosis management comments: This is a 38-year-old female who presents here today with right elbow pain  She has a history of rheumatoid arthritis which she takes weekly Enbrel injections  She began having pain in the right elbow at about 1900 this evening, which acutely worsened at about 2100  She took two Advil p m  at that time and came here for evaluation  She denies any injuries to the elbow  She states she has had problems with multiple joints in the past and does not have specific joints are affected  She has had no redness or significant swelling  She endorses worsening pain with movement  The pain radiates up to her shoulder and down to her wrist, especially with certain movements  She denies any known triggers for her pain  She was seen here on 11/16 for right shoulder pain and left wrist pain, and was advised that she needed to follow-up with her rheumatologist for further evaluation  She was given a short course of steroids which she took  The patient states her pain is improved after she was here last time so never followed up with or called her rheumatologist as her symptoms had resolved  She currently has no other joints involved  She states she has been told in the past to take Tylenol arthritis but does that does not work has not been taking this      Review of systems:  Otherwise negative unless stated as above    She is well-appearing, in no acute distress  She does have tenderness to the elbow, primarily anteriorly  She has no noticeable swelling, effusion, erythema  She has significantly decreased range of motion due to pain  She has no tenderness throughout the rest of the arm  She is neurovascularly intact distally  Exam is otherwise unremarkable  She has no findings on exam suggestive of septic arthritis  She has no trauma to suggest underlying bony injury  This is most likely flare of her rheumatoid arthritis causing her pain  As she was here two and half weeks ago for rheumatoid arthritis flare and was put on a short course of steroids at that time I am hesitant to put her back on steroids  I discussed with the patient and her  that she may need a different type her strength of steroids, or just adjustments of her rheumatoid arthritis medications, which at this point in time would be done by her rheumatologist   I discussed with them that we can treat the pain to make her more comfortable, but she will likely have continued pain into the flare is successfully managed  I emphasized to them the importance of following up with her rheumatologist especially as this is the second flare within three weeks  They expressed understanding with this plan         Amount and/or Complexity of Data Reviewed  Decide to obtain previous medical records or to obtain history from someone other than the patient: yes  Review and summarize past medical records: yes          Disposition  Final diagnoses:   Right elbow pain   History of rheumatoid arthritis     Time reflects when diagnosis was documented in both MDM as applicable and the Disposition within this note     Time User Action Codes Description Comment    12/3/2019  9:52 PM Ruel Wood Add [M80 411] Right elbow pain     12/3/2019  9:52 PM Ruel Wood Add [Z87 39] History of rheumatoid arthritis       ED Disposition     ED Disposition Condition Date/Time Comment    Discharge Good Tue Dec 3, 2019  9:52 PM Deon Ivy discharge to home/self care  Follow-up Information     Follow up With Specialties Details Why Margarette Palacios MD Rheumatology Schedule an appointment as soon as possible for a visit  to follow up on your symptoms [facility] [id number]  [phone]            Patient's Medications   Discharge Prescriptions    DICLOFENAC SODIUM (VOLTAREN) 1 %    Apply 2 g topically 4 (four) times a day as needed (pain)       Start Date: 12/3/2019 End Date: --       Order Dose: 2 g       Quantity: 100 g    Refills: 0    NAPROXEN (NAPROSYN) 500 MG TABLET    Take 1 tablet (500 mg total) by mouth 2 (two) times a day as needed (pain)       Start Date: 12/3/2019 End Date: --       Order Dose: 500 mg       Quantity: 60 tablet    Refills: 0     No discharge procedures on file      ED Provider  Electronically Signed by           Mikel Kirkland MD  12/03/19 0792

## 2019-12-04 NOTE — DISCHARGE INSTRUCTIONS
Take the medications as prescribed  Use ice or heat to your elbow as it helps  You need to follow up with your rheumatologist for further evaluation of your pain, and discussion of further management

## 2020-02-24 ENCOUNTER — HOSPITAL ENCOUNTER (EMERGENCY)
Facility: HOSPITAL | Age: 35
Discharge: HOME | End: 2020-02-24
Attending: EMERGENCY MEDICINE
Payer: COMMERCIAL

## 2020-02-24 VITALS
RESPIRATION RATE: 18 BRPM | DIASTOLIC BLOOD PRESSURE: 75 MMHG | BODY MASS INDEX: 21.6 KG/M2 | TEMPERATURE: 97.7 F | WEIGHT: 121.91 LBS | SYSTOLIC BLOOD PRESSURE: 129 MMHG | HEART RATE: 79 BPM | OXYGEN SATURATION: 98 %

## 2020-02-24 DIAGNOSIS — M26.629 TMJ SYNDROME: Primary | ICD-10-CM

## 2020-02-24 PROCEDURE — 96374 THER/PROPH/DIAG INJ IV PUSH: CPT

## 2020-02-24 PROCEDURE — 99284 EMERGENCY DEPT VISIT MOD MDM: CPT | Performed by: PHYSICIAN ASSISTANT

## 2020-02-24 PROCEDURE — 99282 EMERGENCY DEPT VISIT SF MDM: CPT

## 2020-02-24 RX ORDER — OXYCODONE HYDROCHLORIDE AND ACETAMINOPHEN 5; 325 MG/1; MG/1
1 TABLET ORAL ONCE
Status: COMPLETED | OUTPATIENT
Start: 2020-02-24 | End: 2020-02-24

## 2020-02-24 RX ORDER — OXYCODONE HYDROCHLORIDE AND ACETAMINOPHEN 5; 325 MG/1; MG/1
1 TABLET ORAL EVERY 8 HOURS PRN
Qty: 9 TABLET | Refills: 0 | Status: SHIPPED | OUTPATIENT
Start: 2020-02-24

## 2020-02-24 RX ORDER — KETOROLAC TROMETHAMINE 30 MG/ML
15 INJECTION, SOLUTION INTRAMUSCULAR; INTRAVENOUS ONCE
Status: COMPLETED | OUTPATIENT
Start: 2020-02-24 | End: 2020-02-24

## 2020-02-24 RX ADMIN — KETOROLAC TROMETHAMINE 15 MG: 30 INJECTION, SOLUTION INTRAMUSCULAR at 17:43

## 2020-02-24 RX ADMIN — OXYCODONE HYDROCHLORIDE AND ACETAMINOPHEN 1 TABLET: 5; 325 TABLET ORAL at 17:41

## 2020-02-24 NOTE — ED PROVIDER NOTES
History  Chief Complaint   Patient presents with    Dental Pain     to L side onset today, + swelling      36 yo female here with left facial pain  Onset today  She has had it before but never sought treatment for it  Left TMJ  Constant  Worse with jaw movement and pressure  Worse with chewing  No fever, chills, n/v  She admits to grinding her teeth while she sleeps  No trauma or injuries  No rash or redness  History provided by:  Patient   used: No    Dental Pain   Location:  Upper  Upper teeth location: left facial pain/TMJ  Quality:  Aching  Severity:  Moderate  Onset quality:  Sudden  Duration:  1 day  Timing:  Constant  Progression:  Unchanged  Chronicity:  Recurrent  Context: not abscess, cap still on, not crown fracture, not dental caries, not dental fracture, not enamel fracture, filling intact, not intrusion, not malocclusion, normal dentition, not recent dental surgery and not trauma    Relieved by:  Nothing  Worsened by:  Jaw movement  Ineffective treatments:  None tried  Associated symptoms: facial pain    Associated symptoms: no congestion, no difficulty swallowing, no drooling, no facial swelling, no fever, no gum swelling, no headaches, no neck pain, no neck swelling, no oral bleeding, no oral lesions and no trismus        Prior to Admission Medications   Prescriptions Last Dose Informant Patient Reported? Taking? EPINEPHrine (EPIPEN) 0 3 mg/0 3 mL SOAJ   No No   Sig: Inject 0 3 mL into the shoulder, thigh, or buttocks once for 1 dose If needed for allergic reaction  Proceed immediately to ED if you use the EpiPen     HYDROcodone-acetaminophen (NORCO) 5-325 mg per tablet   No No   Sig: Take 1 tablet by mouth every 6 (six) hours as needed (Not relieved by Anti-inflammatory) for up to 12 dosesMax Daily Amount: 4 tablets   LORazepam (ATIVAN) 1 mg tablet   No No   Sig: Take 1 tablet (1 mg total) by mouth 3 (three) times a day as needed for anxiety for up to 5 days   Magnesium Hydroxide (MAGNESIA PO)   Yes No   Sig: Take by mouth   Melatonin 10 MG CAPS   Yes No   Sig: TAKE 1 CAPSULE BY MOUTH EVERYDAY AT BEDTIME   diclofenac sodium (VOLTAREN) 1 %   No No   Sig: Apply 2 g topically 4 (four) times a day as needed (pain)   dicyclomine (BENTYL) 20 mg tablet   No No   Sig: Take 1 tablet (20 mg total) by mouth 2 (two) times a day for 3 days   etanercept (ENBREL) 50 mg/mL SOSY   Yes No   Sig: Inject under the skin once a week   hydrOXYzine HCL (ATARAX) 25 mg tablet   Yes No   Sig: Take 25 mg by mouth 3 (three) times a day   ibuprofen (MOTRIN) 400 mg tablet   No No   Sig: Take 1 tablet (400 mg total) by mouth every 6 (six) hours as needed for mild pain for up to 3 days   methylPREDNISolone (MEDROL) 4 MG tablet therapy pack   No No   Sig: Use as directed on package   Patient not taking: Reported on 11/16/2019   naproxen (NAPROSYN) 500 mg tablet   Yes No   Sig: Take 500 mg by mouth   naproxen (NAPROSYN) 500 mg tablet   No No   Sig: Take 1 tablet (500 mg total) by mouth 2 (two) times a day as needed (pain)      Facility-Administered Medications: None       Past Medical History:   Diagnosis Date    Anxiety     Arthritis     Asthma     Fibromyalgia     Panic attack     Rheumatoid arthritis ([facility] [address] )        Past Surgical History:   Procedure Laterality Date    TUBAL LIGATION      TUBAL LIGATION         History reviewed  No pertinent family history  I have reviewed and agree with the history as documented  E-Cigarette/Vaping     E-Cigarette/Vaping Substances     Social History     Tobacco Use    Smoking status: Never Smoker    Smokeless tobacco: Never Used   Substance Use Topics    Alcohol use: No    Drug use: No       Review of Systems   Constitutional: Negative for activity change, appetite change, chills, diaphoresis, fatigue, fever and unexpected weight change     HENT: Negative for congestion, drooling, facial swelling, mouth sores, rhinorrhea, sinus pressure, sore throat and trouble swallowing  Jaw pain, left TMJ  Eyes: Negative for photophobia and visual disturbance  Respiratory: Negative for apnea, cough, choking, chest tightness, shortness of breath, wheezing and stridor  Cardiovascular: Negative for chest pain, palpitations and leg swelling  Gastrointestinal: Negative for abdominal distention, abdominal pain, blood in stool, constipation, diarrhea, nausea and vomiting  Genitourinary: Negative for decreased urine volume, difficulty urinating, dysuria, enuresis, flank pain, frequency, hematuria and urgency  Musculoskeletal: Negative for arthralgias, myalgias, neck pain and neck stiffness  Skin: Negative for color change, pallor, rash and wound  Allergic/Immunologic: Negative  Neurological: Negative for dizziness, tremors, syncope, weakness, light-headedness, numbness and headaches  Hematological: Negative  Psychiatric/Behavioral: Negative  All other systems reviewed and are negative  Physical Exam  Physical Exam   Constitutional: She is oriented to person, place, and time  She appears well-developed and well-nourished  Non-toxic appearance  She does not have a sickly appearance  She does not appear ill  No distress  HENT:   Head: Normocephalic and atraumatic  Left TMJ tenderness  No deformity  No trismus  Otherwise normal HEENT exam     Eyes: Pupils are equal, round, and reactive to light  EOM and lids are normal    Neck: Normal range of motion  Neck supple  Cardiovascular: Normal rate, regular rhythm, S1 normal, S2 normal, normal heart sounds, intact distal pulses and normal pulses  Exam reveals no gallop, no distant heart sounds, no friction rub and no decreased pulses  No murmur heard  Pulses:       Radial pulses are 2+ on the right side, and 2+ on the left side  Pulmonary/Chest: Effort normal and breath sounds normal  No accessory muscle usage  No apnea, no tachypnea and no bradypnea  No respiratory distress   She has no decreased breath sounds  She has no wheezes  She has no rhonchi  She has no rales  Abdominal: Soft  Normal appearance  She exhibits no distension  There is no tenderness  There is no rigidity, no rebound and no guarding  Musculoskeletal: Normal range of motion  She exhibits no edema, tenderness or deformity  Neurological: She is alert and oriented to person, place, and time  No cranial nerve deficit  GCS eye subscore is 4  GCS verbal subscore is 5  GCS motor subscore is 6  Skin: Skin is warm, dry and intact  No rash noted  She is not diaphoretic  No erythema  No pallor  Psychiatric: Her speech is normal    Nursing note and vitals reviewed  Vital Signs  ED Triage Vitals [02/24/20 1704]   Temperature Pulse Respirations Blood Pressure SpO2   97 7 °F (36 5 °C) 79 18 129/75 98 %      Temp Source Heart Rate Source Patient Position - Orthostatic VS BP Location FiO2 (%)   Oral Monitor Sitting Left arm --      Pain Score       --           Vitals:    02/24/20 1704   BP: 129/75   Pulse: 79   Patient Position - Orthostatic VS: Sitting         Visual Acuity      ED Medications  Medications   ketorolac (TORADOL) injection 15 mg (15 mg Intravenous Given 2/24/20 1743)   oxyCODONE-acetaminophen (PERCOCET) 5-325 mg per tablet 1 tablet (1 tablet Oral Given 2/24/20 1741)       Diagnostic Studies  Results Reviewed     None                 No orders to display              Procedures  Procedures         ED Course                               MDM  Number of Diagnoses or Management Options  TMJ syndrome: new and requires workup  Diagnosis management comments: DDx including but not limited to: parotitis, sinusitis, dental abscess, cellulitis, abscess, trigeminal neuralgia, TMJ syndrome  Risk of Complications, Morbidity, and/or Mortality  Presenting problems: low  Management options: low  General comments: Plan/MDM: 36 yo female with left facial pain  Likely TMJ syndrome  Benign exam otherwise  Low suspicion for infection  Recommended NSAIDs for pain  Percocet for breakthrough pain  Discussed safe use of this medication  F/u PCP  Return parameters provided  Pt understands and agrees with plan  Patient Progress  Patient progress: stable        Disposition  Final diagnoses:   TMJ syndrome     Time reflects when diagnosis was documented in both MDM as applicable and the Disposition within this note     Time User Action Codes Description Comment    2/24/2020  5:37 PM Danny Vallejo [G61 [id number]] TMJ syndrome       ED Disposition     ED Disposition Condition Date/Time Comment    Discharge Stable Mon Feb 24, 2020  5:37 PM Bere Zuniga discharge to home/self care  Follow-up Information     Follow up With Specialties Details Why Linh Solis MD Internal Medicine Call  for follow up appointment [address] [facility] [address] [facility]  [phone]            Discharge Medication List as of 2/24/2020  5:52 PM      START taking these medications    Details   oxyCODONE-acetaminophen (PERCOCET) 5-325 mg per tablet Take 1 tablet by mouth every 8 (eight) hours as needed for moderate painMax Daily Amount: 3 tablets, Starting Mon 2/24/2020, Normal         CONTINUE these medications which have NOT CHANGED    Details   diclofenac sodium (VOLTAREN) 1 % Apply 2 g topically 4 (four) times a day as needed (pain), Starting Tue 12/3/2019, Print      dicyclomine (BENTYL) 20 mg tablet Take 1 tablet (20 mg total) by mouth 2 (two) times a day for 3 days, Starting Sat 5/11/2019, Until Sat 11/16/2019, Print      EPINEPHrine (EPIPEN) 0 3 mg/0 3 mL SOAJ Inject 0 3 mL into the shoulder, thigh, or buttocks once for 1 dose If needed for allergic reaction    Proceed immediately to ED if you use the EpiPen , Starting Mon 8/21/2017, Print      etanercept (ENBREL) 50 mg/mL SOSY Inject under the skin once a week, Historical Med      HYDROcodone-acetaminophen (NORCO) 5-325 mg per tablet Take 1 tablet by mouth every 6 (six) hours as needed (Not relieved by Anti-inflammatory) for up to 12 dosesMax Daily Amount: 4 tablets, Starting Fri 9/6/2019, Print      hydrOXYzine HCL (ATARAX) 25 mg tablet Take 25 mg by mouth 3 (three) times a day, Historical Med      ibuprofen (MOTRIN) 400 mg tablet Take 1 tablet (400 mg total) by mouth every 6 (six) hours as needed for mild pain for up to 3 days, Starting Sat 5/11/2019, Until Sat 11/16/2019, Print      LORazepam (ATIVAN) 1 mg tablet Take 1 tablet (1 mg total) by mouth 3 (three) times a day as needed for anxiety for up to 5 days, Starting Sat 12/8/2018, Until Sat 11/16/2019, Print      Magnesium Hydroxide (MAGNESIA PO) Take by mouth, Historical Med      Melatonin 10 MG CAPS TAKE 1 CAPSULE BY MOUTH EVERYDAY AT BEDTIME, Historical Med      methylPREDNISolone (MEDROL) 4 MG tablet therapy pack Use as directed on package, Print      !! naproxen (NAPROSYN) 500 mg tablet Take 500 mg by mouth, Starting Mon 9/16/2019, Historical Med      !! naproxen (NAPROSYN) 500 mg tablet Take 1 tablet (500 mg total) by mouth 2 (two) times a day as needed (pain), Starting Tue 12/3/2019, Print       !! - Potential duplicate medications found  Please discuss with provider  No discharge procedures on file      PDMP Review     None          ED Provider  Electronically Signed by           Aminata Carr PA-C  02/24/20 1649

## 2020-07-13 ENCOUNTER — APPOINTMENT (EMERGENCY)
Dept: RADIOLOGY | Facility: HOSPITAL | Age: 35
End: 2020-07-13
Payer: COMMERCIAL

## 2020-07-13 ENCOUNTER — HOSPITAL ENCOUNTER (EMERGENCY)
Facility: HOSPITAL | Age: 35
Discharge: HOME | End: 2020-07-13
Attending: EMERGENCY MEDICINE | Admitting: EMERGENCY MEDICINE
Payer: COMMERCIAL

## 2020-07-13 VITALS
BODY MASS INDEX: 23.08 KG/M2 | TEMPERATURE: 98.4 F | SYSTOLIC BLOOD PRESSURE: 121 MMHG | HEART RATE: 70 BPM | WEIGHT: 130.29 LBS | DIASTOLIC BLOOD PRESSURE: 70 MMHG | OXYGEN SATURATION: 98 % | RESPIRATION RATE: 18 BRPM

## 2020-07-13 DIAGNOSIS — M25.552 LEFT HIP PAIN: Primary | ICD-10-CM

## 2020-07-13 PROCEDURE — 99284 EMERGENCY DEPT VISIT MOD MDM: CPT

## 2020-07-13 PROCEDURE — 99284 EMERGENCY DEPT VISIT MOD MDM: CPT | Performed by: EMERGENCY MEDICINE

## 2020-07-13 PROCEDURE — 73502 X-RAY EXAM HIP UNI 2-3 VIEWS: CPT

## 2020-07-13 RX ORDER — PREDNISONE 20 MG/1
40 TABLET ORAL DAILY
Qty: 8 TABLET | Refills: 0 | Status: SHIPPED | OUTPATIENT
Start: 2020-07-13

## 2020-07-13 RX ORDER — PREDNISONE 20 MG/1
40 TABLET ORAL ONCE
Status: COMPLETED | OUTPATIENT
Start: 2020-07-13 | End: 2020-07-13

## 2020-07-13 RX ADMIN — PREDNISONE 40 MG: 20 TABLET ORAL at 03:02

## 2020-07-13 NOTE — ED PROVIDER NOTES
History  Chief Complaint   Patient presents with    Leg Pain     Pt c/o left side upper thigh/hip pain starting 3 days ago  Hx of RA     Patient speaks primarily [place]  Patient declined phone , requests to use her friend who is at bedside and speaks fluent [place]  23-year-old female with a history of rheumatoid arthritis on immunosuppressants who follows with rheumatology presents with 3 days of left anterior hip pain with radiation down the leg  Patient notes pain in the mid hip that worsens with ambulation  Patient states this is a aching pain with a burning sensation radiating down the leg  Patient states that the aching sensation is similar to her prior episodes of rheumatoid arthritis  Patient is on appropriate immunotherapy  Last on corticosteroids 2 weeks ago for her wrists  Patient denies any falls or trauma  Patient denies any recent travel  Patient adamantly denies that she is not pregnant  Patient denies any history of venous thrombosis in herself or her family  Impression and plan:  Left hip pain with a broad differential   Patient denies any inciting event including no trauma  Considering history of RA with frequent use of glucocorticoids, will obtain plain film imaging to evaluate for fracture  Patient denies any history of or risks for VTE  Patient notes pain is similar to prior episodes of rheumatoid arthritis  Ultrasound is unavailable to evaluate for venous thrombosis at this time  Will treat patient symptomatically and have them obtain duplex ultrasound in the morning         History provided by:  Patient  Leg Pain   Location:  Hip  Injury: no    Hip location:  L hip  Pain details:     Quality:  Aching    Radiates to:  L leg    Severity:  Moderate    Onset quality:  Sudden    Timing:  Constant    Progression:  Unchanged  Relieved by:  Nothing  Worsened by:  Bearing weight  Ineffective treatments:  None tried  Associated symptoms: no back pain, no decreased ROM, no fatigue, no fever, no itching, no muscle weakness, no neck pain, no numbness, no stiffness, no swelling and no tingling        Prior to Admission Medications   Prescriptions Last Dose Informant Patient Reported? Taking? EPINEPHrine (EPIPEN) 0 3 mg/0 3 mL SOAJ   No No   Sig: Inject 0 3 mL into the shoulder, thigh, or buttocks once for 1 dose If needed for allergic reaction  Proceed immediately to ED if you use the EpiPen     HYDROcodone-acetaminophen (NORCO) 5-325 mg per tablet   No No   Sig: Take 1 tablet by mouth every 6 (six) hours as needed (Not relieved by Anti-inflammatory) for up to 12 dosesMax Daily Amount: 4 tablets   LORazepam (ATIVAN) 1 mg tablet   No No   Sig: Take 1 tablet (1 mg total) by mouth 3 (three) times a day as needed for anxiety for up to 5 days   Magnesium Hydroxide (MAGNESIA PO)   Yes No   Sig: Take by mouth   Melatonin 10 MG CAPS   Yes No   Sig: TAKE 1 CAPSULE BY MOUTH EVERYDAY AT BEDTIME   diclofenac sodium (VOLTAREN) 1 %   No No   Sig: Apply 2 g topically 4 (four) times a day as needed (pain)   dicyclomine (BENTYL) 20 mg tablet   No No   Sig: Take 1 tablet (20 mg total) by mouth 2 (two) times a day for 3 days   etanercept (ENBREL) 50 mg/mL SOSY   Yes No   Sig: Inject under the skin once a week   hydrOXYzine HCL (ATARAX) 25 mg tablet   Yes No   Sig: Take 25 mg by mouth 3 (three) times a day   ibuprofen (MOTRIN) 400 mg tablet   No No   Sig: Take 1 tablet (400 mg total) by mouth every 6 (six) hours as needed for mild pain for up to 3 days   methylPREDNISolone (MEDROL) 4 MG tablet therapy pack   No No   Sig: Use as directed on package   Patient not taking: Reported on 11/16/2019   naproxen (NAPROSYN) 500 mg tablet   Yes No   Sig: Take 500 mg by mouth   naproxen (NAPROSYN) 500 mg tablet   No No   Sig: Take 1 tablet (500 mg total) by mouth 2 (two) times a day as needed (pain)   oxyCODONE-acetaminophen (PERCOCET) 5-325 mg per tablet   No No   Sig: Take 1 tablet by mouth every 8 (eight) hours as needed for moderate painMax Daily Amount: 3 tablets      Facility-Administered Medications: None       Past Medical History:   Diagnosis Date    Anxiety     Arthritis     Asthma     Fibromyalgia     Panic attack     Rheumatoid arthritis ([address] )        Past Surgical History:   Procedure Laterality Date    TUBAL LIGATION      TUBAL LIGATION         History reviewed  No pertinent family history  I have reviewed and agree with the history as documented  E-Cigarette/Vaping     E-Cigarette/Vaping Substances     Social History     Tobacco Use    Smoking status: Never Smoker    Smokeless tobacco: Never Used   Substance Use Topics    Alcohol use: No    Drug use: No       Review of Systems   Constitutional: Negative for fatigue and fever  Musculoskeletal: Negative for back pain, neck pain and stiffness  Skin: Negative for itching  All other systems reviewed and are negative  Physical Exam  Physical Exam   Constitutional: She appears well-developed and well-nourished  HENT:   Head: Normocephalic and atraumatic  Eyes: Pupils are equal, round, and reactive to light  Neck: Neck supple  Cardiovascular: Normal rate and regular rhythm  Pulmonary/Chest: Effort normal    Abdominal: She exhibits no distension  Musculoskeletal: She exhibits tenderness  She exhibits no deformity  Normal inspection  Normal but slowed range of motion  Tenderness over the entirety of the hip and the proximal femur to palpation, most notably along the femoral neck  No medial tenderness to palpation  All tenderness is along the bony aspect of the femur  There is no pain or tenderness in the groin  Neurological: She is alert  Normal distal neurovascular exam of the left leg including sensory in all dermatomes  Normal hinds and plantar flexion of great toe  Normal 2+ pedal pulse that is symmetric with appropriate capillary refill  Skin: Skin is dry  Psychiatric: She has a normal mood and affect  Vitals reviewed  Vital Signs  ED Triage Vitals [07/13/20 0124]   Temperature Pulse Respirations Blood Pressure SpO2   98 4 °F (36 9 °C) 71 17 124/69 97 %      Temp Source Heart Rate Source Patient Position - Orthostatic VS BP Location FiO2 (%)   Oral Monitor Lying Right arm --      Pain Score       9           Vitals:    07/13/20 0124 07/13/20 0313   BP: 124/69 121/70   Pulse: 71 70   Patient Position - Orthostatic VS: Lying          Visual Acuity      ED Medications  Medications   predniSONE tablet 40 mg (40 mg Oral Given 7/13/20 0302)       Diagnostic Studies  Results Reviewed     None                 XR hip/pelv 2-3 vws left if performed   ED Interpretation by Good Steinberg MD (07/13 0246)   No acute osseus injury      VAS upper limb venous duplex scan, unilateral/limited    (Results Pending)              Procedures  Procedures         ED Course  ED Course as of Jul 13 0404 Mon Jul 13, 2020   0300 Discussed findings with the patient  Discussed risks and benefits of empiric treatment for potential DVT the patient is at lower risk for this that her rheumatic symptoms  After discussion is risks and benefits, patient declined anticoagulation  Agrees to contact vascular in the morning for ultrasound and provided information on this  Patient will be able to receive study within the next few hours and return to the emergency room with any positive findings  Discussed contacting rheumatology discussed continued corticosteroids  Provided her with script for the next few days                                                  MDM      Disposition  Final diagnoses:   Left hip pain     Time reflects when diagnosis was documented in both MDM as applicable and the Disposition within this note     Time User Action Codes Description Comment    7/13/2020  2:30 AM Delisa Na Vallejo [[id number]] Left hip pain       ED Disposition     ED Disposition Condition Date/Time Comment    Discharge Stable Mon Jul 13, 2020  2:30 JIMMY Dave discharge to home/self care  Follow-up Information     Follow up With Specialties Details Why [address] , MD Internal Medicine Schedule an appointment as soon as possible for a visit in 3 days Follow-up and reassessment  Discuss continuation of steroids [address] [facility] Emergency Department Emergency Medicine Go to  If symptoms worsen [address] [facility] [place] [address] [phone] [place] ED, [address] [place], [place], [address]          Discharge Medication List as of 7/13/2020  2:34 AM      START taking these medications    Details   predniSONE 20 mg tablet Take 2 tablets (40 mg total) by mouth daily, Starting Mon 7/13/2020, Print         CONTINUE these medications which have NOT CHANGED    Details   diclofenac sodium (VOLTAREN) 1 % Apply 2 g topically 4 (four) times a day as needed (pain), Starting Tue 12/3/2019, Print      dicyclomine (BENTYL) 20 mg tablet Take 1 tablet (20 mg total) by mouth 2 (two) times a day for 3 days, Starting Sat 5/11/2019, Until Sat 11/16/2019, Print      EPINEPHrine (EPIPEN) 0 3 mg/0 3 mL SOAJ Inject 0 3 mL into the shoulder, thigh, or buttocks once for 1 dose If needed for allergic reaction    Proceed immediately to ED if you use the EpiPen , Starting Mon 8/21/2017, Print      etanercept (ENBREL) 50 mg/mL SOSY Inject under the skin once a week, Historical Med      HYDROcodone-acetaminophen (NORCO) 5-325 mg per tablet Take 1 tablet by mouth every 6 (six) hours as needed (Not relieved by Anti-inflammatory) for up to 12 dosesMax Daily Amount: 4 tablets, Starting Fri 9/6/2019, Print      hydrOXYzine HCL (ATARAX) 25 mg tablet Take 25 mg by mouth 3 (three) times a day, Historical Med      ibuprofen (MOTRIN) 400 mg tablet Take 1 tablet (400 mg total) by mouth every 6 (six) hours as needed for mild pain for up to 3 days, Starting Sat 5/11/2019, Until Sat 11/16/2019, Print      LORazepam (ATIVAN) 1 mg tablet Take 1 tablet (1 mg total) by mouth 3 (three) times a day as needed for anxiety for up to 5 days, Starting Sat 12/8/2018, Until Sat 11/16/2019, Print      Magnesium Hydroxide (MAGNESIA PO) Take by mouth, Historical Med      Melatonin 10 MG CAPS TAKE 1 CAPSULE BY MOUTH EVERYDAY AT BEDTIME, Historical Med      methylPREDNISolone (MEDROL) 4 MG tablet therapy pack Use as directed on package, Print      !! naproxen (NAPROSYN) 500 mg tablet Take 500 mg by mouth, Starting Mon 9/16/2019, Historical Med      !! naproxen (NAPROSYN) 500 mg tablet Take 1 tablet (500 mg total) by mouth 2 (two) times a day as needed (pain), Starting Tue 12/3/2019, Print      oxyCODONE-acetaminophen (PERCOCET) 5-325 mg per tablet Take 1 tablet by mouth every 8 (eight) hours as needed for moderate painMax Daily Amount: 3 tablets, Starting Mon 2/24/2020, Normal       !! - Potential duplicate medications found  Please discuss with provider  Outpatient Discharge Orders   VAS upper limb venous duplex scan, unilateral/limited   Standing Status: Future Standing Exp   Date: 07/13/24       PDMP Review     None          ED Provider  Electronically Signed by           Feliciano Do MD  07/13/20 2985

## 2020-10-02 ENCOUNTER — HOSPITAL ENCOUNTER (EMERGENCY)
Facility: HOSPITAL | Age: 35
Discharge: HOME | End: 2020-10-02
Attending: EMERGENCY MEDICINE | Admitting: EMERGENCY MEDICINE
Payer: COMMERCIAL

## 2020-10-02 VITALS
SYSTOLIC BLOOD PRESSURE: 141 MMHG | OXYGEN SATURATION: 100 % | HEART RATE: 88 BPM | DIASTOLIC BLOOD PRESSURE: 72 MMHG | RESPIRATION RATE: 20 BRPM | TEMPERATURE: 98.3 F

## 2020-10-02 DIAGNOSIS — M79.10 MYALGIA: Primary | ICD-10-CM

## 2020-10-02 LAB
ALBUMIN SERPL BCP-MCNC: 3.6 G/DL (ref 3.5–5)
ALP SERPL-CCNC: 70 U/L (ref 46–116)
ALT SERPL W P-5'-P-CCNC: 27 U/L (ref 12–78)
ANION GAP SERPL CALCULATED.3IONS-SCNC: 9 MMOL/L (ref 4–13)
AST SERPL W P-5'-P-CCNC: 16 U/L (ref 5–45)
ATRIAL RATE: 91 BPM
BASOPHILS # BLD AUTO: 0.05 THOUSANDS/ΜL (ref 0–0.1)
BASOPHILS NFR BLD AUTO: 0 % (ref 0–1)
BILIRUB SERPL-MCNC: 0.6 MG/DL (ref 0.2–1)
BUN SERPL-MCNC: 13 MG/DL (ref 5–25)
CALCIUM SERPL-MCNC: 8.4 MG/DL (ref 8.3–10.1)
CHLORIDE SERPL-SCNC: 106 MMOL/L (ref 100–108)
CO2 SERPL-SCNC: 27 MMOL/L (ref 21–32)
CREAT SERPL-MCNC: 0.72 MG/DL (ref 0.6–1.3)
EOSINOPHIL # BLD AUTO: 0.11 THOUSAND/ΜL (ref 0–0.61)
EOSINOPHIL NFR BLD AUTO: 1 % (ref 0–6)
ERYTHROCYTE [DISTWIDTH] IN BLOOD BY AUTOMATED COUNT: 14.5 % (ref 11.6–15.1)
GFR SERPL CREATININE-BSD FRML MDRD: 107 ML/MIN/1.73SQ M
GLUCOSE SERPL-MCNC: 93 MG/DL (ref 65–140)
HCT VFR BLD AUTO: 38.5 % (ref 34.8–46.1)
HGB BLD-MCNC: 12.4 G/DL (ref 11.5–15.4)
IMM GRANULOCYTES # BLD AUTO: 0.05 THOUSAND/UL (ref 0–0.2)
IMM GRANULOCYTES NFR BLD AUTO: 0 % (ref 0–2)
LYMPHOCYTES # BLD AUTO: 2.24 THOUSANDS/ΜL (ref 0.6–4.47)
LYMPHOCYTES NFR BLD AUTO: 18 % (ref 14–44)
MCH RBC QN AUTO: 27 PG (ref 26.8–34.3)
MCHC RBC AUTO-ENTMCNC: 32.2 G/DL (ref 31.4–37.4)
MCV RBC AUTO: 84 FL (ref 82–98)
MONOCYTES # BLD AUTO: 1.22 THOUSAND/ΜL (ref 0.17–1.22)
MONOCYTES NFR BLD AUTO: 10 % (ref 4–12)
NEUTROPHILS # BLD AUTO: 8.7 THOUSANDS/ΜL (ref 1.85–7.62)
NEUTS SEG NFR BLD AUTO: 71 % (ref 43–75)
NRBC BLD AUTO-RTO: 0 /100 WBCS
P AXIS: 78 DEGREES
PLATELET # BLD AUTO: 298 THOUSANDS/UL (ref 149–390)
PMV BLD AUTO: 10.8 FL (ref 8.9–12.7)
POTASSIUM SERPL-SCNC: 3.7 MMOL/L (ref 3.5–5.3)
PR INTERVAL: 134 MS
PROT SERPL-MCNC: 7.4 G/DL (ref 6.4–8.2)
QRS AXIS: 74 DEGREES
QRSD INTERVAL: 96 MS
QT INTERVAL: 352 MS
QTC INTERVAL: 432 MS
RBC # BLD AUTO: 4.59 MILLION/UL (ref 3.81–5.12)
SODIUM SERPL-SCNC: 142 MMOL/L (ref 136–145)
T WAVE AXIS: 53 DEGREES
VENTRICULAR RATE: 91 BPM
WBC # BLD AUTO: 12.37 THOUSAND/UL (ref 4.31–10.16)

## 2020-10-02 PROCEDURE — 80053 COMPREHEN METABOLIC PANEL: CPT | Performed by: EMERGENCY MEDICINE

## 2020-10-02 PROCEDURE — 85025 COMPLETE CBC W/AUTO DIFF WBC: CPT | Performed by: EMERGENCY MEDICINE

## 2020-10-02 PROCEDURE — 96372 THER/PROPH/DIAG INJ SC/IM: CPT

## 2020-10-02 PROCEDURE — 96365 THER/PROPH/DIAG IV INF INIT: CPT

## 2020-10-02 PROCEDURE — 36415 COLL VENOUS BLD VENIPUNCTURE: CPT | Performed by: EMERGENCY MEDICINE

## 2020-10-02 PROCEDURE — 93010 ELECTROCARDIOGRAM REPORT: CPT | Performed by: INTERNAL MEDICINE

## 2020-10-02 PROCEDURE — 99285 EMERGENCY DEPT VISIT HI MDM: CPT | Performed by: EMERGENCY MEDICINE

## 2020-10-02 PROCEDURE — 93005 ELECTROCARDIOGRAM TRACING: CPT

## 2020-10-02 PROCEDURE — 96375 TX/PRO/DX INJ NEW DRUG ADDON: CPT

## 2020-10-02 PROCEDURE — 99284 EMERGENCY DEPT VISIT MOD MDM: CPT

## 2020-10-02 RX ORDER — IBUPROFEN 800 MG/1
800 TABLET ORAL 3 TIMES DAILY
Qty: 21 TABLET | Refills: 0 | Status: SHIPPED | OUTPATIENT
Start: 2020-10-02 | End: 2021-07-14 | Stop reason: SDUPTHER

## 2020-10-02 RX ORDER — KETOROLAC TROMETHAMINE 30 MG/ML
15 INJECTION, SOLUTION INTRAMUSCULAR; INTRAVENOUS ONCE
Status: COMPLETED | OUTPATIENT
Start: 2020-10-02 | End: 2020-10-02

## 2020-10-02 RX ORDER — MAGNESIUM SULFATE HEPTAHYDRATE 40 MG/ML
2 INJECTION, SOLUTION INTRAVENOUS ONCE
Status: COMPLETED | OUTPATIENT
Start: 2020-10-02 | End: 2020-10-02

## 2020-10-02 RX ORDER — METOCLOPRAMIDE HYDROCHLORIDE 5 MG/ML
10 INJECTION INTRAMUSCULAR; INTRAVENOUS ONCE
Status: COMPLETED | OUTPATIENT
Start: 2020-10-02 | End: 2020-10-02

## 2020-10-02 RX ORDER — DIPHENHYDRAMINE HYDROCHLORIDE 50 MG/ML
25 INJECTION INTRAMUSCULAR; INTRAVENOUS ONCE
Status: COMPLETED | OUTPATIENT
Start: 2020-10-02 | End: 2020-10-02

## 2020-10-02 RX ADMIN — SODIUM CHLORIDE 1000 ML: 0.9 INJECTION, SOLUTION INTRAVENOUS at 14:33

## 2020-10-02 RX ADMIN — KETOROLAC TROMETHAMINE 15 MG: 30 INJECTION, SOLUTION INTRAMUSCULAR at 14:30

## 2020-10-02 RX ADMIN — METOCLOPRAMIDE HYDROCHLORIDE 10 MG: 5 INJECTION INTRAMUSCULAR; INTRAVENOUS at 14:30

## 2020-10-02 RX ADMIN — DIPHENHYDRAMINE HYDROCHLORIDE 25 MG: 50 INJECTION, SOLUTION INTRAMUSCULAR; INTRAVENOUS at 14:29

## 2020-10-02 RX ADMIN — MAGNESIUM SULFATE IN WATER 2 G: 40 INJECTION, SOLUTION INTRAVENOUS at 14:34

## 2021-03-15 ENCOUNTER — HOSPITAL ENCOUNTER (EMERGENCY)
Facility: HOSPITAL | Age: 36
Discharge: HOME | End: 2021-03-15
Attending: EMERGENCY MEDICINE | Admitting: EMERGENCY MEDICINE
Payer: COMMERCIAL

## 2021-03-15 VITALS
HEART RATE: 91 BPM | SYSTOLIC BLOOD PRESSURE: 133 MMHG | RESPIRATION RATE: 18 BRPM | OXYGEN SATURATION: 99 % | TEMPERATURE: 97.9 F | DIASTOLIC BLOOD PRESSURE: 82 MMHG | BODY MASS INDEX: 21.08 KG/M2 | WEIGHT: 119 LBS

## 2021-03-15 DIAGNOSIS — M79.10 MYALGIA: ICD-10-CM

## 2021-03-15 DIAGNOSIS — M06.9 RHEUMATOID ARTHRITIS FLARE (HCC): Primary | ICD-10-CM

## 2021-03-15 PROCEDURE — 99284 EMERGENCY DEPT VISIT MOD MDM: CPT | Performed by: PHYSICIAN ASSISTANT

## 2021-03-15 PROCEDURE — 99283 EMERGENCY DEPT VISIT LOW MDM: CPT

## 2021-03-15 PROCEDURE — 96372 THER/PROPH/DIAG INJ SC/IM: CPT

## 2021-03-15 RX ORDER — PREDNISONE 10 MG/1
10 TABLET ORAL ONCE
Status: COMPLETED | OUTPATIENT
Start: 2021-03-15 | End: 2021-03-15

## 2021-03-15 RX ORDER — PREDNISONE 20 MG/1
20 TABLET ORAL 2 TIMES DAILY WITH MEALS
Qty: 10 TABLET | Refills: 0 | Status: SHIPPED | OUTPATIENT
Start: 2021-03-15 | End: 2021-03-20

## 2021-03-15 RX ORDER — KETOROLAC TROMETHAMINE 30 MG/ML
15 INJECTION, SOLUTION INTRAMUSCULAR; INTRAVENOUS ONCE
Status: COMPLETED | OUTPATIENT
Start: 2021-03-15 | End: 2021-03-15

## 2021-03-15 RX ADMIN — KETOROLAC TROMETHAMINE 15 MG: 30 INJECTION, SOLUTION INTRAMUSCULAR; INTRAVENOUS at 13:23

## 2021-03-15 RX ADMIN — PREDNISONE 10 MG: 10 TABLET ORAL at 13:23

## 2021-03-15 NOTE — DISCHARGE INSTRUCTIONS
Please take the prednisone as prescribed  Follow-up with your family doctor as well as the rheumatologist for further evaluation and management of the rheumatoid arthritis  Please return to the emergency department if you develop any new or worsening symptoms, especially any chest pain, shortness of breath, high fevers, chills, increasing pain, headaches, dizziness, weakness

## 2021-03-15 NOTE — ED PROVIDER NOTES
History  Chief Complaint   Patient presents with    Generalized Body Aches     pt c/o generalized body aches since her COVID vaccine  has fibromyalgia and rheumatoid arthritis     Patient is a 75-year-old female with a past medical history significant for rheumatoid arthritis and fibromyalgia who presents to the emergency department complaining of generalized body aches as well as pain in her hands and knees for 2 weeks  She states that she got her COVID vaccine 4 days ago and that is when the pain got worse  She has not taken any medication for the pain because she does not know will interfere with her COVID vaccine  She typically gets an injection for her rheumatoid arthritis but has been off of it for 2 weeks because her doctor said she could not be on the medication and get the COVID vaccine  The pain is in her shoulders, hands, fingers, knees, back  She is denying any fevers, chills, chest pain, shortness of breath, abdominal pain, nausea, vomiting, diarrhea, sore throat, cough, congestion, headaches, dizziness, weakness  She is denying all other symptoms at this time  Prior to Admission Medications   Prescriptions Last Dose Informant Patient Reported? Taking? EPINEPHrine (EPIPEN) 0 3 mg/0 3 mL SOAJ   No No   Sig: Inject 0 3 mL into the shoulder, thigh, or buttocks once for 1 dose If needed for allergic reaction  Proceed immediately to ED if you use the EpiPen     HYDROcodone-acetaminophen (NORCO) 5-325 mg per tablet   No No   Sig: Take 1 tablet by mouth every 6 (six) hours as needed (Not relieved by Anti-inflammatory) for up to 12 dosesMax Daily Amount: 4 tablets   Patient not taking: Reported on 10/2/2020   LORazepam (ATIVAN) 1 mg tablet   No No   Sig: Take 1 tablet (1 mg total) by mouth 3 (three) times a day as needed for anxiety for up to 5 days   Magnesium Hydroxide (MAGNESIA PO)   Yes No   Sig: Take by mouth   Melatonin 10 MG CAPS   Yes No   Sig: TAKE 1 CAPSULE BY MOUTH EVERYDAY AT BEDTIME   diclofenac sodium (VOLTAREN) 1 %   No No   Sig: Apply 2 g topically 4 (four) times a day as needed (pain)   Patient not taking: Reported on 10/2/2020   dicyclomine (BENTYL) 20 mg tablet   No No   Sig: Take 1 tablet (20 mg total) by mouth 2 (two) times a day for 3 days   etanercept (ENBREL) 50 mg/mL SOSY   Yes No   Sig: Inject under the skin once a week   hydrOXYzine HCL (ATARAX) 25 mg tablet   Yes No   Sig: Take 25 mg by mouth 3 (three) times a day   ibuprofen (MOTRIN) 800 mg tablet   No No   Sig: Take 1 tablet (800 mg total) by mouth 3 (three) times a day   magnesium oxide (MAG-OX) 400 mg   No No   Sig: Take 1 tablet (400 mg total) by mouth 2 (two) times a day for 14 days   methylPREDNISolone (MEDROL) 4 MG tablet therapy pack   No No   Sig: Use as directed on package   Patient not taking: Reported on 11/16/2019   naproxen (NAPROSYN) 500 mg tablet   Yes No   Sig: Take 500 mg by mouth   naproxen (NAPROSYN) 500 mg tablet   No No   Sig: Take 1 tablet (500 mg total) by mouth 2 (two) times a day as needed (pain)   Patient not taking: Reported on 10/2/2020   oxyCODONE-acetaminophen (PERCOCET) 5-325 mg per tablet   No No   Sig: Take 1 tablet by mouth every 8 (eight) hours as needed for moderate painMax Daily Amount: 3 tablets   Patient not taking: Reported on 10/2/2020   predniSONE 20 mg tablet   No No   Sig: Take 2 tablets (40 mg total) by mouth daily   Patient not taking: Reported on 10/2/2020      Facility-Administered Medications: None       Past Medical History:   Diagnosis Date    Anxiety     Arthritis     Asthma     Fibromyalgia     Panic attack     Rheumatoid arthritis ([facility] [address] )        Past Surgical History:   Procedure Laterality Date    CARPAL TUNNEL RELEASE Right     TUBAL LIGATION      TUBAL LIGATION         History reviewed  No pertinent family history  I have reviewed and agree with the history as documented      E-Cigarette/Vaping    E-Cigarette Use Never User      E-Cigarette/Vaping Substances     Social History     Tobacco Use    Smoking status: Never Smoker    Smokeless tobacco: Never Used   Substance Use Topics    Alcohol use: No    Drug use: No       Review of Systems   Constitutional: Negative for chills and fever  HENT: Negative for congestion and sore throat  Respiratory: Negative for cough, chest tightness and shortness of breath  Cardiovascular: Negative for chest pain, palpitations and leg swelling  Gastrointestinal: Negative for abdominal pain, diarrhea, nausea and vomiting  Musculoskeletal: Positive for arthralgias (knees), joint swelling (fingers) and myalgias (diffuse)  Skin: Negative for color change and rash  Neurological: Negative for dizziness, syncope, weakness, numbness and headaches  Physical Exam  Physical Exam  Vitals signs reviewed  Constitutional:       General: She is in acute distress (secondary to pain)  Appearance: Normal appearance  She is not ill-appearing or toxic-appearing  HENT:      Head: Normocephalic and atraumatic  Right Ear: External ear normal       Left Ear: External ear normal    Eyes:      General:         Right eye: No discharge  Left eye: No discharge  Extraocular Movements: Extraocular movements intact  Conjunctiva/sclera: Conjunctivae normal    Neck:      Musculoskeletal: Normal range of motion and neck supple  Cardiovascular:      Rate and Rhythm: Normal rate and regular rhythm  Heart sounds: Normal heart sounds  No murmur  No friction rub  No gallop  Pulmonary:      Effort: Pulmonary effort is normal       Breath sounds: Normal breath sounds  No wheezing, rhonchi or rales  Abdominal:      General: Abdomen is flat  Palpations: Abdomen is soft  Tenderness: There is no abdominal tenderness  There is no guarding or rebound  Musculoskeletal:         General: Swelling (bilateral PIP joints) and tenderness (bilateral knees, hands, shoulders) present   No deformity or signs of injury  Skin:     General: Skin is warm and dry  Capillary Refill: Capillary refill takes less than 2 seconds  Neurological:      General: No focal deficit present  Mental Status: She is alert and oriented to person, place, and time  Psychiatric:         Mood and Affect: Mood normal          Behavior: Behavior normal          Vital Signs  ED Triage Vitals [03/15/21 1226]   Temperature Pulse Respirations Blood Pressure SpO2   97 9 °F (36 6 °C) 91 18 133/82 99 %      Temp Source Heart Rate Source Patient Position - Orthostatic VS BP Location FiO2 (%)   Oral Monitor -- -- --      Pain Score       8           Vitals:    03/15/21 1226   BP: 133/82   Pulse: 91         Visual Acuity      ED Medications  Medications   ketorolac (TORADOL) injection 15 mg (15 mg Intramuscular Given 3/15/21 1323)   predniSONE tablet 10 mg (10 mg Oral Given 3/15/21 1323)       Diagnostic Studies  Results Reviewed     None                 No orders to display              Procedures  Procedures         ED Course                                           MDM  Number of Diagnoses or Management Options  Myalgia:   Rheumatoid arthritis flare [facility]):   Diagnosis management comments: Patient presented today with 2 weeks of generalized body aches that got worse about 4 days ago after her COVID vaccine  She had a history of rheumatoid arthritis as well as fibromyalgia  She usually gets an injection for her rheumatoid arthritis but has not been able to take it due to getting her COVID vaccine  She was given a prednisone tablet as well as Toradol here in the ED with some relief  She was sent home with a course of steroids  She was instructed to follow-up with her PCP as well as her rheumatologist   She was given strict instructions on when to return to the ED      Patient Progress  Patient progress: stable      Disposition  Final diagnoses:   Myalgia   Rheumatoid arthritis flare ([facility] [address] )     Time reflects when diagnosis was documented in both MDM as applicable and the Disposition within this note     Time User Action Codes Description Comment    3/15/2021  1:30 PM Jarrod Smiling Add [P13 17] Myalgia     3/15/2021  1:30 PM Jarrod Smiling Add [M06 9] Rheumatoid arthritis flare ([address] )     3/15/2021  1:30 PM Jarrod Smiling Modify [F18 32] Myalgia     3/15/2021  1:30 PM Jarrod Smiling Modify [M06 9] Rheumatoid arthritis flare [facility])       ED Disposition     ED Disposition Condition Date/Time Comment    Discharge Stable Mon Mar 15, 2021  1:30 PM Thresegabe Poor discharge to home/self care  Follow-up Information     Follow up With Specialties Details Why Contact Info Additional Information    Erwin Ta MD Internal Medicine Schedule an appointment as soon as possible for a visit  for follow up [address] [place] [address] Angelic Munroe DO Rheumatology, Orthopedics Schedule an appointment as soon as possible for a visit  for further evaluation and management of your RA Alannah Lenz   [address] [facility] Emergency Department Emergency Medicine  If symptoms worsen [address] Emergency Department, [address], [address] [place] [address], [facility]          Patient's Medications   Discharge Prescriptions    PREDNISONE 20 MG TABLET    Take 1 tablet (20 mg total) by mouth 2 (two) times a day with meals for 5 days       Start Date: 3/15/2021 End Date: 3/20/2021       Order Dose: 20 mg       Quantity: 10 tablet    Refills: 0     No discharge procedures on file      PDMP Review     None          ED Provider  Electronically Signed by           Greg Matson PA-C  03/15/21 6509

## 2021-06-06 ENCOUNTER — HOSPITAL ENCOUNTER (EMERGENCY)
Facility: HOSPITAL | Age: 36
Discharge: HOME | End: 2021-06-06
Attending: EMERGENCY MEDICINE
Payer: COMMERCIAL

## 2021-06-06 VITALS
SYSTOLIC BLOOD PRESSURE: 119 MMHG | DIASTOLIC BLOOD PRESSURE: 77 MMHG | BODY MASS INDEX: 21.09 KG/M2 | HEIGHT: 63 IN | TEMPERATURE: 97.7 F | WEIGHT: 119 LBS | HEART RATE: 95 BPM | OXYGEN SATURATION: 100 % | RESPIRATION RATE: 18 BRPM

## 2021-06-06 DIAGNOSIS — M79.10 MYALGIA: ICD-10-CM

## 2021-06-06 DIAGNOSIS — M25.50 ARTHRALGIA: Primary | ICD-10-CM

## 2021-06-06 PROCEDURE — 96372 THER/PROPH/DIAG INJ SC/IM: CPT

## 2021-06-06 PROCEDURE — 99283 EMERGENCY DEPT VISIT LOW MDM: CPT

## 2021-06-06 PROCEDURE — 99284 EMERGENCY DEPT VISIT MOD MDM: CPT | Performed by: PHYSICIAN ASSISTANT

## 2021-06-06 RX ORDER — KETOROLAC TROMETHAMINE 30 MG/ML
30 INJECTION, SOLUTION INTRAMUSCULAR; INTRAVENOUS ONCE
Status: COMPLETED | OUTPATIENT
Start: 2021-06-06 | End: 2021-06-06

## 2021-06-06 RX ORDER — METHOCARBAMOL 500 MG/1
500 TABLET, FILM COATED ORAL 3 TIMES DAILY PRN
Qty: 21 TABLET | Refills: 0 | Status: SHIPPED | OUTPATIENT
Start: 2021-06-06 | End: 2021-07-14 | Stop reason: SDUPTHER

## 2021-06-06 RX ORDER — METHOCARBAMOL 500 MG/1
750 TABLET, FILM COATED ORAL ONCE
Status: COMPLETED | OUTPATIENT
Start: 2021-06-06 | End: 2021-06-06

## 2021-06-06 RX ORDER — ACETAMINOPHEN 325 MG/1
975 TABLET ORAL ONCE
Status: COMPLETED | OUTPATIENT
Start: 2021-06-06 | End: 2021-06-06

## 2021-06-06 RX ADMIN — ACETAMINOPHEN 975 MG: 325 TABLET, FILM COATED ORAL at 09:16

## 2021-06-06 RX ADMIN — METHOCARBAMOL 750 MG: 500 TABLET ORAL at 09:16

## 2021-06-06 RX ADMIN — KETOROLAC TROMETHAMINE 30 MG: 30 INJECTION, SOLUTION INTRAMUSCULAR; INTRAVENOUS at 09:16

## 2021-06-06 NOTE — ED PROVIDER NOTES
History  Chief Complaint   Patient presents with    Pain     woke up this am with a "fibromyalgia attack" also hx of RA      42yo female with a history of rheumatoid arthritis on Enbrel, fibromyalgia, and anxiety presenting with her  for evaluation of arthralgias for the past several days  Patient complains of diffuse body pain in her bilateral shoulders, low back, bilateral knees, hands, and neck  She states that her pain is muscular and in the skeleton    Patient is unsure if her symptoms are related to her underlying rheumatoid arthritis or fibromyalgia  Symptoms have been worsening over the past several days  She denies any known trauma or falls  Patient reports similar pains in the past  She has prednisone 20mg at home which she was reportedly told to take at home during her pain flares  Patient took 20mg yesterday without much improvement  She has not tried anything else OTC for her symptoms  Patient is now having some difficulty walking due to her symptoms which prompted her to seek medical attention  No fevers, chills, chest pain, shortness of breath, rashes  Patient has an appointment with her rheumatologist scheduled for tomorrow  History provided by:  Patient   used: No    Medical Problem  Location:  Diffuse joint  Quality:  Pain, stiffness  Severity:  Moderate  Onset quality:  Gradual  Timing:  Constant  Progression:  Worsening  Chronicity:  Recurrent  Context:  Hx of fibromyalgia and rheumatoid arthritis  Symptoms are typical for her flares  Relieved by:  Nothing  Worsened by: Movement  Ineffective treatments:  Prednisone  Associated symptoms: myalgias    Associated symptoms: no abdominal pain, no chest pain, no diarrhea, no fatigue, no fever, no loss of consciousness, no nausea, no rash, no shortness of breath, no sore throat and no vomiting        Prior to Admission Medications   Prescriptions Last Dose Informant Patient Reported? Taking?    EPINEPHrine (EPIPEN) 0 3 mg/0 3 mL SOAJ   No No   Sig: Inject 0 3 mL into the shoulder, thigh, or buttocks once for 1 dose If needed for allergic reaction  Proceed immediately to ED if you use the EpiPen     HYDROcodone-acetaminophen (NORCO) 5-325 mg per tablet   No No   Sig: Take 1 tablet by mouth every 6 (six) hours as needed (Not relieved by Anti-inflammatory) for up to 12 dosesMax Daily Amount: 4 tablets   Patient not taking: Reported on 10/2/2020   LORazepam (ATIVAN) 1 mg tablet   No No   Sig: Take 1 tablet (1 mg total) by mouth 3 (three) times a day as needed for anxiety for up to 5 days   Magnesium Hydroxide (MAGNESIA PO)   Yes No   Sig: Take by mouth   Melatonin 10 MG CAPS   Yes No   Sig: TAKE 1 CAPSULE BY MOUTH EVERYDAY AT BEDTIME   diclofenac sodium (VOLTAREN) 1 %   No No   Sig: Apply 2 g topically 4 (four) times a day as needed (pain)   Patient not taking: Reported on 10/2/2020   dicyclomine (BENTYL) 20 mg tablet   No No   Sig: Take 1 tablet (20 mg total) by mouth 2 (two) times a day for 3 days   etanercept (ENBREL) 50 mg/mL SOSY   Yes No   Sig: Inject under the skin once a week   hydrOXYzine HCL (ATARAX) 25 mg tablet   Yes No   Sig: Take 25 mg by mouth 3 (three) times a day   ibuprofen (MOTRIN) 800 mg tablet   No No   Sig: Take 1 tablet (800 mg total) by mouth 3 (three) times a day   magnesium oxide (MAG-OX) 400 mg   No No   Sig: Take 1 tablet (400 mg total) by mouth 2 (two) times a day for 14 days   methylPREDNISolone (MEDROL) 4 MG tablet therapy pack   No No   Sig: Use as directed on package   Patient not taking: Reported on 11/16/2019   naproxen (NAPROSYN) 500 mg tablet   Yes No   Sig: Take 500 mg by mouth   naproxen (NAPROSYN) 500 mg tablet   No No   Sig: Take 1 tablet (500 mg total) by mouth 2 (two) times a day as needed (pain)   Patient not taking: Reported on 10/2/2020   oxyCODONE-acetaminophen (PERCOCET) 5-325 mg per tablet   No No   Sig: Take 1 tablet by mouth every 8 (eight) hours as needed for moderate painMax Daily Amount: 3 tablets   Patient not taking: Reported on 10/2/2020   predniSONE 20 mg tablet   No No   Sig: Take 2 tablets (40 mg total) by mouth daily   Patient not taking: Reported on 10/2/2020      Facility-Administered Medications: None       Past Medical History:   Diagnosis Date    Anxiety     Arthritis     Asthma     Fibromyalgia     Panic attack     Rheumatoid arthritis ([facility] [address] )        Past Surgical History:   Procedure Laterality Date    CARPAL TUNNEL RELEASE Right     TUBAL LIGATION      TUBAL LIGATION         No family history on file  I have reviewed and agree with the history as documented  E-Cigarette/Vaping    E-Cigarette Use Never User      E-Cigarette/Vaping Substances     Social History     Tobacco Use    Smoking status: Never Smoker    Smokeless tobacco: Never Used   Substance Use Topics    Alcohol use: No    Drug use: No       Review of Systems   Constitutional: Negative for chills, fatigue and fever  HENT: Negative for drooling, sore throat and voice change  Eyes: Negative for discharge and redness  Respiratory: Negative for shortness of breath and stridor  Cardiovascular: Negative for chest pain and leg swelling  Gastrointestinal: Negative for abdominal pain, diarrhea, nausea and vomiting  Musculoskeletal: Positive for arthralgias and myalgias  Negative for neck pain and neck stiffness  Skin: Negative for color change and rash  Neurological: Negative for seizures, loss of consciousness and syncope  Psychiatric/Behavioral: Negative for confusion  The patient is nervous/anxious  All other systems reviewed and are negative  Physical Exam  Physical Exam  Vitals signs and nursing note reviewed  Constitutional:       General: She is not in acute distress  Appearance: She is well-developed  She is not diaphoretic  Comments: Patient tearful, non-toxic   HENT:      Head: Normocephalic and atraumatic        Right Ear: External ear normal       Left Ear: External ear normal       Nose: Nose normal    Eyes:      General: No scleral icterus  Right eye: No discharge  Left eye: No discharge  Conjunctiva/sclera: Conjunctivae normal    Neck:      Musculoskeletal: Normal range of motion and neck supple  Cardiovascular:      Rate and Rhythm: Normal rate and regular rhythm  Heart sounds: Normal heart sounds  No murmur  Pulmonary:      Effort: Pulmonary effort is normal  No respiratory distress  Breath sounds: Normal breath sounds  No stridor  No wheezing or rales  Abdominal:      General: Bowel sounds are normal  There is no distension  Palpations: Abdomen is soft  Tenderness: There is no abdominal tenderness  There is no guarding  Musculoskeletal: Normal range of motion  General: No deformity  Comments: Tenderness on palpation of large joints diffusely  No swelling or skin changes noted  Lymphadenopathy:      Cervical: No cervical adenopathy  Skin:     General: Skin is warm and dry  Neurological:      Mental Status: She is alert  She is not disoriented  GCS: GCS eye subscore is 4  GCS verbal subscore is 5  GCS motor subscore is 6     Psychiatric:         Behavior: Behavior normal          Vital Signs  ED Triage Vitals [06/06/21 0851]   Temperature Pulse Respirations Blood Pressure SpO2   97 7 °F (36 5 °C) 95 18 119/77 100 %      Temp Source Heart Rate Source Patient Position - Orthostatic VS BP Location FiO2 (%)   Oral Monitor Lying Right arm --      Pain Score       Worst Possible Pain           Vitals:    06/06/21 0851   BP: 119/77   Pulse: 95   Patient Position - Orthostatic VS: Lying         Visual Acuity      ED Medications  Medications   acetaminophen (TYLENOL) tablet 975 mg (975 mg Oral Given 6/6/21 0916)   ketorolac (TORADOL) injection 30 mg (30 mg Intramuscular Given 6/6/21 0916)   methocarbamol (ROBAXIN) tablet 750 mg (750 mg Oral Given 6/6/21 0916)       Diagnostic Studies  Results Reviewed     None                 No orders to display              Procedures  Procedures         ED Course                       MDM  Number of Diagnoses or Management Options  Arthralgia: new and does not require workup  Myalgia: new and does not require workup  Diagnosis management comments: 25-year-old female with history of rheumatoid arthritis and fibromyalgia presenting for diffuse arthralgias and myalgias for the past few days  She is unsure if she is having a fibromyalgia attack or if symptoms are related to her underlying RA  She took a dose of prednisone 20 mg once yesterday without improvement  She has not tried anything else over-the-counter  No new symptoms from her typical RA flares  No fevers or chills  Patient is afebrile and hemodynamically stable  There is no joint swelling, erythema, or rashes on exam   Will treat symptomatically with IM Toradol, Tylenol, and Robaxin  Script provided for Robaxin  She was advised to take her home prednisone 40mg today and tomorrow and f/u with her rheumatologist tomorrow for further instructions  ED return precautions discussed  Patient expressed understanding and is agreeable to plan  Patient discharged in stable condition           Amount and/or Complexity of Data Reviewed  Review and summarize past medical records: yes    Risk of Complications, Morbidity, and/or Mortality  Presenting problems: low  Diagnostic procedures: low  Management options: low    Patient Progress  Patient progress: stable      Disposition  Final diagnoses:   Arthralgia   Myalgia     Time reflects when diagnosis was documented in both MDM as applicable and the Disposition within this note     Time User Action Codes Description Comment    6/6/2021  9:10 AM Nayeli Altamirano Add [M25 50] Arthralgia     6/6/2021  9:13 AM Gagan Limon Add [M79 10] Myalgia       ED Disposition     ED Disposition Condition Date/Time Comment    Discharge Stable [place] Jun 6, 2021  9:10 AM Kash Peraza Joel discharge to home/self care  Follow-up Information     Follow up With Specialties Details Why Contact Info Additional 200 Barbie Dillon MD Internal Medicine Schedule an appointment as soon as possible for a visit   201 [facility] Emergency Department Emergency Medicine  If symptoms worsen 34 [facility] 109 [facility] Emergency Department, [address] [place] [address], [id number]          Discharge Medication List as of 6/6/2021  9:13 AM      START taking these medications    Details   methocarbamol (ROBAXIN) 500 mg tablet Take 1 tablet (500 mg total) by mouth 3 (three) times a day as needed for muscle spasms, Starting Sun 6/6/2021, Normal         CONTINUE these medications which have NOT CHANGED    Details   diclofenac sodium (VOLTAREN) 1 % Apply 2 g topically 4 (four) times a day as needed (pain), Starting Tue 12/3/2019, Print      dicyclomine (BENTYL) 20 mg tablet Take 1 tablet (20 mg total) by mouth 2 (two) times a day for 3 days, Starting Sat 5/11/2019, Until Sat 11/16/2019, Print      EPINEPHrine (EPIPEN) 0 3 mg/0 3 mL SOAJ Inject 0 3 mL into the shoulder, thigh, or buttocks once for 1 dose If needed for allergic reaction    Proceed immediately to ED if you use the EpiPen , Starting Mon 8/21/2017, Print      etanercept (ENBREL) 50 mg/mL SOSY Inject under the skin once a week, Historical Med      HYDROcodone-acetaminophen (NORCO) 5-325 mg per tablet Take 1 tablet by mouth every 6 (six) hours as needed (Not relieved by Anti-inflammatory) for up to 12 dosesMax Daily Amount: 4 tablets, Starting Fri 9/6/2019, Print      hydrOXYzine HCL (ATARAX) 25 mg tablet Take 25 mg by mouth 3 (three) times a day, Historical Med      ibuprofen (MOTRIN) 800 mg tablet Take 1 tablet (800 mg total) by mouth 3 (three) times a day, Starting Fri 10/2/2020, Normal      LORazepam (ATIVAN) 1 mg tablet Take 1 tablet (1 mg total) by mouth 3 (three) times a day as needed for anxiety for up to 5 days, Starting Sat 12/8/2018, Until Sat 11/16/2019, Print      Magnesium Hydroxide (MAGNESIA PO) Take by mouth, Historical Med      magnesium oxide (MAG-OX) 400 mg Take 1 tablet (400 mg total) by mouth 2 (two) times a day for 14 days, Starting Fri 10/2/2020, Until Fri 10/16/2020, Normal      Melatonin 10 MG CAPS TAKE 1 CAPSULE BY MOUTH EVERYDAY AT BEDTIME, Historical Med      methylPREDNISolone (MEDROL) 4 MG tablet therapy pack Use as directed on package, Print      !! naproxen (NAPROSYN) 500 mg tablet Take 500 mg by mouth, Starting Mon 9/16/2019, Historical Med      !! naproxen (NAPROSYN) 500 mg tablet Take 1 tablet (500 mg total) by mouth 2 (two) times a day as needed (pain), Starting Tue 12/3/2019, Print      oxyCODONE-acetaminophen (PERCOCET) 5-325 mg per tablet Take 1 tablet by mouth every 8 (eight) hours as needed for moderate painMax Daily Amount: 3 tablets, Starting Mon 2/24/2020, Normal      predniSONE 20 mg tablet Take 2 tablets (40 mg total) by mouth daily, Starting Mon 7/13/2020, Print       !! - Potential duplicate medications found  Please discuss with provider  No discharge procedures on file      PDMP Review     None          ED Provider  Electronically Signed by           Edilia Little PA-C  06/06/21 6111

## 2021-06-06 NOTE — DISCHARGE INSTRUCTIONS
Take prednisone 40mg today and tomorrow until you are seen by your rheumatologist  Take Tylenol 650mg and ibuprofen 600mg every 6 hours as needed for pain  Take muscle relaxer as needed for spasms/breakthrough pain  Please follow-up with your rheumatologist tomorrow  Return to the ER with any worsening symptoms

## 2021-07-14 ENCOUNTER — HOSPITAL ENCOUNTER (EMERGENCY)
Facility: HOSPITAL | Age: 36
Discharge: HOME | End: 2021-07-14
Attending: EMERGENCY MEDICINE
Payer: COMMERCIAL

## 2021-07-14 VITALS
WEIGHT: 119 LBS | HEIGHT: 63 IN | OXYGEN SATURATION: 100 % | BODY MASS INDEX: 21.09 KG/M2 | DIASTOLIC BLOOD PRESSURE: 72 MMHG | HEART RATE: 77 BPM | RESPIRATION RATE: 18 BRPM | SYSTOLIC BLOOD PRESSURE: 104 MMHG | TEMPERATURE: 98.4 F

## 2021-07-14 DIAGNOSIS — M25.50 ARTHRALGIA: ICD-10-CM

## 2021-07-14 DIAGNOSIS — M79.10 MYALGIA: ICD-10-CM

## 2021-07-14 DIAGNOSIS — M79.7 MUSCLE PAIN, FIBROMYALGIA: Primary | ICD-10-CM

## 2021-07-14 LAB
ALBUMIN SERPL BCP-MCNC: 3.7 G/DL (ref 3.5–5)
ALP SERPL-CCNC: 72 U/L (ref 46–116)
ALT SERPL W P-5'-P-CCNC: 17 U/L (ref 12–78)
ANION GAP SERPL CALCULATED.3IONS-SCNC: 12 MMOL/L (ref 4–13)
AST SERPL W P-5'-P-CCNC: 13 U/L (ref 5–45)
BASOPHILS # BLD AUTO: 0.03 THOUSANDS/ΜL (ref 0–0.1)
BASOPHILS NFR BLD AUTO: 0 % (ref 0–1)
BILIRUB SERPL-MCNC: 0.68 MG/DL (ref 0.2–1)
BUN SERPL-MCNC: 14 MG/DL (ref 5–25)
CALCIUM SERPL-MCNC: 8.8 MG/DL (ref 8.3–10.1)
CHLORIDE SERPL-SCNC: 102 MMOL/L (ref 100–108)
CO2 SERPL-SCNC: 25 MMOL/L (ref 21–32)
CREAT SERPL-MCNC: 0.59 MG/DL (ref 0.6–1.3)
EOSINOPHIL # BLD AUTO: 0.2 THOUSAND/ΜL (ref 0–0.61)
EOSINOPHIL NFR BLD AUTO: 2 % (ref 0–6)
ERYTHROCYTE [DISTWIDTH] IN BLOOD BY AUTOMATED COUNT: 13.9 % (ref 11.6–15.1)
GFR SERPL CREATININE-BSD FRML MDRD: 117 ML/MIN/1.73SQ M
GLUCOSE SERPL-MCNC: 86 MG/DL (ref 65–140)
HCT VFR BLD AUTO: 38.5 % (ref 34.8–46.1)
HGB BLD-MCNC: 12.1 G/DL (ref 11.5–15.4)
IMM GRANULOCYTES # BLD AUTO: 0.03 THOUSAND/UL (ref 0–0.2)
IMM GRANULOCYTES NFR BLD AUTO: 0 % (ref 0–2)
LYMPHOCYTES # BLD AUTO: 1.56 THOUSANDS/ΜL (ref 0.6–4.47)
LYMPHOCYTES NFR BLD AUTO: 18 % (ref 14–44)
MCH RBC QN AUTO: 25.3 PG (ref 26.8–34.3)
MCHC RBC AUTO-ENTMCNC: 31.4 G/DL (ref 31.4–37.4)
MCV RBC AUTO: 80 FL (ref 82–98)
MONOCYTES # BLD AUTO: 0.91 THOUSAND/ΜL (ref 0.17–1.22)
MONOCYTES NFR BLD AUTO: 10 % (ref 4–12)
NEUTROPHILS # BLD AUTO: 6.03 THOUSANDS/ΜL (ref 1.85–7.62)
NEUTS SEG NFR BLD AUTO: 70 % (ref 43–75)
NRBC BLD AUTO-RTO: 0 /100 WBCS
PLATELET # BLD AUTO: 383 THOUSANDS/UL (ref 149–390)
PMV BLD AUTO: 10.5 FL (ref 8.9–12.7)
POTASSIUM SERPL-SCNC: 4.4 MMOL/L (ref 3.5–5.3)
PROT SERPL-MCNC: 7.9 G/DL (ref 6.4–8.2)
RBC # BLD AUTO: 4.79 MILLION/UL (ref 3.81–5.12)
SODIUM SERPL-SCNC: 139 MMOL/L (ref 136–145)
TROPONIN I SERPL-MCNC: <0.02 NG/ML
WBC # BLD AUTO: 8.76 THOUSAND/UL (ref 4.31–10.16)

## 2021-07-14 PROCEDURE — 85025 COMPLETE CBC W/AUTO DIFF WBC: CPT | Performed by: EMERGENCY MEDICINE

## 2021-07-14 PROCEDURE — 99283 EMERGENCY DEPT VISIT LOW MDM: CPT

## 2021-07-14 PROCEDURE — 84484 ASSAY OF TROPONIN QUANT: CPT | Performed by: EMERGENCY MEDICINE

## 2021-07-14 PROCEDURE — 99284 EMERGENCY DEPT VISIT MOD MDM: CPT | Performed by: EMERGENCY MEDICINE

## 2021-07-14 PROCEDURE — 36415 COLL VENOUS BLD VENIPUNCTURE: CPT

## 2021-07-14 PROCEDURE — 96372 THER/PROPH/DIAG INJ SC/IM: CPT

## 2021-07-14 PROCEDURE — 80053 COMPREHEN METABOLIC PANEL: CPT | Performed by: EMERGENCY MEDICINE

## 2021-07-14 RX ORDER — KETOROLAC TROMETHAMINE 30 MG/ML
30 INJECTION, SOLUTION INTRAMUSCULAR; INTRAVENOUS ONCE
Status: COMPLETED | OUTPATIENT
Start: 2021-07-14 | End: 2021-07-14

## 2021-07-14 RX ORDER — IBUPROFEN 800 MG/1
800 TABLET ORAL EVERY 8 HOURS PRN
Qty: 21 TABLET | Refills: 0 | Status: SHIPPED | OUTPATIENT
Start: 2021-07-14

## 2021-07-14 RX ORDER — METHOCARBAMOL 500 MG/1
750 TABLET, FILM COATED ORAL ONCE
Status: COMPLETED | OUTPATIENT
Start: 2021-07-14 | End: 2021-07-14

## 2021-07-14 RX ORDER — METHOCARBAMOL 500 MG/1
500 TABLET, FILM COATED ORAL 3 TIMES DAILY PRN
Qty: 21 TABLET | Refills: 0 | Status: SHIPPED | OUTPATIENT
Start: 2021-07-14

## 2021-07-14 RX ADMIN — KETOROLAC TROMETHAMINE 30 MG: 30 INJECTION, SOLUTION INTRAMUSCULAR at 14:05

## 2021-07-14 RX ADMIN — METHOCARBAMOL 750 MG: 500 TABLET ORAL at 14:05

## 2021-07-14 NOTE — Clinical Note
Gunjan Olson was seen and treated in our emergency department on 7/14/2021  Diagnosis:     Krish Mueller  may return to work on return date  She may return on this date: 07/16/2021         If you have any questions or concerns, please don't hesitate to call        Destiny Jasso MD    ______________________________           _______________          _______________  Hospital Representative                              Date                                Time

## 2021-07-14 NOTE — ED PROVIDER NOTES
History  Chief Complaint   Patient presents with    Generalized Body Aches     pt reports generalized body aches x a few days, hx of fibro and RA      This is a 75-year-old female past history significant from fibromyalgia and rheumatoid arthritis who presents emergency department with several days of flare of fibromyalgia  She is also having some right wrist joint pain as well  Patient states she recently was on steroids for 3 weeks and was stopped about 1 week ago  She has been taking 400 mg of Advil at home without significant relief  Also taking Naprosyn intermittently with little relief as well  She does have Robaxin at home has not taken since yesterday  She has not seen her rheumatologist since her last ED visit approximately 1 month ago  No fevers or chills  LMP was June 18th  No chest pain, no shortness of breath  No weakness or numbness  Symptoms are moderate severity  Generalized body aches  Mainly musculoskeletal   No radiation of symptoms  Worse with movement  Better with rest   No other associated symptoms  Prior to Admission Medications   Prescriptions Last Dose Informant Patient Reported? Taking? EPINEPHrine (EPIPEN) 0 3 mg/0 3 mL SOAJ   No No   Sig: Inject 0 3 mL into the shoulder, thigh, or buttocks once for 1 dose If needed for allergic reaction  Proceed immediately to ED if you use the EpiPen     HYDROcodone-acetaminophen (NORCO) 5-325 mg per tablet   No No   Sig: Take 1 tablet by mouth every 6 (six) hours as needed (Not relieved by Anti-inflammatory) for up to 12 dosesMax Daily Amount: 4 tablets   Patient not taking: Reported on 10/2/2020   LORazepam (ATIVAN) 1 mg tablet   No No   Sig: Take 1 tablet (1 mg total) by mouth 3 (three) times a day as needed for anxiety for up to 5 days   Magnesium Hydroxide (MAGNESIA PO)   Yes No   Sig: Take by mouth   Melatonin 10 MG CAPS   Yes No   Sig: TAKE 1 CAPSULE BY MOUTH EVERYDAY AT BEDTIME   diclofenac sodium (VOLTAREN) 1 %   No No Sig: Apply 2 g topically 4 (four) times a day as needed (pain)   Patient not taking: Reported on 10/2/2020   dicyclomine (BENTYL) 20 mg tablet   No No   Sig: Take 1 tablet (20 mg total) by mouth 2 (two) times a day for 3 days   etanercept (ENBREL) 50 mg/mL SOSY   Yes No   Sig: Inject under the skin once a week   hydrOXYzine HCL (ATARAX) 25 mg tablet   Yes No   Sig: Take 25 mg by mouth 3 (three) times a day   ibuprofen (MOTRIN) 800 mg tablet   No No   Sig: Take 1 tablet (800 mg total) by mouth 3 (three) times a day   ibuprofen (MOTRIN) 800 mg tablet   No No   Sig: Take 1 tablet (800 mg total) by mouth every 8 (eight) hours as needed for moderate pain   magnesium oxide (MAG-OX) 400 mg   No No   Sig: Take 1 tablet (400 mg total) by mouth 2 (two) times a day for 14 days   methocarbamol (ROBAXIN) 500 mg tablet   No No   Sig: Take 1 tablet (500 mg total) by mouth 3 (three) times a day as needed for muscle spasms   methocarbamol (ROBAXIN) 500 mg tablet   No No   Sig: Take 1 tablet (500 mg total) by mouth 3 (three) times a day as needed for muscle spasms   methylPREDNISolone (MEDROL) 4 MG tablet therapy pack   No No   Sig: Use as directed on package   Patient not taking: Reported on 11/16/2019   oxyCODONE-acetaminophen (PERCOCET) 5-325 mg per tablet   No No   Sig: Take 1 tablet by mouth every 8 (eight) hours as needed for moderate painMax Daily Amount: 3 tablets   Patient not taking: Reported on 10/2/2020   predniSONE 20 mg tablet   No No   Sig: Take 2 tablets (40 mg total) by mouth daily   Patient not taking: Reported on 10/2/2020      Facility-Administered Medications: None       Past Medical History:   Diagnosis Date    Anxiety     Arthritis     Asthma     Fibromyalgia     Panic attack     Rheumatoid arthritis (HCC)        Past Surgical History:   Procedure Laterality Date    CARPAL TUNNEL RELEASE Right     TUBAL LIGATION      TUBAL LIGATION         History reviewed  No pertinent family history    I have reviewed and agree with the history as documented  E-Cigarette/Vaping    E-Cigarette Use Never User      E-Cigarette/Vaping Substances     Social History     Tobacco Use    Smoking status: Never Smoker    Smokeless tobacco: Never Used   Vaping Use    Vaping Use: Never used   Substance Use Topics    Alcohol use: No    Drug use: No       Review of Systems   Constitutional: Negative for activity change, chills and fever  Eyes: Negative for pain and redness  Respiratory: Negative for cough and shortness of breath  Cardiovascular: Negative for chest pain and palpitations  Gastrointestinal: Negative for abdominal pain, diarrhea, nausea and vomiting  Endocrine: Negative for polyuria  Genitourinary: Negative for difficulty urinating, dysuria, frequency and urgency  Musculoskeletal: Positive for arthralgias, back pain and myalgias  Negative for gait problem and neck stiffness  Skin: Negative for color change and rash  Allergic/Immunologic: Negative for immunocompromised state  Neurological: Negative for dizziness, syncope, weakness and numbness  Hematological: Does not bruise/bleed easily  Psychiatric/Behavioral: Negative for confusion  Physical Exam  Physical Exam  Vitals and nursing note reviewed  Constitutional:       General: She is not in acute distress  Appearance: She is well-developed  HENT:      Head: Normocephalic and atraumatic  Nose: Nose normal    Eyes:      General: No scleral icterus  Conjunctiva/sclera: Conjunctivae normal    Cardiovascular:      Rate and Rhythm: Normal rate and regular rhythm  Heart sounds: Normal heart sounds  Pulmonary:      Effort: Pulmonary effort is normal  No respiratory distress  Breath sounds: Normal breath sounds  No stridor  No wheezing  Abdominal:      General: There is no distension  Palpations: Abdomen is soft  Tenderness: There is no abdominal tenderness  There is no guarding or rebound  Musculoskeletal:         General: Tenderness (diffuse muscular tenderness of back  no spasm  soft to touch) present  No swelling or deformity  Normal range of motion  Cervical back: Normal range of motion and neck supple  Skin:     General: Skin is warm and dry  Findings: No rash  Neurological:      Mental Status: She is alert and oriented to person, place, and time  Psychiatric:         Thought Content:  Thought content normal          Vital Signs  ED Triage Vitals [07/14/21 1142]   Temperature Pulse Respirations Blood Pressure SpO2   98 4 °F (36 9 °C) 77 18 104/72 100 %      Temp Source Heart Rate Source Patient Position - Orthostatic VS BP Location FiO2 (%)   Oral Monitor Sitting Left arm --      Pain Score       --           Vitals:    07/14/21 1142   BP: 104/72   Pulse: 77   Patient Position - Orthostatic VS: Sitting         Visual Acuity      ED Medications  Medications   methocarbamol (ROBAXIN) tablet 750 mg (has no administration in time range)   ketorolac (TORADOL) injection 30 mg (has no administration in time range)       Diagnostic Studies  Results Reviewed     Procedure Component Value Units Date/Time    Troponin I [[id number]]  (Normal) Collected: 07/14/21 1330    Lab Status: Final result Specimen: Blood from Arm, Right Updated: 07/14/21 1355     Troponin I <0 02 ng/mL     Comprehensive metabolic panel [[id number]]  (Abnormal) Collected: 07/14/21 1330    Lab Status: Final result Specimen: Blood from Arm, Right Updated: 07/14/21 1353     Sodium 139 mmol/L      Potassium 4 4 mmol/L      Chloride 102 mmol/L      CO2 25 mmol/L      ANION GAP 12 mmol/L      BUN 14 mg/dL      Creatinine 0 59 mg/dL      Glucose 86 mg/dL      Calcium 8 8 mg/dL      AST 13 U/L      ALT 17 U/L      Alkaline Phosphatase 72 U/L      Total Protein 7 9 g/dL      Albumin 3 7 g/dL      Total Bilirubin 0 68 mg/dL      eGFR 117 ml/min/1 73sq m     Narrative:      [place] guidelines for Chronic Kidney Disease (CKD):     Stage 1 with normal or high GFR (GFR > 90 mL/min/1 73 square meters)    Stage 2 Mild CKD (GFR = 60-89 mL/min/1 73 square meters)    Stage 3A Moderate CKD (GFR = 45-59 mL/min/1 73 square meters)    Stage 3B Moderate CKD (GFR = 30-44 mL/min/1 73 square meters)    Stage 4 Severe CKD (GFR = 15-29 mL/min/1 73 square meters)    Stage 5 End Stage CKD (GFR <15 mL/min/1 73 square meters)  Note: GFR calculation is accurate only with a steady state creatinine    CBC and differential [[id number]]  (Abnormal) Collected: 07/14/21 1330    Lab Status: Final result Specimen: Blood from Arm, Right Updated: 07/14/21 1341     WBC 8 76 Thousand/uL      RBC 4 79 Million/uL      Hemoglobin 12 1 g/dL      Hematocrit 38 5 %      MCV 80 fL      MCH 25 3 pg      MCHC 31 4 g/dL      RDW 13 9 %      MPV 10 5 fL      Platelets 568 Thousands/uL      nRBC 0 /100 WBCs      Neutrophils Relative 70 %      Immat GRANS % 0 %      Lymphocytes Relative 18 %      Monocytes Relative 10 %      Eosinophils Relative 2 %      Basophils Relative 0 %      Neutrophils Absolute 6 03 Thousands/µL      Immature Grans Absolute 0 03 Thousand/uL      Lymphocytes Absolute 1 56 Thousands/µL      Monocytes Absolute 0 91 Thousand/µL      Eosinophils Absolute 0 20 Thousand/µL      Basophils Absolute 0 03 Thousands/µL                  No orders to display              Procedures  Procedures         ED Course                                           MDM  Number of Diagnoses or Management Options  Diagnosis management comments: It appears that a 1st nurse protocol with troponin was ordered on this patient upon nurse triage  The patient does not have chest pain  no sob  No concerns for cardiac pain at this time  No cardiac eval is needed at this time         Amount and/or Complexity of Data Reviewed  Clinical lab tests: ordered and reviewed        Disposition  Final diagnoses:   Muscle pain, fibromyalgia     Time reflects when diagnosis was documented in both MDM as applicable and the Disposition within this note     Time User Action Codes Description Comment    7/14/2021  2:01 PM Ninfa Ore Add [M79 7] Muscle pain, fibromyalgia     7/14/2021  2:01 PM Ninfa Ore Add [H05 02] Myalgia     7/14/2021  2:02 PM Ninfa Ore Add [M25 50] Arthralgia       ED Disposition     ED Disposition Condition Date/Time Comment    Discharge Stable Wed Jul 14, 2021  2:01 PM Isreal French discharge to home/self care  Follow-up Information     Follow up With Specialties Details Why Contact Info    Eloise Walters MD Internal Medicine, Rheumatology Call in 1 day to schedule follow-up for this patient  [facility] [phone] [phone]            Current Discharge Medication List      CONTINUE these medications which have CHANGED    Details   ibuprofen (MOTRIN) 800 mg tablet Take 1 tablet (800 mg total) by mouth every 8 (eight) hours as needed for moderate pain  Qty: 21 tablet, Refills: 0    Associated Diagnoses: Myalgia      methocarbamol (ROBAXIN) 500 mg tablet Take 1 tablet (500 mg total) by mouth 3 (three) times a day as needed for muscle spasms  Qty: 21 tablet, Refills: 0    Associated Diagnoses: Arthralgia         CONTINUE these medications which have NOT CHANGED    Details   diclofenac sodium (VOLTAREN) 1 % Apply 2 g topically 4 (four) times a day as needed (pain)  Qty: 100 g, Refills: 0    Associated Diagnoses: Right elbow pain      dicyclomine (BENTYL) 20 mg tablet Take 1 tablet (20 mg total) by mouth 2 (two) times a day for 3 days  Qty: 6 tablet, Refills: 0    Associated Diagnoses: Nonspecific abdominal pain      EPINEPHrine (EPIPEN) 0 3 mg/0 3 mL SOAJ Inject 0 3 mL into the shoulder, thigh, or buttocks once for 1 dose If needed for allergic reaction  Proceed immediately to ED if you use the EpiPen    Qty: 2 each, Refills: 0      etanercept (ENBREL) 50 mg/mL SOSY Inject under the skin once a week HYDROcodone-acetaminophen (NORCO) 5-325 mg per tablet Take 1 tablet by mouth every 6 (six) hours as needed (Not relieved by Anti-inflammatory) for up to 12 dosesMax Daily Amount: 4 tablets  Qty: 12 tablet, Refills: 0    Associated Diagnoses: Acute bilateral thoracic back pain      hydrOXYzine HCL (ATARAX) 25 mg tablet Take 25 mg by mouth 3 (three) times a day      LORazepam (ATIVAN) 1 mg tablet Take 1 tablet (1 mg total) by mouth 3 (three) times a day as needed for anxiety for up to 5 days  Qty: 15 tablet, Refills: 0    Associated Diagnoses: Panic attack      Magnesium Hydroxide (MAGNESIA PO) Take by mouth      magnesium oxide (MAG-OX) 400 mg Take 1 tablet (400 mg total) by mouth 2 (two) times a day for 14 days  Qty: 28 tablet, Refills: 0    Associated Diagnoses: Myalgia      Melatonin 10 MG CAPS TAKE 1 CAPSULE BY MOUTH EVERYDAY AT BEDTIME      methylPREDNISolone (MEDROL) 4 MG tablet therapy pack Use as directed on package  Qty: 21 tablet, Refills: 0    Associated Diagnoses: Acute bilateral thoracic back pain      oxyCODONE-acetaminophen (PERCOCET) 5-325 mg per tablet Take 1 tablet by mouth every 8 (eight) hours as needed for moderate painMax Daily Amount: 3 tablets  Qty: 9 tablet, Refills: 0    Associated Diagnoses: TMJ syndrome      predniSONE 20 mg tablet Take 2 tablets (40 mg total) by mouth daily  Qty: 8 tablet, Refills: 0    Associated Diagnoses: Left hip pain           No discharge procedures on file      PDMP Review     None          ED Provider  Electronically Signed by           Shikha Che MD  07/14/21 2258

## 2022-12-10 ENCOUNTER — HOSPITAL ENCOUNTER (EMERGENCY)
Facility: HOSPITAL | Age: 37
Discharge: HOME | End: 2022-12-10
Attending: EMERGENCY MEDICINE

## 2022-12-10 ENCOUNTER — APPOINTMENT (EMERGENCY)
Dept: RADIOLOGY | Facility: HOSPITAL | Age: 37
End: 2022-12-10

## 2022-12-10 VITALS
OXYGEN SATURATION: 98 % | RESPIRATION RATE: 22 BRPM | SYSTOLIC BLOOD PRESSURE: 107 MMHG | DIASTOLIC BLOOD PRESSURE: 73 MMHG | HEART RATE: 82 BPM | TEMPERATURE: 98.2 F

## 2022-12-10 DIAGNOSIS — R07.9 CHEST PAIN: Primary | ICD-10-CM

## 2022-12-10 LAB
ALBUMIN SERPL BCP-MCNC: 4.1 G/DL (ref 3.5–5)
ALP SERPL-CCNC: 78 U/L (ref 46–116)
ALT SERPL W P-5'-P-CCNC: 16 U/L (ref 12–78)
ANION GAP SERPL CALCULATED.3IONS-SCNC: 8 MMOL/L (ref 4–13)
AST SERPL W P-5'-P-CCNC: 19 U/L (ref 5–45)
BASOPHILS # BLD AUTO: 0.04 THOUSANDS/ÂΜL (ref 0–0.1)
BASOPHILS NFR BLD AUTO: 1 % (ref 0–1)
BILIRUB SERPL-MCNC: 0.45 MG/DL (ref 0.2–1)
BUN SERPL-MCNC: 20 MG/DL (ref 5–25)
CALCIUM SERPL-MCNC: 8.3 MG/DL (ref 8.3–10.1)
CARDIAC TROPONIN I PNL SERPL HS: <2 NG/L
CARDIAC TROPONIN I PNL SERPL HS: <2 NG/L
CHLORIDE SERPL-SCNC: 103 MMOL/L (ref 96–108)
CO2 SERPL-SCNC: 26 MMOL/L (ref 21–32)
CREAT SERPL-MCNC: 1.27 MG/DL (ref 0.6–1.3)
EOSINOPHIL # BLD AUTO: 0.26 THOUSAND/ÂΜL (ref 0–0.61)
EOSINOPHIL NFR BLD AUTO: 3 % (ref 0–6)
ERYTHROCYTE [DISTWIDTH] IN BLOOD BY AUTOMATED COUNT: 15.5 % (ref 11.6–15.1)
GFR SERPL CREATININE-BSD FRML MDRD: 53 ML/MIN/1.73SQ M
GLUCOSE SERPL-MCNC: 94 MG/DL (ref 65–140)
HCT VFR BLD AUTO: 36.3 % (ref 34.8–46.1)
HGB BLD-MCNC: 11.6 G/DL (ref 11.5–15.4)
IMM GRANULOCYTES # BLD AUTO: 0.02 THOUSAND/UL (ref 0–0.2)
IMM GRANULOCYTES NFR BLD AUTO: 0 % (ref 0–2)
LYMPHOCYTES # BLD AUTO: 2.47 THOUSANDS/ÂΜL (ref 0.6–4.47)
LYMPHOCYTES NFR BLD AUTO: 28 % (ref 14–44)
MCH RBC QN AUTO: 26.2 PG (ref 26.8–34.3)
MCHC RBC AUTO-ENTMCNC: 32 G/DL (ref 31.4–37.4)
MCV RBC AUTO: 82 FL (ref 82–98)
MONOCYTES # BLD AUTO: 0.75 THOUSAND/ÂΜL (ref 0.17–1.22)
MONOCYTES NFR BLD AUTO: 9 % (ref 4–12)
NEUTROPHILS # BLD AUTO: 5.2 THOUSANDS/ÂΜL (ref 1.85–7.62)
NEUTS SEG NFR BLD AUTO: 59 % (ref 43–75)
NRBC BLD AUTO-RTO: 0 /100 WBCS
PLATELET # BLD AUTO: 360 THOUSANDS/UL (ref 149–390)
PMV BLD AUTO: 11.3 FL (ref 8.9–12.7)
POTASSIUM SERPL-SCNC: 3.7 MMOL/L (ref 3.5–5.3)
PROT SERPL-MCNC: 8 G/DL (ref 6.4–8.4)
RBC # BLD AUTO: 4.43 MILLION/UL (ref 3.81–5.12)
SODIUM SERPL-SCNC: 137 MMOL/L (ref 135–147)
WBC # BLD AUTO: 8.74 THOUSAND/UL (ref 4.31–10.16)

## 2022-12-10 RX ORDER — IBUPROFEN 800 MG/1
800 TABLET ORAL 3 TIMES DAILY
Qty: 21 TABLET | Refills: 0 | Status: SHIPPED | OUTPATIENT
Start: 2022-12-10

## 2022-12-10 RX ORDER — METHOCARBAMOL 500 MG/1
500 TABLET, FILM COATED ORAL 2 TIMES DAILY
Qty: 20 TABLET | Refills: 0 | Status: SHIPPED | OUTPATIENT
Start: 2022-12-10

## 2022-12-10 RX ORDER — KETOROLAC TROMETHAMINE 30 MG/ML
15 INJECTION, SOLUTION INTRAMUSCULAR; INTRAVENOUS ONCE
Status: COMPLETED | OUTPATIENT
Start: 2022-12-10 | End: 2022-12-10

## 2022-12-10 RX ORDER — SUCRALFATE 1 G/1
1 TABLET ORAL 4 TIMES DAILY
Qty: 28 TABLET | Refills: 0 | Status: SHIPPED | OUTPATIENT
Start: 2022-12-10 | End: 2022-12-10

## 2022-12-10 RX ORDER — FAMOTIDINE 20 MG/1
20 TABLET, FILM COATED ORAL 2 TIMES DAILY
Qty: 30 TABLET | Refills: 0 | Status: SHIPPED | OUTPATIENT
Start: 2022-12-10 | End: 2022-12-10

## 2022-12-10 RX ORDER — METHOCARBAMOL 500 MG/1
500 TABLET, FILM COATED ORAL ONCE
Status: COMPLETED | OUTPATIENT
Start: 2022-12-10 | End: 2022-12-10

## 2022-12-10 RX ADMIN — METHOCARBAMOL 500 MG: 500 TABLET ORAL at 22:24

## 2022-12-10 RX ADMIN — KETOROLAC TROMETHAMINE 15 MG: 30 INJECTION, SOLUTION INTRAMUSCULAR at 22:24

## 2022-12-11 LAB
ATRIAL RATE: 81 BPM
P AXIS: 83 DEGREES
PR INTERVAL: 138 MS
QRS AXIS: 63 DEGREES
QRSD INTERVAL: 90 MS
QT INTERVAL: 350 MS
QTC INTERVAL: 406 MS
T WAVE AXIS: 56 DEGREES
VENTRICULAR RATE: 81 BPM

## 2022-12-11 NOTE — ED PROVIDER NOTES
History  Chief Complaint   Patient presents with   • Chest Pain     Pt arrived via hospital wheelchair with c/o chest pain that started 20 minutes ago  75-year-old female presents emergency department for evaluation of chest pain  Patient describes aching substernal chest pain which also radiates around the sides of her chest wall into her back  Worse with movement, 8 similar to previous body aches, is concerned because it is now on her chest as well  No palpitations no lightheadedness no shortness of breath, no recent fevers or chills  No syncope or presyncope  Prior to Admission Medications   Prescriptions Last Dose Informant Patient Reported? Taking? EPINEPHrine (EPIPEN) 0 3 mg/0 3 mL SOAJ   No No   Sig: Inject 0 3 mL into the shoulder, thigh, or buttocks once for 1 dose If needed for allergic reaction  Proceed immediately to ED if you use the EpiPen     HYDROcodone-acetaminophen (NORCO) 5-325 mg per tablet   No No   Sig: Take 1 tablet by mouth every 6 (six) hours as needed (Not relieved by Anti-inflammatory) for up to 12 dosesMax Daily Amount: 4 tablets   Patient not taking: Reported on 10/2/2020   LORazepam (ATIVAN) 1 mg tablet   No No   Sig: Take 1 tablet (1 mg total) by mouth 3 (three) times a day as needed for anxiety for up to 5 days   Magnesium Hydroxide (MAGNESIA PO)   Yes No   Sig: Take by mouth   Melatonin 10 MG CAPS   Yes No   Sig: TAKE 1 CAPSULE BY MOUTH EVERYDAY AT BEDTIME   diclofenac sodium (VOLTAREN) 1 %   No No   Sig: Apply 2 g topically 4 (four) times a day as needed (pain)   Patient not taking: Reported on 10/2/2020   dicyclomine (BENTYL) 20 mg tablet   No No   Sig: Take 1 tablet (20 mg total) by mouth 2 (two) times a day for 3 days   etanercept (ENBREL) 50 mg/mL SOSY   Yes No   Sig: Inject under the skin once a week   hydrOXYzine HCL (ATARAX) 25 mg tablet   Yes No   Sig: Take 25 mg by mouth 3 (three) times a day   ibuprofen (MOTRIN) 800 mg tablet   No No   Sig: Take 1 tablet (800 mg total) by mouth every 8 (eight) hours as needed for moderate pain   magnesium oxide (MAG-OX) 400 mg   No No   Sig: Take 1 tablet (400 mg total) by mouth 2 (two) times a day for 14 days   methocarbamol (ROBAXIN) 500 mg tablet   No No   Sig: Take 1 tablet (500 mg total) by mouth 3 (three) times a day as needed for muscle spasms   methylPREDNISolone (MEDROL) 4 MG tablet therapy pack   No No   Sig: Use as directed on package   Patient not taking: Reported on 11/16/2019   oxyCODONE-acetaminophen (PERCOCET) 5-325 mg per tablet   No No   Sig: Take 1 tablet by mouth every 8 (eight) hours as needed for moderate painMax Daily Amount: 3 tablets   Patient not taking: Reported on 10/2/2020   predniSONE 20 mg tablet   No No   Sig: Take 2 tablets (40 mg total) by mouth daily   Patient not taking: Reported on 10/2/2020      Facility-Administered Medications: None       Past Medical History:   Diagnosis Date   • Anxiety    • Arthritis    • Asthma    • Fibromyalgia    • Panic attack    • Rheumatoid arthritis ([facility] [address] )        Past Surgical History:   Procedure Laterality Date   • CARPAL TUNNEL RELEASE Right    • TUBAL LIGATION     • TUBAL LIGATION         History reviewed  No pertinent family history  I have reviewed and agree with the history as documented  E-Cigarette/Vaping   • E-Cigarette Use Never User      E-Cigarette/Vaping Substances     Social History     Tobacco Use   • Smoking status: Never   • Smokeless tobacco: Never   Vaping Use   • Vaping Use: Never used   Substance Use Topics   • Alcohol use: No   • Drug use: No       Review of Systems   Constitutional: Negative for appetite change, chills, fatigue and fever  HENT: Negative for sneezing and sore throat  Eyes: Negative for visual disturbance  Respiratory: Negative for cough, choking, chest tightness, shortness of breath and wheezing  Cardiovascular: Positive for chest pain  Negative for palpitations     Gastrointestinal: Negative for abdominal pain, constipation, diarrhea, nausea and vomiting  Genitourinary: Negative for difficulty urinating and dysuria  Neurological: Negative for dizziness, weakness, light-headedness, numbness and headaches  All other systems reviewed and are negative  Physical Exam  Physical Exam  Vitals and nursing note reviewed  Constitutional:       General: She is not in acute distress  Appearance: She is well-developed  She is not diaphoretic  HENT:      Head: Normocephalic and atraumatic  Eyes:      Pupils: Pupils are equal, round, and reactive to light  Neck:      Vascular: No JVD  Trachea: No tracheal deviation  Cardiovascular:      Rate and Rhythm: Normal rate and regular rhythm  Heart sounds: Normal heart sounds  No murmur heard  No friction rub  No gallop  Pulmonary:      Effort: Pulmonary effort is normal  No respiratory distress  Breath sounds: Normal breath sounds  No wheezing or rales  Chest:      Chest wall: Tenderness present  Abdominal:      General: Bowel sounds are normal  There is no distension  Palpations: Abdomen is soft  Tenderness: There is no abdominal tenderness  There is no guarding or rebound  Skin:     General: Skin is warm and dry  Coloration: Skin is not pale  Neurological:      Mental Status: She is alert and oriented to person, place, and time  Cranial Nerves: No cranial nerve deficit  Motor: No abnormal muscle tone     Psychiatric:         Behavior: Behavior normal          Vital Signs  ED Triage Vitals [12/10/22 1941]   Temperature Pulse Respirations Blood Pressure SpO2   98 2 °F (36 8 °C) 82 21 (!) 141/102 100 %      Temp Source Heart Rate Source Patient Position - Orthostatic VS BP Location FiO2 (%)   Temporal Monitor Sitting Left arm --      Pain Score       --           Vitals:    12/10/22 2015 12/10/22 2100 12/10/22 2200 12/10/22 2300   BP: 111/78 115/76 107/70 107/73   Pulse: 83 99 81 82   Patient Position - Orthostatic VS:  Sitting           Visual Acuity      ED Medications  Medications   ketorolac (TORADOL) injection 15 mg (15 mg Intravenous Given 12/10/22 2224)   methocarbamol (ROBAXIN) tablet 500 mg (500 mg Oral Given 12/10/22 2224)       Diagnostic Studies  Results Reviewed     Procedure Component Value Units Date/Time    HS Troponin I 2hr [[id number]] Collected: 12/10/22 2225    Lab Status: Final result Specimen: Blood from Arm, Left Updated: 12/10/22 2254     hs TnI 2hr <2 ng/L      Delta 2hr hsTnI --    HS Troponin 0hr (reflex protocol) [[id number]]  (Normal) Collected: 12/10/22 2027    Lab Status: Final result Specimen: Blood from Arm, Left Updated: 12/10/22 2113     hs TnI 0hr <2 ng/L     Comprehensive metabolic panel [[id number]] Collected: 12/10/22 2027    Lab Status: Final result Specimen: Blood from Arm, Left Updated: 12/10/22 2105     Sodium 137 mmol/L      Potassium 3 7 mmol/L      Chloride 103 mmol/L      CO2 26 mmol/L      ANION GAP 8 mmol/L      BUN 20 mg/dL      Creatinine 1 27 mg/dL      Glucose 94 mg/dL      Calcium 8 3 mg/dL      AST 19 U/L      ALT 16 U/L      Alkaline Phosphatase 78 U/L      Total Protein 8 0 g/dL      Albumin 4 1 g/dL      Total Bilirubin 0 45 mg/dL      eGFR 53 ml/min/1 73sq m     Narrative:      Emile guidelines for Chronic Kidney Disease (CKD):   •  Stage 1 with normal or high GFR (GFR > 90 mL/min/1 73 square meters)  •  Stage 2 Mild CKD (GFR = 60-89 mL/min/1 73 square meters)  •  Stage 3A Moderate CKD (GFR = 45-59 mL/min/1 73 square meters)  •  Stage 3B Moderate CKD (GFR = 30-44 mL/min/1 73 square meters)  •  Stage 4 Severe CKD (GFR = 15-29 mL/min/1 73 square meters)  •  Stage 5 End Stage CKD (GFR <15 mL/min/1 73 square meters)  Note: GFR calculation is accurate only with a steady state creatinine    CBC and differential [[id number]]  (Abnormal) Collected: 12/10/22 2027    Lab Status: Final result Specimen: Blood from Arm, Left Updated: 12/10/22 2035     WBC 8 74 Thousand/uL      RBC 4 43 Million/uL      Hemoglobin 11 6 g/dL      Hematocrit 36 3 %      MCV 82 fL      MCH 26 2 pg      MCHC 32 0 g/dL      RDW 15 5 %      MPV 11 3 fL      Platelets 047 Thousands/uL      nRBC 0 /100 WBCs      Neutrophils Relative 59 %      Immat GRANS % 0 %      Lymphocytes Relative 28 %      Monocytes Relative 9 %      Eosinophils Relative 3 %      Basophils Relative 1 %      Neutrophils Absolute 5 20 Thousands/µL      Immature Grans Absolute 0 02 Thousand/uL      Lymphocytes Absolute 2 47 Thousands/µL      Monocytes Absolute 0 75 Thousand/µL      Eosinophils Absolute 0 26 Thousand/µL      Basophils Absolute 0 04 Thousands/µL                  XR chest 1 view portable   Final Result by Jack Quintana MD (12/11 5542)      No acute disease  2 9 cm pleural-based nodule in the lateral left upper lung  Follow-up is needed with a chest CT with no contrast         I personally discussed this study with Saulo Zhang on 12/11/2022 at 9:08 AM                            Workstation performed: [id number]                    Procedures  Procedures         ED Course                               SBIRT 20yo+    Flowsheet Row Most Recent Value   SBIRT (25 yo +)    In order to provide better care to our patients, we are screening all of our patients for alcohol and drug use  Would it be okay to ask you these screening questions? Yes Filed at: 12/10/2022 2027   Initial Alcohol Screen: US AUDIT-C     1  How often do you have a drink containing alcohol? 0 Filed at: 12/10/2022 2027   2  How many drinks containing alcohol do you have on a typical day you are drinking? 0 Filed at: 12/10/2022 2027   3a  Male UNDER 65: How often do you have five or more drinks on one occasion? 0 Filed at: 12/10/2022 2027   3b  FEMALE Any Age, or MALE 65+: How often do you have 4 or more drinks on one occassion?  0 Filed at: 12/10/2022 2027   Audit-C Score 0 Filed at: 12/10/2022 2027   DREA: How many times in the past year have you    Used an illegal drug or used a prescription medication for non-medical reasons? Never Filed at: 12/10/2022 2027                    MDM  Number of Diagnoses or Management Options  Chest pain  Diagnosis management comments: 38-year-old female with chest pain, seems consistent with her previous musculoskeletal pain though will screen for cardiovascular disease with chest x-ray, labs, EKG, troponin, will give NSAIDs, muscle relaxers, reassess  Disposition  Final diagnoses:   Chest pain     Time reflects when diagnosis was documented in both MDM as applicable and the Disposition within this note     Time User Action Codes Description Comment    12/10/2022 11:10 PM Adrián [address] [place] [address] [R07 9] Chest pain     12/10/2022 11:10 PM Kirt Sampson [K21 9] GERD (gastroesophageal reflux disease)     12/10/2022 11:10 PM Kirt Sampson [F45 8] Hyperventilation syndrome     12/10/2022 11:12 PM Kirt Sampson [K21 9] GERD (gastroesophageal reflux disease)     12/10/2022 11:12 PM Kirt Sampson [R07 9] Chest pain     12/10/2022 11:12 PM Kirt Sampson [F45 8] Hyperventilation syndrome     12/10/2022 11:12 PM Kirt Sampson [K21 9] GERD (gastroesophageal reflux disease)     12/10/2022 11:25 PM Marry Vallejo [R07 9] Chest pain       ED Disposition     ED Disposition   Discharge    Condition   Stable    Date/Time   Sat Dec 10, 2022 11:10 PM    Comment   Mable Steiner discharge to home/self care                 Follow-up Information     Follow up With Specialties Details Why Contact Info    Gregorio Solorio MD Internal Medicine   [address] [place] [id number]  [phone]            Discharge Medication List as of 12/10/2022 11:30 PM      START taking these medications    Details   !! ibuprofen (MOTRIN) 800 mg tablet Take 1 tablet (800 mg total) by mouth 3 (three) times a day, Starting Sat 12/10/2022, Normal      !! methocarbamol (ROBAXIN) 500 mg tablet Take 1 tablet (500 mg total) by mouth 2 (two) times a day, Starting Sat 12/10/2022, Normal       !! - Potential duplicate medications found  Please discuss with provider  CONTINUE these medications which have NOT CHANGED    Details   diclofenac sodium (VOLTAREN) 1 % Apply 2 g topically 4 (four) times a day as needed (pain), Starting Tue 12/3/2019, Print      dicyclomine (BENTYL) 20 mg tablet Take 1 tablet (20 mg total) by mouth 2 (two) times a day for 3 days, Starting Sat 5/11/2019, Until Sat 11/16/2019, Print      EPINEPHrine (EPIPEN) 0 3 mg/0 3 mL SOAJ Inject 0 3 mL into the shoulder, thigh, or buttocks once for 1 dose If needed for allergic reaction    Proceed immediately to ED if you use the EpiPen , Starting Mon 8/21/2017, Print      etanercept (ENBREL) 50 mg/mL SOSY Inject under the skin once a week, Historical Med      HYDROcodone-acetaminophen (NORCO) 5-325 mg per tablet Take 1 tablet by mouth every 6 (six) hours as needed (Not relieved by Anti-inflammatory) for up to 12 dosesMax Daily Amount: 4 tablets, Starting Fri 9/6/2019, Print      hydrOXYzine HCL (ATARAX) 25 mg tablet Take 25 mg by mouth 3 (three) times a day, Historical Med      !! ibuprofen (MOTRIN) 800 mg tablet Take 1 tablet (800 mg total) by mouth every 8 (eight) hours as needed for moderate pain, Starting Wed 7/14/2021, Normal      LORazepam (ATIVAN) 1 mg tablet Take 1 tablet (1 mg total) by mouth 3 (three) times a day as needed for anxiety for up to 5 days, Starting Sat 12/8/2018, Until Sat 11/16/2019, Print      Magnesium Hydroxide (MAGNESIA PO) Take by mouth, Historical Med      magnesium oxide (MAG-OX) 400 mg Take 1 tablet (400 mg total) by mouth 2 (two) times a day for 14 days, Starting Fri 10/2/2020, Until Fri 10/16/2020, Normal      Melatonin 10 MG CAPS TAKE 1 CAPSULE BY MOUTH EVERYDAY AT BEDTIME, Historical Med      !! methocarbamol (ROBAXIN) 500 mg tablet Take 1 tablet (500 mg total) by mouth 3 (three) times a day as needed for muscle spasms, Starting Wed 7/14/2021, Normal      methylPREDNISolone (MEDROL) 4 MG tablet therapy pack Use as directed on package, Print      oxyCODONE-acetaminophen (PERCOCET) 5-325 mg per tablet Take 1 tablet by mouth every 8 (eight) hours as needed for moderate painMax Daily Amount: 3 tablets, Starting Mon 2/24/2020, Normal      predniSONE 20 mg tablet Take 2 tablets (40 mg total) by mouth daily, Starting Mon 7/13/2020, Print       !! - Potential duplicate medications found  Please discuss with provider  No discharge procedures on file      PDMP Review     None          ED Provider  Electronically Signed by           Yu Jacobs MD  12/12/22 [id number]       Yu Jacobs MD  12/12/22 0530

## 2022-12-11 NOTE — RESULT ENCOUNTER NOTE
Spoke with patient's spouse regarding CXR finding of a 2 9cm pleural-based nodule in the lateral left upper lung  We discussed the Radiologist's recommendation for a follow up noncontrast CT chest which can be ordered by her PCP  Spouse understanding of plan to follow up with the patient's PCP to have this imaging study ordered and completed as soon as able  Call back complete

## 2023-03-19 ENCOUNTER — HOSPITAL ENCOUNTER (EMERGENCY)
Facility: HOSPITAL | Age: 38
Discharge: HOME | End: 2023-03-19
Attending: EMERGENCY MEDICINE | Admitting: EMERGENCY MEDICINE

## 2023-03-19 ENCOUNTER — APPOINTMENT (EMERGENCY)
Dept: RADIOLOGY | Facility: HOSPITAL | Age: 38
End: 2023-03-19

## 2023-03-19 VITALS
RESPIRATION RATE: 30 BRPM | HEART RATE: 72 BPM | TEMPERATURE: 97.6 F | SYSTOLIC BLOOD PRESSURE: 119 MMHG | DIASTOLIC BLOOD PRESSURE: 87 MMHG | OXYGEN SATURATION: 100 %

## 2023-03-19 DIAGNOSIS — E86.0 DEHYDRATION: ICD-10-CM

## 2023-03-19 DIAGNOSIS — R53.83 FATIGUE: Primary | ICD-10-CM

## 2023-03-19 LAB
ALBUMIN SERPL BCP-MCNC: 4.4 G/DL (ref 3.5–5)
ALP SERPL-CCNC: 62 U/L (ref 34–104)
ALT SERPL W P-5'-P-CCNC: 11 U/L (ref 7–52)
ANION GAP SERPL CALCULATED.3IONS-SCNC: 7 MMOL/L (ref 4–13)
AST SERPL W P-5'-P-CCNC: 16 U/L (ref 13–39)
BASOPHILS # BLD AUTO: 0.05 THOUSANDS/ÂΜL (ref 0–0.1)
BASOPHILS NFR BLD AUTO: 1 % (ref 0–1)
BILIRUB SERPL-MCNC: 0.48 MG/DL (ref 0.2–1)
BILIRUB UR QL STRIP: NEGATIVE
BUN SERPL-MCNC: 12 MG/DL (ref 5–25)
CALCIUM SERPL-MCNC: 9 MG/DL (ref 8.4–10.2)
CARDIAC TROPONIN I PNL SERPL HS: <2 NG/L
CARDIAC TROPONIN I PNL SERPL HS: <2 NG/L
CHLORIDE SERPL-SCNC: 106 MMOL/L (ref 96–108)
CLARITY UR: ABNORMAL
CO2 SERPL-SCNC: 23 MMOL/L (ref 21–32)
COLOR UR: YELLOW
CREAT SERPL-MCNC: 0.64 MG/DL (ref 0.6–1.3)
EOSINOPHIL # BLD AUTO: 0.33 THOUSAND/ÂΜL (ref 0–0.61)
EOSINOPHIL NFR BLD AUTO: 4 % (ref 0–6)
ERYTHROCYTE [DISTWIDTH] IN BLOOD BY AUTOMATED COUNT: 14.9 % (ref 11.6–15.1)
FLUAV RNA RESP QL NAA+PROBE: NEGATIVE
FLUBV RNA RESP QL NAA+PROBE: NEGATIVE
GFR SERPL CREATININE-BSD FRML MDRD: 112 ML/MIN/1.73SQ M
GLUCOSE SERPL-MCNC: 86 MG/DL (ref 65–140)
GLUCOSE UR STRIP-MCNC: NEGATIVE MG/DL
HCG SERPL QL: NEGATIVE
HCT VFR BLD AUTO: 36.6 % (ref 34.8–46.1)
HGB BLD-MCNC: 11.8 G/DL (ref 11.5–15.4)
HGB UR QL STRIP.AUTO: NEGATIVE
IMM GRANULOCYTES # BLD AUTO: 0.01 THOUSAND/UL (ref 0–0.2)
IMM GRANULOCYTES NFR BLD AUTO: 0 % (ref 0–2)
INR PPP: 0.99 (ref 0.84–1.19)
KETONES UR STRIP-MCNC: ABNORMAL MG/DL
LEUKOCYTE ESTERASE UR QL STRIP: NEGATIVE
LYMPHOCYTES # BLD AUTO: 2.85 THOUSANDS/ÂΜL (ref 0.6–4.47)
LYMPHOCYTES NFR BLD AUTO: 32 % (ref 14–44)
MCH RBC QN AUTO: 25.7 PG (ref 26.8–34.3)
MCHC RBC AUTO-ENTMCNC: 32.2 G/DL (ref 31.4–37.4)
MCV RBC AUTO: 80 FL (ref 82–98)
MONOCYTES # BLD AUTO: 0.67 THOUSAND/ÂΜL (ref 0.17–1.22)
MONOCYTES NFR BLD AUTO: 8 % (ref 4–12)
NEUTROPHILS # BLD AUTO: 4.96 THOUSANDS/ÂΜL (ref 1.85–7.62)
NEUTS SEG NFR BLD AUTO: 55 % (ref 43–75)
NITRITE UR QL STRIP: NEGATIVE
NRBC BLD AUTO-RTO: 0 /100 WBCS
PH UR STRIP.AUTO: 5.5 [PH]
PLATELET # BLD AUTO: 334 THOUSANDS/UL (ref 149–390)
PMV BLD AUTO: 10.8 FL (ref 8.9–12.7)
POTASSIUM SERPL-SCNC: 3.7 MMOL/L (ref 3.5–5.3)
PROT SERPL-MCNC: 7.4 G/DL (ref 6.4–8.4)
PROT UR STRIP-MCNC: NEGATIVE MG/DL
PROTHROMBIN TIME: 12.9 SECONDS (ref 11.6–14.5)
RBC # BLD AUTO: 4.59 MILLION/UL (ref 3.81–5.12)
RSV RNA RESP QL NAA+PROBE: NEGATIVE
SARS-COV-2 RNA RESP QL NAA+PROBE: NEGATIVE
SODIUM SERPL-SCNC: 136 MMOL/L (ref 135–147)
SP GR UR STRIP.AUTO: 1.02 (ref 1–1.03)
UROBILINOGEN UR STRIP-ACNC: <2 MG/DL
WBC # BLD AUTO: 8.87 THOUSAND/UL (ref 4.31–10.16)

## 2023-03-19 NOTE — ED PROVIDER NOTES
History  Chief Complaint   Patient presents with   • Weakness - Generalized     Patient c/o fatigue, dizziness, weakness x 1 week  Denies cough/congestion  Denies chest pain, admits to body aches  70-year-old female [place]-speaking mostly patient presents to the emergency department for evaluation of generalized malaise and fatigue  The patient states this has been progressively worsening over the last several weeks  Currently the patient is lying in bed, no apparent distress, speaking in full sentences, no positive Reph  She describes just generalized malaise and fatigue with no focal deficits or focal complaints of weakness  History provided by:  Patient   used: No    Medical Problem  Severity:  Mild  Onset quality:  Gradual  Timing:  Constant  Progression:  Worsening  Chronicity:  New  Associated symptoms: no abdominal pain, no cough, no ear pain, no headaches, no nausea and no shortness of breath        Prior to Admission Medications   Prescriptions Last Dose Informant Patient Reported? Taking? EPINEPHrine (EPIPEN) 0 3 mg/0 3 mL SOAJ   No No   Sig: Inject 0 3 mL into the shoulder, thigh, or buttocks once for 1 dose If needed for allergic reaction  Proceed immediately to ED if you use the EpiPen     HYDROcodone-acetaminophen (NORCO) 5-325 mg per tablet   No No   Sig: Take 1 tablet by mouth every 6 (six) hours as needed (Not relieved by Anti-inflammatory) for up to 12 dosesMax Daily Amount: 4 tablets   Patient not taking: Reported on 10/2/2020   LORazepam (ATIVAN) 1 mg tablet   No No   Sig: Take 1 tablet (1 mg total) by mouth 3 (three) times a day as needed for anxiety for up to 5 days   Magnesium Hydroxide (MAGNESIA PO)   Yes No   Sig: Take by mouth   Melatonin 10 MG CAPS   Yes No   Sig: TAKE 1 CAPSULE BY MOUTH EVERYDAY AT BEDTIME   diclofenac sodium (VOLTAREN) 1 %   No No   Sig: Apply 2 g topically 4 (four) times a day as needed (pain)   Patient not taking: Reported on 10/2/2020   dicyclomine (BENTYL) 20 mg tablet   No No   Sig: Take 1 tablet (20 mg total) by mouth 2 (two) times a day for 3 days   etanercept (ENBREL) 50 mg/mL SOSY   Yes No   Sig: Inject under the skin once a week   hydrOXYzine HCL (ATARAX) 25 mg tablet   Yes No   Sig: Take 25 mg by mouth 3 (three) times a day   ibuprofen (MOTRIN) 800 mg tablet   No No   Sig: Take 1 tablet (800 mg total) by mouth every 8 (eight) hours as needed for moderate pain   ibuprofen (MOTRIN) 800 mg tablet   No No   Sig: Take 1 tablet (800 mg total) by mouth 3 (three) times a day   magnesium oxide (MAG-OX) 400 mg   No No   Sig: Take 1 tablet (400 mg total) by mouth 2 (two) times a day for 14 days   methocarbamol (ROBAXIN) 500 mg tablet   No No   Sig: Take 1 tablet (500 mg total) by mouth 3 (three) times a day as needed for muscle spasms   methocarbamol (ROBAXIN) 500 mg tablet   No No   Sig: Take 1 tablet (500 mg total) by mouth 2 (two) times a day   methylPREDNISolone (MEDROL) 4 MG tablet therapy pack   No No   Sig: Use as directed on package   Patient not taking: Reported on 11/16/2019   oxyCODONE-acetaminophen (PERCOCET) 5-325 mg per tablet   No No   Sig: Take 1 tablet by mouth every 8 (eight) hours as needed for moderate painMax Daily Amount: 3 tablets   Patient not taking: Reported on 10/2/2020   predniSONE 20 mg tablet   No No   Sig: Take 2 tablets (40 mg total) by mouth daily   Patient not taking: Reported on 10/2/2020      Facility-Administered Medications: None       Past Medical History:   Diagnosis Date   • Anxiety    • Arthritis    • Asthma    • Fibromyalgia    • Panic attack    • Rheumatoid arthritis (HCC)        Past Surgical History:   Procedure Laterality Date   • CARPAL TUNNEL RELEASE Right    • TUBAL LIGATION     • TUBAL LIGATION         No family history on file  I have reviewed and agree with the history as documented      E-Cigarette/Vaping   • E-Cigarette Use Never User      E-Cigarette/Vaping Substances     Social History     Tobacco Use   • Smoking status: Never   • Smokeless tobacco: Never   Vaping Use   • Vaping Use: Never used   Substance Use Topics   • Alcohol use: No   • Drug use: No       Review of Systems   HENT: Negative for ear pain  Respiratory: Negative for cough and shortness of breath  Gastrointestinal: Negative for abdominal pain and nausea  Neurological: Negative for headaches  All other systems reviewed and are negative  Physical Exam  Physical Exam  Vitals and nursing note reviewed  Constitutional:       Appearance: She is well-developed  HENT:      Head: Normocephalic and atraumatic  Right Ear: External ear normal       Left Ear: External ear normal    Eyes:      Conjunctiva/sclera: Conjunctivae normal    Neck:      Thyroid: No thyromegaly  Vascular: No JVD  Trachea: No tracheal deviation  Cardiovascular:      Rate and Rhythm: Normal rate  Pulmonary:      Effort: Pulmonary effort is normal       Breath sounds: Normal breath sounds  No stridor  Abdominal:      General: There is no distension  Palpations: Abdomen is soft  There is no mass  Tenderness: There is no abdominal tenderness  There is no guarding  Hernia: No hernia is present  Musculoskeletal:         General: No tenderness or deformity  Normal range of motion  Lymphadenopathy:      Cervical: No cervical adenopathy  Skin:     General: Skin is warm  Coloration: Skin is not pale  Findings: No erythema or rash  Neurological:      Mental Status: She is alert and oriented to person, place, and time     Psychiatric:         Behavior: Behavior normal          Vital Signs  ED Triage Vitals   Temperature Pulse Respirations Blood Pressure SpO2   03/19/23 1448 03/19/23 1448 03/19/23 1448 03/19/23 1448 03/19/23 1448   97 6 °F (36 4 °C) 75 18 114/70 100 %      Temp Source Heart Rate Source Patient Position - Orthostatic VS BP Location FiO2 (%)   03/19/23 1448 03/19/23 1448 03/19/23 1648 03/19/23 1648 --   Oral Monitor Lying Left arm       Pain Score       --                  Vitals:    03/19/23 1858 03/19/23 1933 03/19/23 2003 03/19/23 2033   BP: 121/78 137/88 124/86 119/87   Pulse: 69 71 80 72   Patient Position - Orthostatic VS:             Visual Acuity      ED Medications  Medications - No data to display    Diagnostic Studies  Results Reviewed     Procedure Component Value Units Date/Time    HS Troponin I 2hr [[id number]] Collected: 03/19/23 1916    Lab Status: Final result Specimen: Blood from Arm, Left Updated: 03/19/23 1951     hs TnI 2hr <2 ng/L      Delta 2hr hsTnI --    HS Troponin I 4hr [[id number]]     Lab Status: No result Specimen: Blood     FLU/RSV/COVID - if FLU/RSV clinically relevant [[id number]]  (Normal) Collected: 03/19/23 1648    Lab Status: Final result Specimen: Nares from Nose Updated: 03/19/23 1741     SARS-CoV-2 Negative     INFLUENZA A PCR Negative     INFLUENZA B PCR Negative     RSV PCR Negative    Narrative:      FOR PEDIATRIC PATIENTS - copy/paste COVID Guidelines URL to browser: [email or web address] [email or web address]    SARS-CoV-2 assay is a Nucleic Acid Amplification assay intended for the  qualitative detection of nucleic acid from SARS-CoV-2 in nasopharyngeal  swabs  Results are for the presumptive identification of SARS-CoV-2 RNA  Positive results are indicative of infection with SARS-CoV-2, the virus  causing COVID-19, but do not rule out bacterial infection or co-infection  with other viruses  Laboratories within the [place] and its  territories are required to report all positive results to the appropriate  public health authorities  Negative results do not preclude SARS-CoV-2  infection and should not be used as the sole basis for treatment or other  patient management decisions  Negative results must be combined with  clinical observations, patient history, and epidemiological information    This test has not been FDA cleared or approved  This test has been authorized by FDA under an Emergency Use Authorization  (EUA)  This test is only authorized for the duration of time the  declaration that circumstances exist justifying the authorization of the  emergency use of an in vitro diagnostic tests for detection of SARS-CoV-2  virus and/or diagnosis of COVID-19 infection under section 564(b)(1) of  the Act, 21 U  S C  926UXI-4(L)(2), unless the authorization is terminated  or revoked sooner  The test has been validated but independent review by FDA  and CLIA is pending  Test performed using [employer] Gene[employer]pert: This RT-PCR assay targets N2,  a region unique to SARS-CoV-2  A conserved region in the E-gene was chosen  for pan-Sarbecovirus detection which includes SARS-CoV-2  According to CMS-2020-01-R, this platform meets the definition of high-throughput technology      hCG, qualitative pregnancy [[id number]]  (Normal) Collected: 03/19/23 1648    Lab Status: Final result Specimen: Blood from Arm, Right Updated: 03/19/23 1722     Preg, Serum Negative    HS Troponin 0hr (reflex protocol) [[id number]]  (Normal) Collected: 03/19/23 1648    Lab Status: Final result Specimen: Blood from Arm, Right Updated: 03/19/23 1721     hs TnI 0hr <2 ng/L     Comprehensive metabolic panel [[id number]] Collected: 03/19/23 1648    Lab Status: Final result Specimen: Blood from Arm, Right Updated: 03/19/23 1712     Sodium 136 mmol/L      Potassium 3 7 mmol/L      Chloride 106 mmol/L      CO2 23 mmol/L      ANION GAP 7 mmol/L      BUN 12 mg/dL      Creatinine 0 64 mg/dL      Glucose 86 mg/dL      Calcium 9 0 mg/dL      AST 16 U/L      ALT 11 U/L      Alkaline Phosphatase 62 U/L      Total Protein 7 4 g/dL      Albumin 4 4 g/dL      Total Bilirubin 0 48 mg/dL      eGFR 112 ml/min/1 73sq m     Narrative:      Emile guidelines for Chronic Kidney Disease (CKD):   •  Stage 1 with normal or high GFR (GFR > 90 mL/min/1 73 square meters)  •  Stage 2 Mild CKD (GFR = 60-89 mL/min/1 73 square meters)  •  Stage 3A Moderate CKD (GFR = 45-59 mL/min/1 73 square meters)  •  Stage 3B Moderate CKD (GFR = 30-44 mL/min/1 73 square meters)  •  Stage 4 Severe CKD (GFR = 15-29 mL/min/1 73 square meters)  •  Stage 5 End Stage CKD (GFR <15 mL/min/1 73 square meters)  Note: GFR calculation is accurate only with a steady state creatinine    Protime-INR [[id number]]  (Normal) Collected: 03/19/23 1648    Lab Status: Final result Specimen: Blood from Arm, Right Updated: 03/19/23 1704     Protime 12 9 seconds      INR 0 99    UA w Reflex to Microscopic w Reflex to Culture [[id number]]  (Abnormal) Collected: 03/19/23 1649    Lab Status: Final result Specimen: Urine, Clean Catch Updated: 03/19/23 1701     Color, UA Yellow     Clarity, UA Turbid     Specific Gravity, UA 1 022     pH, UA 5 5     Leukocytes, UA Negative     Nitrite, UA Negative     Protein, UA Negative mg/dl      Glucose, UA Negative mg/dl      Ketones, UA Trace mg/dl      Urobilinogen, UA <2 0 mg/dl      Bilirubin, UA Negative     Occult Blood, UA Negative    CBC and differential [[id number]]  (Abnormal) Collected: 03/19/23 1648    Lab Status: Final result Specimen: Blood from Arm, Right Updated: 03/19/23 1655     WBC 8 87 Thousand/uL      RBC 4 59 Million/uL      Hemoglobin 11 8 g/dL      Hematocrit 36 6 %      MCV 80 fL      MCH 25 7 pg      MCHC 32 2 g/dL      RDW 14 9 %      MPV 10 8 fL      Platelets 155 Thousands/uL      nRBC 0 /100 WBCs      Neutrophils Relative 55 %      Immat GRANS % 0 %      Lymphocytes Relative 32 %      Monocytes Relative 8 %      Eosinophils Relative 4 %      Basophils Relative 1 %      Neutrophils Absolute 4 96 Thousands/µL      Immature Grans Absolute 0 01 Thousand/uL      Lymphocytes Absolute 2 85 Thousands/µL      Monocytes Absolute 0 67 Thousand/µL      Eosinophils Absolute 0 33 Thousand/µL      Basophils Absolute 0 05 Thousands/µL                  XR chest 1 view portable    (Results Pending)              Procedures  Procedures         ED Course                                             Medical Decision Making  Dehydration: acute illness or injury  Fatigue: acute illness or injury  Amount and/or Complexity of Data Reviewed  Labs: ordered  Radiology: ordered  Disposition  Final diagnoses:   Fatigue   Dehydration     Time reflects when diagnosis was documented in both MDM as applicable and the Disposition within this note     Time User Action Codes Description Comment    3/19/2023  8:30 PM Laverne Prom Add [R53 83] Fatigue     3/19/2023  8:31 PM Laverne Prom Add [E86 0] Dehydration       ED Disposition     ED Disposition   Discharge    Condition   Stable    Date/Time   Sun Mar 19, 2023  8:30 PM    [facility] discharge to home/self care  Follow-up Information     Follow up With Specialties Details Why Contact Info Additional Information    [facility] Emergency Department Emergency Medicine   [address] Emergency Department, [address] [place] [address], [facility]          Discharge Medication List as of 3/19/2023  8:31 PM      CONTINUE these medications which have NOT CHANGED    Details   diclofenac sodium (VOLTAREN) 1 % Apply 2 g topically 4 (four) times a day as needed (pain), Starting Tue 12/3/2019, Print      dicyclomine (BENTYL) 20 mg tablet Take 1 tablet (20 mg total) by mouth 2 (two) times a day for 3 days, Starting Sat 5/11/2019, Until Sat 11/16/2019, Print      EPINEPHrine (EPIPEN) 0 3 mg/0 3 mL SOAJ Inject 0 3 mL into the shoulder, thigh, or buttocks once for 1 dose If needed for allergic reaction    Proceed immediately to ED if you use the EpiPen , Starting Mon 8/21/2017, Print      etanercept (ENBREL) 50 mg/mL SOSY Inject under the skin once a week, Historical Med      HYDROcodone-acetaminophen ([place]iddles) 5-325 mg per tablet Take 1 tablet by mouth every 6 (six) hours as needed (Not relieved by Anti-inflammatory) for up to 12 dosesMax Daily Amount: 4 tablets, Starting Fri 9/6/2019, Print      hydrOXYzine HCL (ATARAX) 25 mg tablet Take 25 mg by mouth 3 (three) times a day, Historical Med      !! ibuprofen (MOTRIN) 800 mg tablet Take 1 tablet (800 mg total) by mouth every 8 (eight) hours as needed for moderate pain, Starting Wed 7/14/2021, Normal      !! ibuprofen (MOTRIN) 800 mg tablet Take 1 tablet (800 mg total) by mouth 3 (three) times a day, Starting Sat 12/10/2022, Normal      LORazepam (ATIVAN) 1 mg tablet Take 1 tablet (1 mg total) by mouth 3 (three) times a day as needed for anxiety for up to 5 days, Starting Sat 12/8/2018, Until Sat 11/16/2019, Print      Magnesium Hydroxide (MAGNESIA PO) Take by mouth, Historical Med      magnesium oxide (MAG-OX) 400 mg Take 1 tablet (400 mg total) by mouth 2 (two) times a day for 14 days, Starting Fri 10/2/2020, Until Fri 10/16/2020, Normal      Melatonin 10 MG CAPS TAKE 1 CAPSULE BY MOUTH EVERYDAY AT BEDTIME, Historical Med      !! methocarbamol (ROBAXIN) 500 mg tablet Take 1 tablet (500 mg total) by mouth 3 (three) times a day as needed for muscle spasms, Starting Wed 7/14/2021, Normal      !! methocarbamol (ROBAXIN) 500 mg tablet Take 1 tablet (500 mg total) by mouth 2 (two) times a day, Starting Sat 12/10/2022, Normal      methylPREDNISolone (MEDROL) 4 MG tablet therapy pack Use as directed on package, Print      oxyCODONE-acetaminophen (PERCOCET) 5-325 mg per tablet Take 1 tablet by mouth every 8 (eight) hours as needed for moderate painMax Daily Amount: 3 tablets, Starting Mon 2/24/2020, Normal      predniSONE 20 mg tablet Take 2 tablets (40 mg total) by mouth daily, Starting Mon 7/13/2020, Print       !! - Potential duplicate medications found  Please discuss with provider  No discharge procedures on file      PDMP Review     None          ED Provider  Electronically Signed by           Li Red DO  03/19/23 [id number]

## 2023-03-20 LAB
ATRIAL RATE: 66 BPM
ATRIAL RATE: 75 BPM
P AXIS: 69 DEGREES
P AXIS: 70 DEGREES
PR INTERVAL: 140 MS
PR INTERVAL: 142 MS
QRS AXIS: 57 DEGREES
QRS AXIS: 60 DEGREES
QRSD INTERVAL: 102 MS
QRSD INTERVAL: 104 MS
QT INTERVAL: 406 MS
QT INTERVAL: 412 MS
QTC INTERVAL: 431 MS
QTC INTERVAL: 453 MS
T WAVE AXIS: 55 DEGREES
T WAVE AXIS: 57 DEGREES
VENTRICULAR RATE: 66 BPM
VENTRICULAR RATE: 75 BPM

## 2023-05-12 ENCOUNTER — HOSPITAL ENCOUNTER (EMERGENCY)
Facility: HOSPITAL | Age: 38
Discharge: HOME | End: 2023-05-12
Attending: EMERGENCY MEDICINE

## 2023-05-12 VITALS
RESPIRATION RATE: 13 BRPM | OXYGEN SATURATION: 99 % | TEMPERATURE: 97.9 F | SYSTOLIC BLOOD PRESSURE: 133 MMHG | HEART RATE: 70 BPM | DIASTOLIC BLOOD PRESSURE: 82 MMHG

## 2023-05-12 DIAGNOSIS — M54.12 CERVICAL RADICULOPATHY: ICD-10-CM

## 2023-05-12 DIAGNOSIS — M25.511 RIGHT SHOULDER PAIN: Primary | ICD-10-CM

## 2023-05-12 LAB
ALBUMIN SERPL BCP-MCNC: 4.4 G/DL (ref 3.5–5)
ALP SERPL-CCNC: 67 U/L (ref 34–104)
ALT SERPL W P-5'-P-CCNC: 9 U/L (ref 7–52)
ANION GAP SERPL CALCULATED.3IONS-SCNC: 6 MMOL/L (ref 4–13)
AST SERPL W P-5'-P-CCNC: 14 U/L (ref 13–39)
BASOPHILS # BLD AUTO: 0.04 THOUSANDS/ÂΜL (ref 0–0.1)
BASOPHILS NFR BLD AUTO: 1 % (ref 0–1)
BILIRUB SERPL-MCNC: 0.36 MG/DL (ref 0.2–1)
BUN SERPL-MCNC: 11 MG/DL (ref 5–25)
CALCIUM SERPL-MCNC: 9.4 MG/DL (ref 8.4–10.2)
CARDIAC TROPONIN I PNL SERPL HS: <2 NG/L
CHLORIDE SERPL-SCNC: 107 MMOL/L (ref 96–108)
CO2 SERPL-SCNC: 25 MMOL/L (ref 21–32)
CREAT SERPL-MCNC: 0.74 MG/DL (ref 0.6–1.3)
EOSINOPHIL # BLD AUTO: 0.31 THOUSAND/ÂΜL (ref 0–0.61)
EOSINOPHIL NFR BLD AUTO: 4 % (ref 0–6)
ERYTHROCYTE [DISTWIDTH] IN BLOOD BY AUTOMATED COUNT: 15.1 % (ref 11.6–15.1)
GFR SERPL CREATININE-BSD FRML MDRD: 102 ML/MIN/1.73SQ M
GLUCOSE SERPL-MCNC: 94 MG/DL (ref 65–140)
HCT VFR BLD AUTO: 34.9 % (ref 34.8–46.1)
HGB BLD-MCNC: 11.1 G/DL (ref 11.5–15.4)
IMM GRANULOCYTES # BLD AUTO: 0.02 THOUSAND/UL (ref 0–0.2)
IMM GRANULOCYTES NFR BLD AUTO: 0 % (ref 0–2)
LYMPHOCYTES # BLD AUTO: 2.65 THOUSANDS/ÂΜL (ref 0.6–4.47)
LYMPHOCYTES NFR BLD AUTO: 33 % (ref 14–44)
MCH RBC QN AUTO: 25.4 PG (ref 26.8–34.3)
MCHC RBC AUTO-ENTMCNC: 31.8 G/DL (ref 31.4–37.4)
MCV RBC AUTO: 80 FL (ref 82–98)
MONOCYTES # BLD AUTO: 0.68 THOUSAND/ÂΜL (ref 0.17–1.22)
MONOCYTES NFR BLD AUTO: 8 % (ref 4–12)
NEUTROPHILS # BLD AUTO: 4.42 THOUSANDS/ÂΜL (ref 1.85–7.62)
NEUTS SEG NFR BLD AUTO: 54 % (ref 43–75)
NRBC BLD AUTO-RTO: 0 /100 WBCS
PLATELET # BLD AUTO: 436 THOUSANDS/UL (ref 149–390)
PMV BLD AUTO: 10.7 FL (ref 8.9–12.7)
POTASSIUM SERPL-SCNC: 3.7 MMOL/L (ref 3.5–5.3)
PROT SERPL-MCNC: 7.8 G/DL (ref 6.4–8.4)
RBC # BLD AUTO: 4.37 MILLION/UL (ref 3.81–5.12)
SODIUM SERPL-SCNC: 138 MMOL/L (ref 135–147)
WBC # BLD AUTO: 8.12 THOUSAND/UL (ref 4.31–10.16)

## 2023-05-12 RX ORDER — DIAZEPAM 5 MG/1
5 TABLET ORAL ONCE
Status: COMPLETED | OUTPATIENT
Start: 2023-05-12 | End: 2023-05-12

## 2023-05-12 RX ORDER — DIAZEPAM 5 MG/1
5 TABLET ORAL EVERY 8 HOURS PRN
Qty: 15 TABLET | Refills: 0 | Status: SHIPPED | OUTPATIENT
Start: 2023-05-12

## 2023-05-12 RX ORDER — DIAZEPAM 5 MG/1
5 TABLET ORAL EVERY 8 HOURS PRN
Qty: 15 TABLET | Refills: 0 | Status: SHIPPED | OUTPATIENT
Start: 2023-05-12 | End: 2023-05-12 | Stop reason: SDUPTHER

## 2023-05-12 RX ORDER — KETOROLAC TROMETHAMINE 30 MG/ML
15 INJECTION, SOLUTION INTRAMUSCULAR; INTRAVENOUS ONCE
Status: COMPLETED | OUTPATIENT
Start: 2023-05-12 | End: 2023-05-12

## 2023-05-12 RX ORDER — PREDNISONE 20 MG/1
60 TABLET ORAL DAILY
Qty: 15 TABLET | Refills: 0 | Status: SHIPPED | OUTPATIENT
Start: 2023-05-12 | End: 2023-05-17

## 2023-05-12 RX ADMIN — DIAZEPAM 5 MG: 5 TABLET ORAL at 19:21

## 2023-05-12 RX ADMIN — KETOROLAC TROMETHAMINE 15 MG: 30 INJECTION, SOLUTION INTRAMUSCULAR; INTRAVENOUS at 19:26

## 2023-05-12 NOTE — ED PROVIDER NOTES
History  Chief Complaint   Patient presents with   • Shoulder Pain     Right shoulder pain that started 2 weeks ago  States that pain started going into the right neck and right jaw today  HPI    Prior to Admission Medications   Prescriptions Last Dose Informant Patient Reported? Taking? EPINEPHrine (EPIPEN) 0 3 mg/0 3 mL SOAJ   No No   Sig: Inject 0 3 mL into the shoulder, thigh, or buttocks once for 1 dose If needed for allergic reaction  Proceed immediately to ED if you use the EpiPen     HYDROcodone-acetaminophen (NORCO) 5-325 mg per tablet   No No   Sig: Take 1 tablet by mouth every 6 (six) hours as needed (Not relieved by Anti-inflammatory) for up to 12 dosesMax Daily Amount: 4 tablets   Patient not taking: Reported on 10/2/2020   LORazepam (ATIVAN) 1 mg tablet   No No   Sig: Take 1 tablet (1 mg total) by mouth 3 (three) times a day as needed for anxiety for up to 5 days   Magnesium Hydroxide (MAGNESIA PO)   Yes No   Sig: Take by mouth   Melatonin 10 MG CAPS   Yes No   Sig: TAKE 1 CAPSULE BY MOUTH EVERYDAY AT BEDTIME   diclofenac sodium (VOLTAREN) 1 %   No No   Sig: Apply 2 g topically 4 (four) times a day as needed (pain)   Patient not taking: Reported on 10/2/2020   dicyclomine (BENTYL) 20 mg tablet   No No   Sig: Take 1 tablet (20 mg total) by mouth 2 (two) times a day for 3 days   etanercept (ENBREL) 50 mg/mL SOSY   Yes No   Sig: Inject under the skin once a week   hydrOXYzine HCL (ATARAX) 25 mg tablet   Yes No   Sig: Take 25 mg by mouth 3 (three) times a day   ibuprofen (MOTRIN) 800 mg tablet   No No   Sig: Take 1 tablet (800 mg total) by mouth every 8 (eight) hours as needed for moderate pain   ibuprofen (MOTRIN) 800 mg tablet   No No   Sig: Take 1 tablet (800 mg total) by mouth 3 (three) times a day   magnesium oxide (MAG-OX) 400 mg   No No   Sig: Take 1 tablet (400 mg total) by mouth 2 (two) times a day for 14 days   methocarbamol (ROBAXIN) 500 mg tablet   No No   Sig: Take 1 tablet (500 mg total) by mouth 3 (three) times a day as needed for muscle spasms   methocarbamol (ROBAXIN) 500 mg tablet   No No   Sig: Take 1 tablet (500 mg total) by mouth 2 (two) times a day   methylPREDNISolone (MEDROL) 4 MG tablet therapy pack   No No   Sig: Use as directed on package   Patient not taking: Reported on 11/16/2019   oxyCODONE-acetaminophen (PERCOCET) 5-325 mg per tablet   No No   Sig: Take 1 tablet by mouth every 8 (eight) hours as needed for moderate painMax Daily Amount: 3 tablets   Patient not taking: Reported on 10/2/2020   predniSONE 20 mg tablet   No No   Sig: Take 2 tablets (40 mg total) by mouth daily   Patient not taking: Reported on 10/2/2020      Facility-Administered Medications: None       Past Medical History:   Diagnosis Date   • Anxiety    • Arthritis    • Asthma    • Fibromyalgia    • Panic attack    • Rheumatoid arthritis ([facility] [address] )        Past Surgical History:   Procedure Laterality Date   • CARPAL TUNNEL RELEASE Right    • TUBAL LIGATION     • TUBAL LIGATION         History reviewed  No pertinent family history  I have reviewed and agree with the history as documented      E-Cigarette/Vaping   • E-Cigarette Use Never User      E-Cigarette/Vaping Substances     Social History     Tobacco Use   • Smoking status: Never   • Smokeless tobacco: Never   Vaping Use   • Vaping Use: Never used   Substance Use Topics   • Alcohol use: No   • Drug use: No       Review of Systems    Physical Exam  Physical Exam    Vital Signs  ED Triage Vitals [05/12/23 1736]   Temperature Pulse Respirations Blood Pressure SpO2   97 9 °F (36 6 °C) 79 18 133/82 99 %      Temp Source Heart Rate Source Patient Position - Orthostatic VS BP Location FiO2 (%)   Temporal Monitor Sitting Left arm --      Pain Score       --           Vitals:    05/12/23 1736   BP: 133/82   Pulse: 79   Patient Position - Orthostatic VS: Sitting         Visual Acuity      ED Medications  Medications - No data to display    Diagnostic Studies  Results Reviewed     None                 No orders to display              Procedures  Procedures         ED Course                               SBIRT 22yo+    Flowsheet Row Most Recent Value   Initial Alcohol Screen: US AUDIT-C     1  How often do you have a drink containing alcohol? 0 Filed at: 05/12/2023 1737   2  How many drinks containing alcohol do you have on a typical day you are drinking? 0 Filed at: 05/12/2023 1737   3b  FEMALE Any Age, or MALE 65+: How often do you have 4 or more drinks on one occassion? 0 Filed at: 05/12/2023 1737   Audit-C Score 0 Filed at: 05/12/2023 1737   DREA: How many times in the past year have you    Used an illegal drug or used a prescription medication for non-medical reasons? Never Filed at: 05/12/2023 1737                    MDM    Disposition  Final diagnoses:   None     ED Disposition     None      Follow-up Information    None         Patient's Medications   Discharge Prescriptions    No medications on file       No discharge procedures on file      PDMP Review     None          ED Provider  Electronically Signed by (VALIUM) tablet 5 mg (5 mg Oral Given 5/12/23 1921)   ketorolac (TORADOL) injection 15 mg (15 mg Intravenous Given 5/12/23 1926)       Diagnostic Studies  Results Reviewed     Procedure Component Value Units Date/Time    HS Troponin 0hr (reflex protocol) [[id number]]  (Normal) Collected: 05/12/23 1928    Lab Status: Final result Specimen: Blood from Arm, Right Updated: 05/12/23 2000     hs TnI 0hr <2 ng/L     Comprehensive metabolic panel [[id number]] Collected: 05/12/23 1928    Lab Status: Final result Specimen: Blood from Arm, Right Updated: 05/12/23 1956     Sodium 138 mmol/L      Potassium 3 7 mmol/L      Chloride 107 mmol/L      CO2 25 mmol/L      ANION GAP 6 mmol/L      BUN 11 mg/dL      Creatinine 0 74 mg/dL      Glucose 94 mg/dL      Calcium 9 4 mg/dL      AST 14 U/L      ALT 9 U/L      Alkaline Phosphatase 67 U/L      Total Protein 7 8 g/dL      Albumin 4 4 g/dL      Total Bilirubin 0 36 mg/dL      eGFR 102 ml/min/1 73sq m     Narrative:      [facility] guidelines for Chronic Kidney Disease (CKD):   •  Stage 1 with normal or high GFR (GFR > 90 mL/min/1 73 square meters)  •  Stage 2 Mild CKD (GFR = 60-89 mL/min/1 73 square meters)  •  Stage 3A Moderate CKD (GFR = 45-59 mL/min/1 73 square meters)  •  Stage 3B Moderate CKD (GFR = 30-44 mL/min/1 73 square meters)  •  Stage 4 Severe CKD (GFR = 15-29 mL/min/1 73 square meters)  •  Stage 5 End Stage CKD (GFR <15 mL/min/1 73 square meters)  Note: GFR calculation is accurate only with a steady state creatinine    CBC and differential [[id number]]  (Abnormal) Collected: 05/12/23 1928    Lab Status: Final result Specimen: Blood from Arm, Right Updated: 05/12/23 1935     WBC 8 12 Thousand/uL      RBC 4 37 Million/uL      Hemoglobin 11 1 g/dL      Hematocrit 34 9 %      MCV 80 fL      MCH 25 4 pg      MCHC 31 8 g/dL      RDW 15 1 %      MPV 10 7 fL      Platelets 096 Thousands/uL      nRBC 0 /100 WBCs      Neutrophils Relative 54 %      Immat GRANS % 0 %      Lymphocytes Relative 33 %      Monocytes Relative 8 %      Eosinophils Relative 4 %      Basophils Relative 1 %      Neutrophils Absolute 4 42 Thousands/µL      Immature Grans Absolute 0 02 Thousand/uL      Lymphocytes Absolute 2 65 Thousands/µL      Monocytes Absolute 0 68 Thousand/µL      Eosinophils Absolute 0 31 Thousand/µL      Basophils Absolute 0 04 Thousands/µL                  No orders to display              Procedures  ECG 12 Lead Documentation Only    Date/Time: 5/28/2023 4:28 PM    Performed by: Cale Rosales PA-C  Authorized by: Cale Rosales PA-C    ECG reviewed by me, the ED Provider: yes    Patient location:  ED  Interpretation:     Interpretation: normal    Quality:     Tracing quality:  Limited by artifact  Rate:     ECG rate:  68    ECG rate assessment: normal    Rhythm:     Rhythm: sinus rhythm    Ectopy:     Ectopy: none    QRS:     QRS axis:  Normal    QRS intervals:  Normal  Conduction:     Conduction: normal    ST segments:     ST segments:  Normal  T waves:     T waves: normal               ED Course             HEART Risk Score    Flowsheet Row Most Recent Value   Heart Score Risk Calculator    History 0 Filed at: 05/28/2023 1629   ECG 0 Filed at: 05/28/2023 1629   Age 0 Filed at: 05/28/2023 1629   Risk Factors 0 Filed at: 05/28/2023 1629   Troponin 0 Filed at: 05/28/2023 1629   HEART Score 0 Filed at: 05/28/2023 1629                        SBIRT 20yo+    Flowsheet Row Most Recent Value   Initial Alcohol Screen: US AUDIT-C     1  How often do you have a drink containing alcohol? 0 Filed at: 05/12/2023 1737   2  How many drinks containing alcohol do you have on a typical day you are drinking? 0 Filed at: 05/12/2023 1737   3b  FEMALE Any Age, or MALE 65+: How often do you have 4 or more drinks on one occassion? 0 Filed at: 05/12/2023 1737   Audit-C Score 0 Filed at: 05/12/2023 1737   DREA: How many times in the past year have you        Used an illegal drug or used a prescription medication for non-medical reasons? Never Filed at: 05/12/2023 0021                    Medical Decision Making  Differential diagnosis includes but not limited to sprain, strain, radiculopathy, doubt ACS, doubt MI, doubt PE  Plan: Cardiac work-up  MDM: 68-year-old with neck pain radiating to her chest   Suspect musculoskeletal etiology, possibly radiculopathy  Her troponin is negative, EKG is unremarkable  Benign exam   Recommended muscle relaxer, outpatient follow-up  Return parameters provided  Patient understands and agrees with this plan  Amount and/or Complexity of Data Reviewed  Labs: ordered  Risk  Prescription drug management  Disposition  Final diagnoses:   Right shoulder pain   Cervical radiculopathy     Time reflects when diagnosis was documented in both MDM as applicable and the Disposition within this note     Time User Action Codes Description Comment    5/12/2023  8:16 PM Kal Vallejo [M25 [id number]] Right shoulder pain     5/12/2023  8:17 PM Kal Vallejo [J42 [id number]] Cervical radiculopathy       ED Disposition     ED Disposition   Discharge    Condition   Stable    Date/Time   Fri May 12, 2023  8:16 PM    [address] discharge to home/self care                 Follow-up Information     Follow up With Specialties Details Why Contact Info Additional [address] [facility] Emergency Department Emergency Medicine Go to  If symptoms worsen [facility] [address] [facility] [id number] [facility] Emergency Department, [address] [place], [place], [address] [facility]   [address] [facility]  [address] [phone]  [address] [facility], [address] [address] [place], [place], [address] [facility] [address]          Discharge Medication List as of 5/12/2023  8:19 PM      START taking these medications    Details   ! ! predniSONE 20 mg tablet Take 3 tablets (60 mg total) by mouth daily for 5 days, Starting Fri 5/12/2023, Until Wed 5/17/2023, Print       !! - Potential duplicate medications found  Please discuss with provider  CONTINUE these medications which have CHANGED    Details   diazepam (VALIUM) 5 mg tablet Take 1 tablet (5 mg total) by mouth every 8 (eight) hours as needed for anxiety, Starting Fri 5/12/2023, Print         CONTINUE these medications which have NOT CHANGED    Details   diclofenac sodium (VOLTAREN) 1 % Apply 2 g topically 4 (four) times a day as needed (pain), Starting Tue 12/3/2019, Print      dicyclomine (BENTYL) 20 mg tablet Take 1 tablet (20 mg total) by mouth 2 (two) times a day for 3 days, Starting Sat 5/11/2019, Until Sat 11/16/2019, Print      EPINEPHrine (EPIPEN) 0 3 mg/0 3 mL SOAJ Inject 0 3 mL into the shoulder, thigh, or buttocks once for 1 dose If needed for allergic reaction    Proceed immediately to ED if you use the EpiPen , Starting Mon 8/21/2017, Print      etanercept (ENBREL) 50 mg/mL SOSY Inject under the skin once a week, Historical Med      HYDROcodone-acetaminophen (NORCO) 5-325 mg per tablet Take 1 tablet by mouth every 6 (six) hours as needed (Not relieved by Anti-inflammatory) for up to 12 dosesMax Daily Amount: 4 tablets, Starting Fri 9/6/2019, Print      hydrOXYzine HCL (ATARAX) 25 mg tablet Take 25 mg by mouth 3 (three) times a day, Historical Med      !! ibuprofen (MOTRIN) 800 mg tablet Take 1 tablet (800 mg total) by mouth every 8 (eight) hours as needed for moderate pain, Starting Wed 7/14/2021, Normal      !! ibuprofen (MOTRIN) 800 mg tablet Take 1 tablet (800 mg total) by mouth 3 (three) times a day, Starting Sat 12/10/2022, Normal      LORazepam (ATIVAN) 1 mg tablet Take 1 tablet (1 mg total) by mouth 3 (three) times a day as needed for anxiety for up to 5 days, Starting Sat 12/8/2018, Until Sat 11/16/2019, Print      Magnesium Hydroxide (MAGNESIA PO) Take by mouth, Historical Med      magnesium oxide (MAG-OX) 400 mg Take 1 tablet (400 mg total) by mouth 2 (two) times a day for 14 days, Starting Fri 10/2/2020, Until Fri 10/16/2020, Normal      Melatonin 10 MG CAPS TAKE 1 CAPSULE BY MOUTH EVERYDAY AT BEDTIME, Historical Med      !! methocarbamol (ROBAXIN) 500 mg tablet Take 1 tablet (500 mg total) by mouth 3 (three) times a day as needed for muscle spasms, Starting Wed 7/14/2021, Normal      !! methocarbamol (ROBAXIN) 500 mg tablet Take 1 tablet (500 mg total) by mouth 2 (two) times a day, Starting Sat 12/10/2022, Normal      methylPREDNISolone (MEDROL) 4 MG tablet therapy pack Use as directed on package, Print      oxyCODONE-acetaminophen (PERCOCET) 5-325 mg per tablet Take 1 tablet by mouth every 8 (eight) hours as needed for moderate painMax Daily Amount: 3 tablets, Starting Mon 2/24/2020, Normal      !! predniSONE 20 mg tablet Take 2 tablets (40 mg total) by mouth daily, Starting Mon 7/13/2020, Print       !! - Potential duplicate medications found  Please discuss with provider                PDMP Review     None          ED Provider  Electronically Signed by           Katrin Trivedi PA-C  05/28/23 6499

## 2023-05-13 LAB
ATRIAL RATE: 68 BPM
P AXIS: 79 DEGREES
PR INTERVAL: 144 MS
QRS AXIS: 76 DEGREES
QRSD INTERVAL: 98 MS
QT INTERVAL: 372 MS
QTC INTERVAL: 395 MS
T WAVE AXIS: 60 DEGREES
VENTRICULAR RATE: 68 BPM

## 2023-05-16 ENCOUNTER — APPOINTMENT (OUTPATIENT)
Dept: RADIOLOGY | Facility: CLINIC | Age: 38
End: 2023-05-16

## 2023-05-16 ENCOUNTER — OFFICE VISIT (OUTPATIENT)
Dept: OBGYN CLINIC | Facility: CLINIC | Age: 38
End: 2023-05-16

## 2023-05-16 VITALS
HEIGHT: 63 IN | HEART RATE: 71 BPM | WEIGHT: 123 LBS | BODY MASS INDEX: 21.79 KG/M2 | SYSTOLIC BLOOD PRESSURE: 115 MMHG | DIASTOLIC BLOOD PRESSURE: 82 MMHG

## 2023-05-16 DIAGNOSIS — S46.001A ROTATOR CUFF INJURY, RIGHT, INITIAL ENCOUNTER: ICD-10-CM

## 2023-05-16 DIAGNOSIS — M54.12 CERVICAL RADICULOPATHY: Primary | ICD-10-CM

## 2023-05-16 DIAGNOSIS — M54.12 CERVICAL RADICULOPATHY: ICD-10-CM

## 2023-05-16 DIAGNOSIS — M62.838 CERVICAL PARASPINAL MUSCLE SPASM: ICD-10-CM

## 2023-05-16 DIAGNOSIS — M25.511 RIGHT SHOULDER PAIN: ICD-10-CM

## 2023-05-16 DIAGNOSIS — M62.838 TRAPEZIUS MUSCLE SPASM: ICD-10-CM

## 2023-05-16 RX ORDER — BACLOFEN 10 MG/1
10 TABLET ORAL 3 TIMES DAILY PRN
Qty: 30 TABLET | Refills: 0 | Status: SHIPPED | OUTPATIENT
Start: 2023-05-16

## 2023-05-16 RX ORDER — KETOROLAC TROMETHAMINE 10 MG/1
10 TABLET, FILM COATED ORAL 2 TIMES DAILY PRN
Qty: 20 TABLET | Refills: 0 | Status: SHIPPED | OUTPATIENT
Start: 2023-05-16

## 2023-05-16 NOTE — PROGRESS NOTES
Assessment/Plan:  Assessment/Plan   Diagnoses and all orders for this visit:    Cervical radiculopathy  -     Ambulatory Referral to Orthopedic Surgery  -     XR spine cervical complete 4 or 5 vw non injury; Future  -     MRI cervical spine wo contrast; Future  -     ketorolac (TORADOL) 10 mg tablet; Take 1 tablet (10 mg total) by mouth 2 (two) times a day as needed for moderate pain    Rotator cuff injury, right, initial encounter  -     XR shoulder 2+ vw right; Future  -     MRI shoulder right wo contrast; Future  -     ketorolac (TORADOL) 10 mg tablet; Take 1 tablet (10 mg total) by mouth 2 (two) times a day as needed for moderate pain    Cervical paraspinal muscle spasm  -     baclofen 10 mg tablet; Take 1 tablet (10 mg total) by mouth 3 (three) times a day as needed for muscle spasms    Trapezius muscle spasm  -     baclofen 10 mg tablet; Take 1 tablet (10 mg total) by mouth 3 (three) times a day as needed for muscle spasms        42year-old right-hand-dominant female with neck pain and right shoulder and right upper extremity pain and numbness more than 2 weeks duration  Discussed with patient and accompanying spouse physical exam, radiographs, impression, and plan  X-rays cervical spine noted for straightening of the lordosis  X-rays right shoulder are unremarkable  Physical exam cervical spine noted for midline tenderness C2-C5 and right paraspinal tenderness  She has limited range of motion with forward flexion  There is positive axial load and negative Spurling's  She has normal strength, sensation, biceps reflex, and radial pulse both upper extremities  Right shoulder noted for tenderness at the trapezius  She has range of motion forward flexion to 170 degrees, abduction 90 degrees, and internal rotation to the lumbar spine  She had normal strength in rotator cuff  Empty can test is unremarkable  There is positive Montanez    Clinical impression is she has symptoms from condition of cervical spine pathology and rotator cuff pathology  Symptoms persist despite conservative management of more than 3 months of formal therapy and chiropractic, home exercises, NSAIDs, muscle relaxers, oral steroids  At this time I will refer for MRI of the cervical spine to evaluate for disc pathology, stenosis, and nerve impingement as invasive management may be warranted  I will refer for MRI of the right shoulder to evaluate for rotator cuff tear and impingement as invasive management may be warranted  She will return after having MRIs done  In interim she start taking turmeric at least 1000 mg daily and tart cherry at least 1000 mg daily  Subjective:   Patient ID: Daniel Hsu is a 42 y o  female  Chief Complaint   Patient presents with   • Neck - Pain, Numbness   • Right Shoulder - Pain       40-year-old right-hand-dominant female is accompanied by spouse for evaluation of neck pain and right upper extremity pain and numbness more than 8 months duration  She denies trauma or inciting event  Pain described as gradual in onset, localized to the right side of the neck, radiating distally to the shoulder and distally to the forearm and hand, worse with turning her head, worse moving the arm, associated with limited range of motion, associated numbness and tingling, and improved with resting  She started self treating with heat and also taking ibuprofen  andTylenol  She was seen at [facility] and started on conservative management  She was referred for formal therapy and chiropractic  She was tried on courses of oral steroid and muscle relaxers including cyclobenzaprine and methocarbamol without improvement    She attended chiropractic sessions 1-2 times a week 10/28/2022 through 3/29/2023 and since then continue with home exercise program   She had worsening of symptoms few weeks ago and presented to the emergency room and was given oral steroid and Valium and advised to "follow-up with orthopedic care  Neck Pain  This is a new problem  The current episode started more than 1 month ago  The problem occurs constantly  The problem has been gradually worsening  Associated symptoms include arthralgias, neck pain and numbness  Pertinent negatives include no abdominal pain, chest pain, chills, fever, rash, sore throat or weakness  Exacerbated by: Head turning, arm use  She has tried rest, NSAIDs, heat, acetaminophen and ice (Chiropractic, physical therapy, home exercise, muscle laxer, oral steroid) for the symptoms  The treatment provided mild relief  The following portions of the patient's history were reviewed and updated as appropriate: She  has a past medical history of Anxiety, Arthritis, Asthma, Fibromyalgia, Panic attack, and Rheumatoid arthritis ([address] )  She is allergic to shrimp extract allergy skin test - food allergy and shellfish allergy - food allergy       Review of Systems   Constitutional: Negative for chills and fever  HENT: Negative for sore throat  Eyes: Negative for visual disturbance  Respiratory: Negative for shortness of breath  Cardiovascular: Negative for chest pain  Gastrointestinal: Negative for abdominal pain  Genitourinary: Negative for flank pain  Musculoskeletal: Positive for arthralgias and neck pain  Skin: Negative for rash and wound  Neurological: Positive for numbness  Negative for weakness  Hematological: Does not bruise/bleed easily  Psychiatric/Behavioral: Negative for self-injury         Objective:  Vitals:    05/16/23 1133   BP: 115/82   Pulse: 71   Weight: 55 8 kg (123 lb)   Height: 5' 3\" (1 6 m)     Back Exam     Comments:    Cervical spine  - Midline tenderness C2-C5, right paraspinal tenderness  - Limited range of motion with forward flexion  - Positive axial load  - Negative Spurling's  - Normal sensation and bicep reflex both upper extremities      Right Hand Exam     Muscle Strength   The patient has normal " right wrist strength  Other   Sensation: normal  Pulse: present      Left Hand Exam     Muscle Strength   The patient has normal left wrist strength  Other   Sensation: normal  Pulse: present      Right Elbow Exam     Muscle Strength   The patient has normal right elbow strength (5/5 flexion and extension)  Other   Sensation: normal      Left Elbow Exam     Other   Sensation: normal      Right Shoulder Exam     Tenderness   Right shoulder tenderness location: Trapezius, anterior, lateral     Range of Motion   Active abduction: 90   Forward flexion: 170   Internal rotation 0 degrees: Lumbar     Muscle Strength   Right shoulder normal muscle strength: 4+/5 external rotation and supraspinatus  Abduction: 5/5   Internal rotation: 5/5     Tests   Montanez test: positive    Other   Sensation: normal    Comments:  Positive empty can test      Left Shoulder Exam     Other   Sensation: normal           Strength/Myotome Testing     Left Wrist/Hand   Normal wrist strength    Right Wrist/Hand   Normal wrist strength      Physical Exam  Vitals and nursing note reviewed  Constitutional:       General: She is not in acute distress  Appearance: She is well-developed  She is not ill-appearing or diaphoretic  HENT:      Head: Normocephalic and atraumatic  Right Ear: External ear normal       Left Ear: External ear normal    Eyes:      Conjunctiva/sclera: Conjunctivae normal    Neck:      Trachea: No tracheal deviation  Cardiovascular:      Rate and Rhythm: Normal rate  Pulmonary:      Effort: Pulmonary effort is normal  No respiratory distress  Abdominal:      General: There is no distension  Musculoskeletal:         General: Tenderness present  No swelling, deformity or signs of injury  Skin:     General: Skin is warm and dry  Coloration: Skin is not jaundiced or pale  Neurological:      Mental Status: She is alert and oriented to person, place, and time     Psychiatric:         Mood and Affect: Mood normal          Behavior: Behavior normal          Thought Content: Thought content normal          Judgment: Judgment normal          I have personally reviewed pertinent films in PACS and my interpretation is  X-rays cervical spine noted for straightening of the lordosis  X-rays right shoulder are unremarkable  Cynthia Parker

## 2023-05-16 NOTE — LETTER
May 16, 2023     Luther Woody PA-C  [address]  [place] [address]    Patient: Annie Currie   YOB: 1985   Date of Visit: 5/16/2023       Dear Dr Dread Beaver: Thank you for referring Annie Currie to me for evaluation  Below are my notes for this consultation  If you have questions, please do not hesitate to call me  I look forward to following your patient along with you  Sincerely,        Devin Parker DO        CC: No Recipients  C LOVELY Jacobs DO  5/16/2023 12:33 PM  Signed  Assessment/Plan:  Assessment/Plan   Diagnoses and all orders for this visit:    Cervical radiculopathy  -     Ambulatory Referral to Orthopedic Surgery  -     XR spine cervical complete 4 or 5 vw non injury; Future  -     MRI cervical spine wo contrast; Future  -     ketorolac (TORADOL) 10 mg tablet; Take 1 tablet (10 mg total) by mouth 2 (two) times a day as needed for moderate pain    Rotator cuff injury, right, initial encounter  -     XR shoulder 2+ vw right; Future  -     MRI shoulder right wo contrast; Future  -     ketorolac (TORADOL) 10 mg tablet; Take 1 tablet (10 mg total) by mouth 2 (two) times a day as needed for moderate pain    Cervical paraspinal muscle spasm  -     baclofen 10 mg tablet; Take 1 tablet (10 mg total) by mouth 3 (three) times a day as needed for muscle spasms    Trapezius muscle spasm  -     baclofen 10 mg tablet; Take 1 tablet (10 mg total) by mouth 3 (three) times a day as needed for muscle spasms        42year-old right-hand-dominant female with neck pain and right shoulder and right upper extremity pain and numbness more than 2 weeks duration  Discussed with patient and accompanying spouse physical exam, radiographs, impression, and plan  X-rays cervical spine noted for straightening of the lordosis  X-rays right shoulder are unremarkable    Physical exam cervical spine noted for midline tenderness C2-C5 and right paraspinal tenderness  She has limited range of motion with forward flexion  There is positive axial load and negative Spurling's  She has normal strength, sensation, biceps reflex, and radial pulse both upper extremities  Right shoulder noted for tenderness at the trapezius  She has range of motion forward flexion to 170 degrees, abduction 90 degrees, and internal rotation to the lumbar spine  She had normal strength in rotator cuff  Empty can test is unremarkable  There is positive Lisseth  Clinical impression is she has symptoms from condition of cervical spine pathology and rotator cuff pathology  Symptoms persist despite conservative management of more than 3 months of formal therapy and chiropractic, home exercises, NSAIDs, muscle relaxers, oral steroids  At this time I will refer for MRI of the cervical spine to evaluate for disc pathology, stenosis, and nerve impingement as invasive management may be warranted  I will refer for MRI of the right shoulder to evaluate for rotator cuff tear and impingement as invasive management may be warranted  She will return after having MRIs done  In interim she start taking turmeric at least 1000 mg daily and tart cherry at least 1000 mg daily  Subjective:   Patient ID: Sherryle Hinds is a 42 y o  female  Chief Complaint   Patient presents with   • Neck - Pain, Numbness   • Right Shoulder - Pain       38-year-old right-hand-dominant female is accompanied by spouse for evaluation of neck pain and right upper extremity pain and numbness more than 8 months duration  She denies trauma or inciting event  Pain described as gradual in onset, localized to the right side of the neck, radiating distally to the shoulder and distally to the forearm and hand, worse with turning her head, worse moving the arm, associated with limited range of motion, associated numbness and tingling, and improved with resting    She started self treating with heat and also taking ibuprofen  andTylenol  She was seen at [facility] and started on conservative management  She was referred for formal therapy and chiropractic  She was tried on courses of oral steroid and muscle relaxers including cyclobenzaprine and methocarbamol without improvement  She attended chiropractic sessions 1-2 times a week 10/28/2022 through 3/29/2023 and since then continue with home exercise program   She had worsening of symptoms few weeks ago and presented to the emergency room and was given oral steroid and Valium and advised to follow-up with orthopedic care  Neck Pain  This is a new problem  The current episode started more than 1 month ago  The problem occurs constantly  The problem has been gradually worsening  Associated symptoms include arthralgias, neck pain and numbness  Pertinent negatives include no abdominal pain, chest pain, chills, fever, rash, sore throat or weakness  Exacerbated by: Head turning, arm use  She has tried rest, NSAIDs, heat, acetaminophen and ice (Chiropractic, physical therapy, home exercise, muscle laxer, oral steroid) for the symptoms  The treatment provided mild relief  The following portions of the patient's history were reviewed and updated as appropriate: She  has a past medical history of Anxiety, Arthritis, Asthma, Fibromyalgia, Panic attack, and Rheumatoid arthritis ([address] )  She is allergic to shrimp extract allergy skin test - food allergy and shellfish allergy - food allergy       Review of Systems   Constitutional: Negative for chills and fever  HENT: Negative for sore throat  Eyes: Negative for visual disturbance  Respiratory: Negative for shortness of breath  Cardiovascular: Negative for chest pain  Gastrointestinal: Negative for abdominal pain  Genitourinary: Negative for flank pain  Musculoskeletal: Positive for arthralgias and neck pain  Skin: Negative for rash and wound  Neurological: Positive for numbness  "Negative for weakness  Hematological: Does not bruise/bleed easily  Psychiatric/Behavioral: Negative for self-injury  Objective:  Vitals:    05/16/23 1133   BP: 115/82   Pulse: 71   Weight: 55 8 kg (123 lb)   Height: 5' 3\" (1 6 m)     Back Exam     Comments:    Cervical spine  - Midline tenderness C2-C5, right paraspinal tenderness  - Limited range of motion with forward flexion  - Positive axial load  - Negative Spurling's  - Normal sensation and bicep reflex both upper extremities      Right Hand Exam     Muscle Strength   The patient has normal right wrist strength  Other   Sensation: normal  Pulse: present      Left Hand Exam     Muscle Strength   The patient has normal left wrist strength  Other   Sensation: normal  Pulse: present      Right Elbow Exam     Muscle Strength   The patient has normal right elbow strength (5/5 flexion and extension)  Other   Sensation: normal      Left Elbow Exam     Other   Sensation: normal      Right Shoulder Exam     Tenderness   Right shoulder tenderness location: Trapezius, anterior, lateral     Range of Motion   Active abduction: 90   Forward flexion: 170   Internal rotation 0 degrees: Lumbar     Muscle Strength   Right shoulder normal muscle strength: 4+/5 external rotation and supraspinatus  Abduction: 5/5   Internal rotation: 5/5     Tests   Montanez test: positive    Other   Sensation: normal    Comments:  Positive empty can test      Left Shoulder Exam     Other   Sensation: normal           Strength/Myotome Testing     Left Wrist/Hand   Normal wrist strength    Right Wrist/Hand   Normal wrist strength      Physical Exam  Vitals and nursing note reviewed  Constitutional:       General: She is not in acute distress  Appearance: She is well-developed  She is not ill-appearing or diaphoretic  HENT:      Head: Normocephalic and atraumatic        Right Ear: External ear normal       Left Ear: External ear normal    Eyes:      Conjunctiva/sclera: " Conjunctivae normal    Neck:      Trachea: No tracheal deviation  Cardiovascular:      Rate and Rhythm: Normal rate  Pulmonary:      Effort: Pulmonary effort is normal  No respiratory distress  Abdominal:      General: There is no distension  Musculoskeletal:         General: Tenderness present  No swelling, deformity or signs of injury  Skin:     General: Skin is warm and dry  Coloration: Skin is not jaundiced or pale  Neurological:      Mental Status: She is alert and oriented to person, place, and time  Psychiatric:         Mood and Affect: Mood normal          Behavior: Behavior normal          Thought Content: Thought content normal          Judgment: Judgment normal          I have personally reviewed pertinent films in PACS and my interpretation is  X-rays cervical spine noted for straightening of the lordosis  X-rays right shoulder are unremarkable  Rosita Armstrong

## 2023-06-06 ENCOUNTER — HOSPITAL ENCOUNTER (OUTPATIENT)
Dept: MRI IMAGING | Facility: CLINIC | Age: 38
Discharge: HOME | End: 2023-06-06
Payer: COMMERCIAL

## 2023-06-06 DIAGNOSIS — S46.001A ROTATOR CUFF INJURY, RIGHT, INITIAL ENCOUNTER: ICD-10-CM

## 2023-06-06 DIAGNOSIS — M54.12 CERVICAL RADICULOPATHY: ICD-10-CM

## 2023-06-06 PROCEDURE — 73221 MRI JOINT UPR EXTREM W/O DYE: CPT

## 2023-06-06 PROCEDURE — G1004 CDSM NDSC: HCPCS

## 2023-06-06 PROCEDURE — 72141 MRI NECK SPINE W/O DYE: CPT

## 2023-06-08 ENCOUNTER — TELEPHONE (OUTPATIENT)
Dept: OBGYN CLINIC | Facility: HOSPITAL | Age: 38
End: 2023-06-08

## 2023-06-08 NOTE — TELEPHONE ENCOUNTER
Chang Steele from [facility] ([facility]) Radiology    Doctor/Office : Keith/ALEKSEY    Radiology CB# : [phone]    [facility] Radiology called to report Significant Findings on MRI

## 2023-06-12 ENCOUNTER — OFFICE VISIT (OUTPATIENT)
Dept: OBGYN CLINIC | Facility: CLINIC | Age: 38
End: 2023-06-12
Payer: COMMERCIAL

## 2023-06-12 VITALS
SYSTOLIC BLOOD PRESSURE: 131 MMHG | HEART RATE: 97 BPM | DIASTOLIC BLOOD PRESSURE: 89 MMHG | BODY MASS INDEX: 21.44 KG/M2 | WEIGHT: 121 LBS | HEIGHT: 63 IN

## 2023-06-12 DIAGNOSIS — S16.1XXD CERVICAL STRAIN, SUBSEQUENT ENCOUNTER: ICD-10-CM

## 2023-06-12 DIAGNOSIS — M75.41 SUBACROMIAL IMPINGEMENT OF RIGHT SHOULDER: ICD-10-CM

## 2023-06-12 DIAGNOSIS — M75.51 SUBACROMIAL BURSITIS OF RIGHT SHOULDER JOINT: ICD-10-CM

## 2023-06-12 DIAGNOSIS — M54.12 RADICULOPATHY, CERVICAL REGION: Primary | ICD-10-CM

## 2023-06-12 DIAGNOSIS — M62.838 TRAPEZIUS MUSCLE SPASM: ICD-10-CM

## 2023-06-12 PROCEDURE — 20610 DRAIN/INJ JOINT/BURSA W/O US: CPT | Performed by: FAMILY MEDICINE

## 2023-06-12 PROCEDURE — 99213 OFFICE O/P EST LOW 20 MIN: CPT | Performed by: FAMILY MEDICINE

## 2023-06-12 RX ORDER — BUPIVACAINE HYDROCHLORIDE 2.5 MG/ML
4 INJECTION, SOLUTION INFILTRATION; PERINEURAL
Status: COMPLETED | OUTPATIENT
Start: 2023-06-12 | End: 2023-06-12

## 2023-06-12 RX ORDER — BUPIVACAINE HYDROCHLORIDE 2.5 MG/ML
1 INJECTION, SOLUTION INFILTRATION; PERINEURAL
Status: COMPLETED | OUTPATIENT
Start: 2023-06-12 | End: 2023-06-12

## 2023-06-12 RX ORDER — TRIAMCINOLONE ACETONIDE 40 MG/ML
40 INJECTION, SUSPENSION INTRA-ARTICULAR; INTRAMUSCULAR
Status: COMPLETED | OUTPATIENT
Start: 2023-06-12 | End: 2023-06-12

## 2023-06-12 RX ADMIN — BUPIVACAINE HYDROCHLORIDE 4 ML: 2.5 INJECTION, SOLUTION INFILTRATION; PERINEURAL at 13:15

## 2023-06-12 RX ADMIN — BUPIVACAINE HYDROCHLORIDE 1 ML: 2.5 INJECTION, SOLUTION INFILTRATION; PERINEURAL at 13:15

## 2023-06-12 RX ADMIN — TRIAMCINOLONE ACETONIDE 40 MG: 40 INJECTION, SUSPENSION INTRA-ARTICULAR; INTRAMUSCULAR at 13:15

## 2023-06-12 NOTE — PROGRESS NOTES
Assessment/Plan:  Assessment/Plan   Diagnoses and all orders for this visit:    Radiculopathy, cervical region  -     Ambulatory Referral to Physical Therapy; Future    Subacromial bursitis of right shoulder joint  -     Ambulatory Referral to Physical Therapy; Future  -     Large joint arthrocentesis: R subacromial bursa    Trapezius muscle spasm  -     Ambulatory Referral to Physical Therapy; Future    Cervical strain, subsequent encounter  -     Ambulatory Referral to Physical Therapy; Future    Subacromial impingement of right shoulder  -     Ambulatory Referral to Physical Therapy; Future             42year-old right-hand-dominant female with neck pain and right shoulder pain more than 6 weeks duration  Discussed with patient MRI results, impression, and plan  MRI cervical spine is unremarkable  MRI right shoulder noted for trace fluid within the subacromial/subdeltoid bursa, mildly prominent right axillary lymph nodes, intraosseous ganglia along the greater tuberosity  Regards to her neck and right shoulder pain, symptoms likely due to combination of strain and inflammation/bursitis  I advised patient that surgery not warranted  I offered patient steroid injection to which she agreed  I administered mixture 3 cc 0 25% bupivacaine and 1 cc Kenalog to the shoulder subacromial space without complication  She is start physical therapy as soon as possible and do home exercises as directed  She will follow-up with me as needed in this regard  Regards to axillary lymphadenopathy I recommend she follow-up with primary care provider  Subjective:   Patient ID: Maribel Sheehan is a 42 y o  female  Chief Complaint   Patient presents with   • Neck - Follow-up, Pain   • Right Shoulder - Follow-up, Pain, Numbness       38-year-old right-hand-dominant female following for neck pain and right shoulder pain and right upper extremity pain and numbness more than 6 weeks duration    She was last seen by me 4 weeks "ago at which point she was referred for MRIs of cervical spine and right shoulder  She was prescribed Toradol and baclofen  Has been experiencing pain described as localized to the right side of the neck, rating distally to the shoulder and distally to the forearm and hand, worse with turning her head, worse with moving the arm, associated with limited range of motion, associated numbness and tingling, and improved with resting  Shoulder Pain  This is a new problem  The current episode started more than 1 month ago  The problem occurs daily  The problem has been unchanged  Associated symptoms include arthralgias, neck pain and numbness  Pertinent negatives include no joint swelling or weakness  Exacerbated by: Arm use  She has tried rest and NSAIDs (Oral steroid, muscle laxer, chiropractic) for the symptoms  The treatment provided mild relief  Review of Systems   Musculoskeletal: Positive for arthralgias and neck pain  Negative for joint swelling  Neurological: Positive for numbness  Negative for weakness  Objective:  Vitals:    06/12/23 1316   BP: 131/89   Pulse: 97   Weight: 54 9 kg (121 lb)   Height: 5' 3\" (1 6 m)     Ortho Exam    Physical Exam  Vitals and nursing note reviewed  Constitutional:       General: She is not in acute distress  Appearance: She is well-developed  She is not ill-appearing or diaphoretic  HENT:      Head: Normocephalic and atraumatic  Right Ear: External ear normal       Left Ear: External ear normal    Eyes:      Conjunctiva/sclera: Conjunctivae normal    Neck:      Trachea: No tracheal deviation  Cardiovascular:      Rate and Rhythm: Normal rate  Pulmonary:      Effort: Pulmonary effort is normal  No respiratory distress  Abdominal:      General: There is no distension  Skin:     General: Skin is warm and dry  Coloration: Skin is not jaundiced or pale     Neurological:      Mental Status: She is alert and oriented to person, place, " "and time  Psychiatric:         Mood and Affect: Mood normal          Behavior: Behavior normal          Thought Content: Thought content normal          Judgment: Judgment normal          I have personally reviewed pertinent films in PACS and my interpretation is No appreciable cervical spine disc pathology  No appreciable full-thickness rotator cuff tear  Large joint arthrocentesis: R subacromial bursa  Universal Protocol:  Consent: Verbal consent obtained  Risks and benefits: risks, benefits and alternatives were discussed  Consent given by: patient  Time out: Immediately prior to procedure a \"time out\" was called to verify the correct patient, procedure, equipment, support staff and site/side marked as required  Patient understanding: patient states understanding of the procedure being performed  Patient consent: the patient's understanding of the procedure matches consent given  Procedure consent: procedure consent matches procedure scheduled  Relevant documents: relevant documents present and verified  Test results: test results available and properly labeled  Site marked: the operative site was marked  Radiology Images displayed and confirmed   If images not available, report reviewed: imaging studies available  Required items: required blood products, implants, devices, and special equipment available  Patient identity confirmed: verbally with patient    Supporting Documentation  Indications: pain   Procedure Details  Location: shoulder - R subacromial bursa  Preparation: Patient was prepped and draped in the usual sterile fashion  Needle size: 22 G  Ultrasound guidance: no  Approach: posterolateral  Medications administered: 4 mL bupivacaine 0 25 %; 1 mL bupivacaine 0 25 %; 40 mg triamcinolone acetonide 40 mg/mL    Patient tolerance: patient tolerated the procedure well with no immediate complications  Dressing:  Sterile dressing applied            "

## 2023-06-14 ENCOUNTER — HOSPITAL ENCOUNTER (EMERGENCY)
Facility: HOSPITAL | Age: 38
Discharge: HOME | End: 2023-06-14
Attending: EMERGENCY MEDICINE | Admitting: EMERGENCY MEDICINE
Payer: COMMERCIAL

## 2023-06-14 VITALS
TEMPERATURE: 98.1 F | DIASTOLIC BLOOD PRESSURE: 98 MMHG | SYSTOLIC BLOOD PRESSURE: 138 MMHG | RESPIRATION RATE: 18 BRPM | OXYGEN SATURATION: 100 % | HEART RATE: 80 BPM

## 2023-06-14 DIAGNOSIS — M26.609 TMJ (TEMPOROMANDIBULAR JOINT DISORDER): Primary | ICD-10-CM

## 2023-06-14 PROCEDURE — 99282 EMERGENCY DEPT VISIT SF MDM: CPT

## 2023-06-14 RX ORDER — DICLOFENAC SODIUM 75 MG/1
75 TABLET, DELAYED RELEASE ORAL 2 TIMES DAILY
Qty: 30 TABLET | Refills: 0 | Status: SHIPPED | OUTPATIENT
Start: 2023-06-14

## 2023-06-14 NOTE — ED PROVIDER NOTES
History  Chief Complaint   Patient presents with   • Earache     Two weeks of pressure, facial pain, R sided facial numbness, denies dental pain, denies any other symptoms, neurologically intact  Face is symmetrical      HPI  56-year-old female presents with 2 weeks of right-sided ear/facial pain and pressure  No dental pain  Has history of TMJ pain  Pain is worse with moving jaw and chewing  No fevers, chills, difficulty breathing or swallowing  Prior to Admission Medications   Prescriptions Last Dose Informant Patient Reported? Taking? EPINEPHrine (EPIPEN) 0 3 mg/0 3 mL SOAJ   No No   Sig: Inject 0 3 mL into the shoulder, thigh, or buttocks once for 1 dose If needed for allergic reaction  Proceed immediately to ED if you use the EpiPen     HYDROcodone-acetaminophen (NORCO) 5-325 mg per tablet  Self No No   Sig: Take 1 tablet by mouth every 6 (six) hours as needed (Not relieved by Anti-inflammatory) for up to 12 dosesMax Daily Amount: 4 tablets   Patient not taking: Reported on 10/2/2020   LORazepam (ATIVAN) 1 mg tablet   No No   Sig: Take 1 tablet (1 mg total) by mouth 3 (three) times a day as needed for anxiety for up to 5 days   Magnesium Hydroxide (MAGNESIA PO)  Self Yes No   Sig: Take by mouth   Melatonin 10 MG CAPS  Self Yes No   Sig: TAKE 1 CAPSULE BY MOUTH EVERYDAY AT BEDTIME   baclofen 10 mg tablet  Self No No   Sig: Take 1 tablet (10 mg total) by mouth 3 (three) times a day as needed for muscle spasms   diazepam (VALIUM) 5 mg tablet  Self No No   Sig: Take 1 tablet (5 mg total) by mouth every 8 (eight) hours as needed for anxiety   diclofenac sodium (VOLTAREN) 1 %  Self No No   Sig: Apply 2 g topically 4 (four) times a day as needed (pain)   Patient not taking: Reported on 10/2/2020   dicyclomine (BENTYL) 20 mg tablet   No No   Sig: Take 1 tablet (20 mg total) by mouth 2 (two) times a day for 3 days   etanercept (ENBREL) 50 mg/mL SOSY  Self Yes No   Sig: Inject under the skin once a week   Patient not taking: Reported on 6/12/2023   hydrOXYzine HCL (ATARAX) 25 mg tablet  Self Yes No   Sig: Take 25 mg by mouth 3 (three) times a day   ibuprofen (MOTRIN) 800 mg tablet  Self No No   Sig: Take 1 tablet (800 mg total) by mouth every 8 (eight) hours as needed for moderate pain   ibuprofen (MOTRIN) 800 mg tablet  Self No No   Sig: Take 1 tablet (800 mg total) by mouth 3 (three) times a day   Patient not taking: Reported on 5/16/2023   ketorolac (TORADOL) 10 mg tablet  Self No No   Sig: Take 1 tablet (10 mg total) by mouth 2 (two) times a day as needed for moderate pain   magnesium oxide (MAG-OX) 400 mg   No No   Sig: Take 1 tablet (400 mg total) by mouth 2 (two) times a day for 14 days   methocarbamol (ROBAXIN) 500 mg tablet  Self No No   Sig: Take 1 tablet (500 mg total) by mouth 3 (three) times a day as needed for muscle spasms   Patient not taking: Reported on 5/16/2023   methocarbamol (ROBAXIN) 500 mg tablet  Self No No   Sig: Take 1 tablet (500 mg total) by mouth 2 (two) times a day   Patient not taking: Reported on 5/16/2023   methylPREDNISolone (MEDROL) 4 MG tablet therapy pack  Self No No   Sig: Use as directed on package   Patient not taking: Reported on 6/12/2023   oxyCODONE-acetaminophen (PERCOCET) 5-325 mg per tablet  Self No No   Sig: Take 1 tablet by mouth every 8 (eight) hours as needed for moderate painMax Daily Amount: 3 tablets   Patient not taking: Reported on 6/12/2023   predniSONE 20 mg tablet  Self No No   Sig: Take 2 tablets (40 mg total) by mouth daily   Patient not taking: Reported on 5/16/2023      Facility-Administered Medications: None       Past Medical History:   Diagnosis Date   • Anxiety    • Arthritis    • Asthma    • Fibromyalgia    • Panic attack    • Rheumatoid arthritis ([facility] [address] )        Past Surgical History:   Procedure Laterality Date   • CARPAL TUNNEL RELEASE Right    • TUBAL LIGATION     • TUBAL LIGATION         History reviewed  No pertinent family history    I have reviewed and agree with the history as documented  E-Cigarette/Vaping   • E-Cigarette Use Never User      E-Cigarette/Vaping Substances     Social History     Tobacco Use   • Smoking status: Never   • Smokeless tobacco: Never   Vaping Use   • Vaping Use: Never used   Substance Use Topics   • Alcohol use: No   • Drug use: No       Review of Systems   Constitutional: Negative for chills and fever  HENT: Negative for dental problem and ear pain          +facial pain, ear/jaw pain   Eyes: Negative for pain and redness  Respiratory: Negative for cough and shortness of breath  Cardiovascular: Negative for chest pain and palpitations  Gastrointestinal: Negative for abdominal pain and nausea  Endocrine: Negative for polydipsia and polyphagia  Genitourinary: Negative for dysuria and frequency  Musculoskeletal: Negative for arthralgias and joint swelling  Skin: Negative for color change and rash  Neurological: Negative for dizziness and headaches  Psychiatric/Behavioral: Negative for behavioral problems and confusion  All other systems reviewed and are negative  Physical Exam  Physical Exam  Vitals and nursing note reviewed  Constitutional:       General: She is not in acute distress  HENT:      Head:      Comments: TTP R TMJ     Right Ear: Tympanic membrane and external ear normal       Left Ear: Tympanic membrane and external ear normal       Nose: Nose normal    Eyes:      General: No scleral icterus  Cardiovascular:      Rate and Rhythm: Normal rate  Pulmonary:      Effort: Pulmonary effort is normal  No respiratory distress  Abdominal:      General: There is no distension  Musculoskeletal:         General: No deformity  Normal range of motion  Skin:     Findings: No rash  Neurological:      General: No focal deficit present  Mental Status: She is alert        Gait: Gait normal    Psychiatric:         Mood and Affect: Mood normal          Vital Signs  ED Triage Vitals [06/14/23 1538] Temperature Pulse Respirations Blood Pressure SpO2   98 1 °F (36 7 °C) 80 18 138/98 100 %      Temp Source Heart Rate Source Patient Position - Orthostatic VS BP Location FiO2 (%)   Oral Monitor Sitting Left arm --      Pain Score       --           Vitals:    06/14/23 1538   BP: 138/98   Pulse: 80   Patient Position - Orthostatic VS: Sitting         Visual Acuity      ED Medications  Medications - No data to display    Diagnostic Studies  Results Reviewed     None                 No orders to display              Procedures  Procedures         ED Course                                             MDM   Clinically with TMJ, no signs of infection at this time, patient states she has a dentist, will prescribe anti-inflammatory  Disposition  Final diagnoses:   TMJ (temporomandibular joint disorder)     Time reflects when diagnosis was documented in both MDM as applicable and the Disposition within this note     Time User Action Codes Description Comment    6/14/2023  3:57 PM Antonia Cottrell Add [Y06 [id number]] TMJ (temporomandibular joint disorder)       ED Disposition     ED Disposition   Discharge    Condition   Stable    Date/Time   Wed Jun 14, 2023  3:57 PM    [address] [facility] discharge to home/self care  Follow-up Information     Follow up With Specialties Details Why Contact Info    your dentist              Patient's Medications   Discharge Prescriptions    DICLOFENAC (VOLTAREN) 75 MG EC TABLET    Take 1 tablet (75 mg total) by mouth 2 (two) times a day       Start Date: 6/14/2023 End Date: --       Order Dose: 75 mg       Quantity: 30 tablet    Refills: 0       No discharge procedures on file      PDMP Review     None          ED Provider  Electronically Signed by           Melani Edwards MD  06/14/23 7898

## 2023-06-24 ENCOUNTER — HOSPITAL ENCOUNTER (EMERGENCY)
Facility: HOSPITAL | Age: 38
Discharge: HOME | End: 2023-06-24
Attending: EMERGENCY MEDICINE | Admitting: EMERGENCY MEDICINE
Payer: COMMERCIAL

## 2023-06-24 VITALS
TEMPERATURE: 97.8 F | RESPIRATION RATE: 18 BRPM | DIASTOLIC BLOOD PRESSURE: 74 MMHG | HEART RATE: 87 BPM | SYSTOLIC BLOOD PRESSURE: 135 MMHG | OXYGEN SATURATION: 100 %

## 2023-06-24 DIAGNOSIS — N93.9 VAGINAL BLEEDING: Primary | ICD-10-CM

## 2023-06-24 LAB
APTT PPP: 29 SECONDS (ref 23–37)
BACTERIA UR QL AUTO: ABNORMAL /HPF
BASOPHILS # BLD AUTO: 0.03 THOUSANDS/ÂΜL (ref 0–0.1)
BASOPHILS NFR BLD AUTO: 0 % (ref 0–1)
BILIRUB UR QL STRIP: NEGATIVE
CLARITY UR: ABNORMAL
COLOR UR: ABNORMAL
EOSINOPHIL # BLD AUTO: 0.11 THOUSAND/ÂΜL (ref 0–0.61)
EOSINOPHIL NFR BLD AUTO: 1 % (ref 0–6)
ERYTHROCYTE [DISTWIDTH] IN BLOOD BY AUTOMATED COUNT: 15.7 % (ref 11.6–15.1)
EXT PREGNANCY TEST URINE: NEGATIVE
EXT. CONTROL: NORMAL
GLUCOSE UR STRIP-MCNC: NEGATIVE MG/DL
GRAN CASTS #/AREA URNS LPF: ABNORMAL /[LPF]
HCG SERPL QL: NEGATIVE
HCT VFR BLD AUTO: 35 % (ref 34.8–46.1)
HGB BLD-MCNC: 11.2 G/DL (ref 11.5–15.4)
HGB UR QL STRIP.AUTO: ABNORMAL
IMM GRANULOCYTES # BLD AUTO: 0.03 THOUSAND/UL (ref 0–0.2)
IMM GRANULOCYTES NFR BLD AUTO: 0 % (ref 0–2)
INR PPP: 1.05 (ref 0.84–1.19)
KETONES UR STRIP-MCNC: NEGATIVE MG/DL
LEUKOCYTE ESTERASE UR QL STRIP: ABNORMAL
LYMPHOCYTES # BLD AUTO: 1.95 THOUSANDS/ÂΜL (ref 0.6–4.47)
LYMPHOCYTES NFR BLD AUTO: 21 % (ref 14–44)
MCH RBC QN AUTO: 25.3 PG (ref 26.8–34.3)
MCHC RBC AUTO-ENTMCNC: 32 G/DL (ref 31.4–37.4)
MCV RBC AUTO: 79 FL (ref 82–98)
MONOCYTES # BLD AUTO: 1.05 THOUSAND/ÂΜL (ref 0.17–1.22)
MONOCYTES NFR BLD AUTO: 11 % (ref 4–12)
NEUTROPHILS # BLD AUTO: 6.14 THOUSANDS/ÂΜL (ref 1.85–7.62)
NEUTS SEG NFR BLD AUTO: 67 % (ref 43–75)
NITRITE UR QL STRIP: NEGATIVE
NON-SQ EPI CELLS URNS QL MICRO: ABNORMAL /HPF
NRBC BLD AUTO-RTO: 0 /100 WBCS
PH UR STRIP.AUTO: 5.5 [PH]
PLATELET # BLD AUTO: 355 THOUSANDS/UL (ref 149–390)
PMV BLD AUTO: 10.7 FL (ref 8.9–12.7)
PROT UR STRIP-MCNC: ABNORMAL MG/DL
PROTHROMBIN TIME: 13.5 SECONDS (ref 11.6–14.5)
RBC # BLD AUTO: 4.43 MILLION/UL (ref 3.81–5.12)
RBC #/AREA URNS AUTO: ABNORMAL /HPF
SP GR UR STRIP.AUTO: 1.01 (ref 1–1.03)
UROBILINOGEN UR STRIP-ACNC: <2 MG/DL
WBC # BLD AUTO: 9.31 THOUSAND/UL (ref 4.31–10.16)
WBC #/AREA URNS AUTO: ABNORMAL /HPF

## 2023-06-24 PROCEDURE — 85730 THROMBOPLASTIN TIME PARTIAL: CPT | Performed by: EMERGENCY MEDICINE

## 2023-06-24 PROCEDURE — 84703 CHORIONIC GONADOTROPIN ASSAY: CPT | Performed by: EMERGENCY MEDICINE

## 2023-06-24 PROCEDURE — 85610 PROTHROMBIN TIME: CPT | Performed by: EMERGENCY MEDICINE

## 2023-06-24 PROCEDURE — 81001 URINALYSIS AUTO W/SCOPE: CPT | Performed by: EMERGENCY MEDICINE

## 2023-06-24 PROCEDURE — 81025 URINE PREGNANCY TEST: CPT | Performed by: EMERGENCY MEDICINE

## 2023-06-24 PROCEDURE — 99283 EMERGENCY DEPT VISIT LOW MDM: CPT

## 2023-06-24 PROCEDURE — 36415 COLL VENOUS BLD VENIPUNCTURE: CPT | Performed by: EMERGENCY MEDICINE

## 2023-06-24 PROCEDURE — 85025 COMPLETE CBC W/AUTO DIFF WBC: CPT | Performed by: EMERGENCY MEDICINE

## 2023-06-24 NOTE — ED PROVIDER NOTES
"History  Chief Complaint   Patient presents with   • Vaginal Bleeding     Patient c/o \"unusual \" dark red vaginal bleeding ,small clots and lower back pain x 1 week  Vaginal bleeding since yesterday  Associated lower abdominal cramping  No fever  No syncope  Not pregnant  No abdominal trauma  Not on thinners  No weakness or fatigue  Prior to Admission Medications   Prescriptions Last Dose Informant Patient Reported? Taking? EPINEPHrine (EPIPEN) 0 3 mg/0 3 mL SOAJ   No No   Sig: Inject 0 3 mL into the shoulder, thigh, or buttocks once for 1 dose If needed for allergic reaction  Proceed immediately to ED if you use the EpiPen     HYDROcodone-acetaminophen (NORCO) 5-325 mg per tablet  Self No No   Sig: Take 1 tablet by mouth every 6 (six) hours as needed (Not relieved by Anti-inflammatory) for up to 12 dosesMax Daily Amount: 4 tablets   Patient not taking: Reported on 10/2/2020   LORazepam (ATIVAN) 1 mg tablet   No No   Sig: Take 1 tablet (1 mg total) by mouth 3 (three) times a day as needed for anxiety for up to 5 days   Magnesium Hydroxide (MAGNESIA PO)  Self Yes No   Sig: Take by mouth   Melatonin 10 MG CAPS  Self Yes No   Sig: TAKE 1 CAPSULE BY MOUTH EVERYDAY AT BEDTIME   baclofen 10 mg tablet  Self No No   Sig: Take 1 tablet (10 mg total) by mouth 3 (three) times a day as needed for muscle spasms   diazepam (VALIUM) 5 mg tablet  Self No No   Sig: Take 1 tablet (5 mg total) by mouth every 8 (eight) hours as needed for anxiety   diclofenac (VOLTAREN) 75 mg EC tablet   No No   Sig: Take 1 tablet (75 mg total) by mouth 2 (two) times a day   diclofenac sodium (VOLTAREN) 1 %  Self No No   Sig: Apply 2 g topically 4 (four) times a day as needed (pain)   Patient not taking: Reported on 10/2/2020   dicyclomine (BENTYL) 20 mg tablet   No No   Sig: Take 1 tablet (20 mg total) by mouth 2 (two) times a day for 3 days   etanercept (ENBREL) 50 mg/mL SOSY  Self Yes No   Sig: Inject under the skin once a week " Patient not taking: Reported on 6/12/2023   hydrOXYzine HCL (ATARAX) 25 mg tablet  Self Yes No   Sig: Take 25 mg by mouth 3 (three) times a day   ibuprofen (MOTRIN) 800 mg tablet  Self No No   Sig: Take 1 tablet (800 mg total) by mouth every 8 (eight) hours as needed for moderate pain   ibuprofen (MOTRIN) 800 mg tablet  Self No No   Sig: Take 1 tablet (800 mg total) by mouth 3 (three) times a day   Patient not taking: Reported on 5/16/2023   ketorolac (TORADOL) 10 mg tablet  Self No No   Sig: Take 1 tablet (10 mg total) by mouth 2 (two) times a day as needed for moderate pain   magnesium oxide (MAG-OX) 400 mg   No No   Sig: Take 1 tablet (400 mg total) by mouth 2 (two) times a day for 14 days   methocarbamol (ROBAXIN) 500 mg tablet  Self No No   Sig: Take 1 tablet (500 mg total) by mouth 3 (three) times a day as needed for muscle spasms   Patient not taking: Reported on 5/16/2023   methocarbamol (ROBAXIN) 500 mg tablet  Self No No   Sig: Take 1 tablet (500 mg total) by mouth 2 (two) times a day   Patient not taking: Reported on 5/16/2023   methylPREDNISolone (MEDROL) 4 MG tablet therapy pack  Self No No   Sig: Use as directed on package   Patient not taking: Reported on 6/12/2023   oxyCODONE-acetaminophen (PERCOCET) 5-325 mg per tablet  Self No No   Sig: Take 1 tablet by mouth every 8 (eight) hours as needed for moderate painMax Daily Amount: 3 tablets   Patient not taking: Reported on 6/12/2023   predniSONE 20 mg tablet  Self No No   Sig: Take 2 tablets (40 mg total) by mouth daily   Patient not taking: Reported on 5/16/2023      Facility-Administered Medications: None       Past Medical History:   Diagnosis Date   • Anxiety    • Arthritis    • Asthma    • Fibromyalgia    • Panic attack    • Rheumatoid arthritis ([facility] [address] )        Past Surgical History:   Procedure Laterality Date   • CARPAL TUNNEL RELEASE Right    • TUBAL LIGATION     • TUBAL LIGATION         No family history on file    I have reviewed and agree with the history as documented  E-Cigarette/Vaping   • E-Cigarette Use Never User      E-Cigarette/Vaping Substances     Social History     Tobacco Use   • Smoking status: Never   • Smokeless tobacco: Never   Vaping Use   • Vaping Use: Never used   Substance Use Topics   • Alcohol use: No   • Drug use: No       Review of Systems   Genitourinary: Positive for vaginal bleeding  Physical Exam  Physical Exam  Vitals and nursing note reviewed  Constitutional:       General: She is not in acute distress  Appearance: Normal appearance  She is well-developed  She is not ill-appearing, toxic-appearing or diaphoretic  HENT:      Head: Normocephalic and atraumatic  Eyes:      Conjunctiva/sclera: Conjunctivae normal       Pupils: Pupils are equal, round, and reactive to light  Neck:      Vascular: No JVD  Cardiovascular:      Rate and Rhythm: Normal rate and regular rhythm  Heart sounds: Normal heart sounds  Pulmonary:      Effort: Pulmonary effort is normal       Breath sounds: Normal breath sounds  No stridor  Abdominal:      General: Abdomen is flat  There is no distension  Palpations: Abdomen is soft  Tenderness: There is no abdominal tenderness  There is no guarding or rebound  Musculoskeletal:         General: No tenderness or deformity  Normal range of motion  Cervical back: Normal range of motion and neck supple  Skin:     General: Skin is warm and dry  Capillary Refill: Capillary refill takes less than 2 seconds  Coloration: Skin is not pale  Findings: No erythema or rash  Neurological:      General: No focal deficit present  Mental Status: She is alert and oriented to person, place, and time  Cranial Nerves: No cranial nerve deficit  Sensory: No sensory deficit  Motor: No weakness or abnormal muscle tone        Coordination: Coordination normal       Gait: Gait normal          Vital Signs  ED Triage Vitals [06/24/23 1250]   Temperature Pulse Respirations Blood Pressure SpO2   97 8 °F (36 6 °C) 87 18 135/74 100 %      Temp Source Heart Rate Source Patient Position - Orthostatic VS BP Location FiO2 (%)   Temporal Monitor Sitting Right arm --      Pain Score       5           Vitals:    06/24/23 1250   BP: 135/74   Pulse: 87   Patient Position - Orthostatic VS: Sitting         Visual Acuity      ED Medications  Medications - No data to display    Diagnostic Studies  Results Reviewed     Procedure Component Value Units Date/Time    hCG, qualitative pregnancy [[id number]]  (Normal) Collected: 06/24/23 1342    Lab Status: Final result Specimen: Blood from Arm, Right Updated: 06/24/23 1434     Preg, Serum Negative    Urine Microscopic [[id number]]  (Abnormal) Collected: 06/24/23 1343    Lab Status: Final result Specimen: Urine, Clean Catch Updated: 06/24/23 1414     RBC, UA Innumerable /hpf      WBC, UA 4-10 /hpf      Epithelial Cells Occasional /hpf      Bacteria, UA Occasional /hpf      Granular Casts, UA 0-3    UA (URINE) with reflex to Scope [[id number]]  (Abnormal) Collected: 06/24/23 1343    Lab Status: Final result Specimen: Urine, Clean Catch Updated: 06/24/23 1405     Color, UA Brown     Clarity, UA Turbid     Specific Gravity, UA 1 009     pH, UA 5 5     Leukocytes, UA Small     Nitrite, UA Negative     Protein, UA 70 (1+) mg/dl      Glucose, UA Negative mg/dl      Ketones, UA Negative mg/dl      Urobilinogen, UA <2 0 mg/dl      Bilirubin, UA Negative     Occult Blood, UA Large    Protime-INR [[id number]]  (Normal) Collected: 06/24/23 1342    Lab Status: Final result Specimen: Blood from Arm, Right Updated: 06/24/23 1403     Protime 13 5 seconds      INR 1 05    APTT [[id number]]  (Normal) Collected: 06/24/23 1342    Lab Status: Final result Specimen: Blood from Arm, Right Updated: 06/24/23 1403     PTT 29 seconds     CBC and differential [[id number]]  (Abnormal) Collected: 06/24/23 1342    Lab Status: Final result Specimen: Blood from Arm, Right Updated: 06/24/23 1349     WBC 9 31 Thousand/uL      RBC 4 43 Million/uL      Hemoglobin 11 2 g/dL      Hematocrit 35 0 %      MCV 79 fL      MCH 25 3 pg      MCHC 32 0 g/dL      RDW 15 7 %      MPV 10 7 fL      Platelets 219 Thousands/uL      nRBC 0 /100 WBCs      Neutrophils Relative 67 %      Immat GRANS % 0 %      Lymphocytes Relative 21 %      Monocytes Relative 11 %      Eosinophils Relative 1 %      Basophils Relative 0 %      Neutrophils Absolute 6 14 Thousands/µL      Immature Grans Absolute 0 03 Thousand/uL      Lymphocytes Absolute 1 95 Thousands/µL      Monocytes Absolute 1 05 Thousand/µL      Eosinophils Absolute 0 11 Thousand/µL      Basophils Absolute 0 03 Thousands/µL     POCT pregnancy, urine [[id number]]  (Normal) Resulted: 06/24/23 1347    Lab Status: Final result Updated: 06/24/23 1348     EXT Preg Test, Ur Negative     Control Valid                 No orders to display              Procedures  Procedures         ED Course                               SBIRT 22yo+    Flowsheet Row Most Recent Value   Initial Alcohol Screen: US AUDIT-C     1  How often do you have a drink containing alcohol? 0 Filed at: 06/24/2023 1252   2  How many drinks containing alcohol do you have on a typical day you are drinking? 0 Filed at: 06/24/2023 1252   3a  Male UNDER 65: How often do you have five or more drinks on one occasion? 0 Filed at: 06/24/2023 1252   3b  FEMALE Any Age, or MALE 65+: How often do you have 4 or more drinks on one occassion? 0 Filed at: 06/24/2023 1252   Audit-C Score 0 Filed at: 06/24/2023 1252   DREA: How many times in the past year have you    Used an illegal drug or used a prescription medication for non-medical reasons?  Never Filed at: 06/24/2023 1252                    MDM    Disposition  Final diagnoses:   Vaginal bleeding     Time reflects when diagnosis was documented in both MDM as applicable and the Disposition within this note     Time User Action Codes Description Comment 6/24/2023  2:20 PM Ok Mirella Add [N93 9] Vaginal bleeding       ED Disposition     ED Disposition   Discharge    Condition   Stable    Date/Time   Sat Jun 24, 2023  2:20 PM    [address] [place] [address] discharge to home/self care  Follow-up Information    None         Discharge Medication List as of 6/24/2023  2:20 PM      CONTINUE these medications which have NOT CHANGED    Details   baclofen 10 mg tablet Take 1 tablet (10 mg total) by mouth 3 (three) times a day as needed for muscle spasms, Starting Tue 5/16/2023, Normal      diazepam (VALIUM) 5 mg tablet Take 1 tablet (5 mg total) by mouth every 8 (eight) hours as needed for anxiety, Starting Fri 5/12/2023, Print      diclofenac (VOLTAREN) 75 mg EC tablet Take 1 tablet (75 mg total) by mouth 2 (two) times a day, Starting Wed 6/14/2023, Normal      diclofenac sodium (VOLTAREN) 1 % Apply 2 g topically 4 (four) times a day as needed (pain), Starting Tue 12/3/2019, Print      dicyclomine (BENTYL) 20 mg tablet Take 1 tablet (20 mg total) by mouth 2 (two) times a day for 3 days, Starting Sat 5/11/2019, Until Sat 11/16/2019, Print      EPINEPHrine (EPIPEN) 0 3 mg/0 3 mL SOAJ Inject 0 3 mL into the shoulder, thigh, or buttocks once for 1 dose If needed for allergic reaction    Proceed immediately to ED if you use the EpiPen , Starting Mon 8/21/2017, Print      etanercept (ENBREL) 50 mg/mL SOSY Inject under the skin once a week, Historical Med      HYDROcodone-acetaminophen (NORCO) 5-325 mg per tablet Take 1 tablet by mouth every 6 (six) hours as needed (Not relieved by Anti-inflammatory) for up to 12 dosesMax Daily Amount: 4 tablets, Starting Fri 9/6/2019, Print      hydrOXYzine HCL (ATARAX) 25 mg tablet Take 25 mg by mouth 3 (three) times a day, Historical Med      !! ibuprofen (MOTRIN) 800 mg tablet Take 1 tablet (800 mg total) by mouth every 8 (eight) hours as needed for moderate pain, Starting Wed 7/14/2021, Normal      !! ibuprofen (MOTRIN) 800 mg tablet Take 1 tablet (800 mg total) by mouth 3 (three) times a day, Starting Sat 12/10/2022, Normal      ketorolac (TORADOL) 10 mg tablet Take 1 tablet (10 mg total) by mouth 2 (two) times a day as needed for moderate pain, Starting Tue 5/16/2023, Normal      LORazepam (ATIVAN) 1 mg tablet Take 1 tablet (1 mg total) by mouth 3 (three) times a day as needed for anxiety for up to 5 days, Starting Sat 12/8/2018, Until Sat 11/16/2019, Print      Magnesium Hydroxide (MAGNESIA PO) Take by mouth, Historical Med      magnesium oxide (MAG-OX) 400 mg Take 1 tablet (400 mg total) by mouth 2 (two) times a day for 14 days, Starting Fri 10/2/2020, Until Fri 10/16/2020, Normal      Melatonin 10 MG CAPS TAKE 1 CAPSULE BY MOUTH EVERYDAY AT BEDTIME, Historical Med      !! methocarbamol (ROBAXIN) 500 mg tablet Take 1 tablet (500 mg total) by mouth 3 (three) times a day as needed for muscle spasms, Starting Wed 7/14/2021, Normal      !! methocarbamol (ROBAXIN) 500 mg tablet Take 1 tablet (500 mg total) by mouth 2 (two) times a day, Starting Sat 12/10/2022, Normal      methylPREDNISolone (MEDROL) 4 MG tablet therapy pack Use as directed on package, Print      oxyCODONE-acetaminophen (PERCOCET) 5-325 mg per tablet Take 1 tablet by mouth every 8 (eight) hours as needed for moderate painMax Daily Amount: 3 tablets, Starting Mon 2/24/2020, Normal      predniSONE 20 mg tablet Take 2 tablets (40 mg total) by mouth daily, Starting Mon 7/13/2020, Print       !! - Potential duplicate medications found  Please discuss with provider  No discharge procedures on file      PDMP Review     None          ED Provider  Electronically Signed by           Akhil Pinzon MD  06/24/23 7780

## 2023-07-17 ENCOUNTER — EVALUATION (OUTPATIENT)
Dept: PHYSICAL THERAPY | Facility: CLINIC | Age: 38
End: 2023-07-17
Payer: COMMERCIAL

## 2023-07-17 DIAGNOSIS — M75.41 SUBACROMIAL IMPINGEMENT OF RIGHT SHOULDER: ICD-10-CM

## 2023-07-17 DIAGNOSIS — M62.838 TRAPEZIUS MUSCLE SPASM: Primary | ICD-10-CM

## 2023-07-17 DIAGNOSIS — S16.1XXD CERVICAL STRAIN, SUBSEQUENT ENCOUNTER: ICD-10-CM

## 2023-07-17 DIAGNOSIS — M75.51 SUBACROMIAL BURSITIS OF RIGHT SHOULDER JOINT: ICD-10-CM

## 2023-07-17 PROCEDURE — 97110 THERAPEUTIC EXERCISES: CPT

## 2023-07-17 PROCEDURE — 97161 PT EVAL LOW COMPLEX 20 MIN: CPT

## 2023-07-17 NOTE — PROGRESS NOTES
PT Evaluation     Today's date: 2023  Patient name: Carol Rutherford  : 1985  MRN: [id number]  Referring provider: Yfn Dee  Dx:   Encounter Diagnosis     ICD-10-CM    1. Trapezius muscle spasm  M62.838 Ambulatory Referral to Physical Therapy      2. Subacromial bursitis of right shoulder joint  M75.51 Ambulatory Referral to Physical Therapy      3. Cervical strain, subsequent encounter  S16. 1XXD Ambulatory Referral to Physical Therapy      4. Subacromial impingement of right shoulder  M75.41 Ambulatory Referral to Physical Therapy          Start Time: 1600  Stop Time: 5974  Total time in clinic (min): 47 minutes    Assessment  Assessment details: Pt is a 34 y.o. female presenting to PT services with c/o cervical pain and b/l shoulder pain. PT suspects soft tissue involvement evidenced by palpable tension and trigger points in b/l upper trapezius muscles, TTP along all pericervical musculature, and pain with resisted movements. Pt has limited and painful cervical AROM. Pt has strong, but painful, b/l UE. PT added UT stretch, levator scapulae stretch, and anterior scalene stretch to pt's HEP, pt verbalized and demonstrated understanding of proper form. Pt is a good candidate for skilled physical therapy in order to improve b/l shoulder and cervical mobility, decrease pain, decrease TTP, reduce trigger points, and improve functional tolerance. Impairments: abnormal or restricted ROM, activity intolerance, impaired physical strength, lacks appropriate home exercise program and pain with function    Goals  STG (5 weeks):  1. Pt will improve b/l cervical lateral flexion to be at least 35*  2. Pt will have pain free shoulder AROM  3. Pt will report pain at worst as 9/10    LTG (10 weeks):  1. Pt will be independent in HEP  2. Pt will improve cervical AROM to be pain free   3. Pt will report pain at worst as 6/10 or less   4.  Pt will have no palpable trigger points in b/l UT    Plan  Patient would benefit from: skilled physical therapy  Planned modality interventions: biofeedback, cryotherapy, TENS, thermotherapy: hydrocollator packs and unattended electrical stimulation  Planned therapy interventions: IASTM, joint mobilization, kinesiology taping, manual therapy, massage, Farmer taping, abdominal trunk stabilization, balance, nerve gliding, neuromuscular re-education, patient education, postural training, strengthening, stretching, therapeutic activities, therapeutic exercise, functional ROM exercises, flexibility and home exercise program  Frequency: 1x week  Duration in weeks: 10  Plan of Care beginning date: 2023  Plan of Care expiration date: 2023  Treatment plan discussed with: patient and family        Subjective Evaluation    History of Present Illness  Mechanism of injury: She reports that the pain started since before last December. She reports that the pain runs from the shoulder up to the head, and she gets a lot of tension in her shoulders and clavicle and jaw. She states she has tried chiropractor and that did not help. She states that they tried an injection in her R shoulder and it helped in the shoulder but she still has pain in her UT. Pt reports that she has always had trouble sleeping. She sleeps with 2 pillows. Patient Goals  Patient goals for therapy: improved balance, increased motion, decreased pain and increased strength  Patient goal: "to get relief of pain, better movement."   Pain  Current pain rating: 10  At best pain ratin  Pain scale at highest: 10+  Location: R shoulder, R UT, R levator scap, L sided headache   Quality: tight, sharp and pressure  Relieving factors: heat and relaxation (pressure on suboccipitals, massage)  Exacerbated by: random.     Social Support  Steps to enter house: yes (13 steps, bilateral hand rails)  Stairs in house: yes (1 flight,1 hand rail)   Lives in: multiple-level home  Lives with: 3 kids (24, 16, 16) Employment status: not working  Hand dominance: right      Diagnostic Tests  X-ray: normal  MRI studies: normal  Treatments  Previous treatment: physical therapy and injection treatment        Objective     Palpation   Left   Trigger point to scalenes, suboccipitals and upper trapezius. Right   Trigger point to scalenes, suboccipitals and upper trapezius. Active Range of Motion   Cervical/Thoracic Spine       Cervical    Flexion:  WFL and with pain  Extension:  WFL and with pain  Left lateral flexion: 25 degrees     with pain  Right lateral flexion: 13 degrees     WFL  Left Shoulder   Flexion: 160 degrees with pain  Abduction: 165 degrees with pain  External rotation BTH: T1   Internal rotation BTB: T8 with pain    Right Shoulder   Flexion: 160 degrees with pain  Abduction: 165 degrees with pain  External rotation BTH: T1   Internal rotation BTB: T8 with pain  Mechanical Assessment    Cervical    Seated Protrusion: repeated movements   Pain intensity: worse  Seated retraction: repeated movements   Pain location: no change    Thoracic      Lumbar      Strength/Myotome Testing     Left Shoulder     Planes of Motion   Flexion: 4+ (pain)   Abduction: 4+ (pain)   External rotation at 0°: 5 (pain)   Internal rotation at 0°: 5     Right Shoulder     Planes of Motion   Flexion: 4+ (pain)   Abduction: 4+ (pain)   External rotation at 0°: 5 (pain)   Internal rotation at 0°: 5              Precautions: Fibromyalgia, anxiety, arthritis, RA  Access Code: [email or web address]    POC expires Auth Status Unit limit Start date  Expiration date PT/OT + Visit Limit?    9/29/23 Required - after 24th v BOMN 7/17/23 12/31/23 BOMN                                           Date 7/16            Visit # 1            Re-Eval             FOTO             Manuals                                                                 Neuro Re-Ed                                                                                                        Ther Ex             UBE             UT/LS/AS stretch 10"x10                                                                                          Ther Activity                                       Gait Training                                       Modalities

## 2023-07-24 ENCOUNTER — OFFICE VISIT (OUTPATIENT)
Dept: PHYSICAL THERAPY | Facility: CLINIC | Age: 38
End: 2023-07-24
Payer: COMMERCIAL

## 2023-07-24 DIAGNOSIS — M62.838 TRAPEZIUS MUSCLE SPASM: Primary | ICD-10-CM

## 2023-07-24 DIAGNOSIS — M75.41 SUBACROMIAL IMPINGEMENT OF RIGHT SHOULDER: ICD-10-CM

## 2023-07-24 DIAGNOSIS — M75.51 SUBACROMIAL BURSITIS OF RIGHT SHOULDER JOINT: ICD-10-CM

## 2023-07-24 DIAGNOSIS — S16.1XXD CERVICAL STRAIN, SUBSEQUENT ENCOUNTER: ICD-10-CM

## 2023-07-24 PROCEDURE — 97110 THERAPEUTIC EXERCISES: CPT

## 2023-07-24 PROCEDURE — 97140 MANUAL THERAPY 1/> REGIONS: CPT

## 2023-07-24 NOTE — PROGRESS NOTES
Daily Note     Today's date: 2023  Patient name: Sofia Mejia  : 1985  MRN: [id number]  Referring provider: Shahida Brower  Dx:   Encounter Diagnosis     ICD-10-CM    1. Trapezius muscle spasm  M62.838       2. Subacromial bursitis of right shoulder joint  M75.51       3. Cervical strain, subsequent encounter  S16. 1XXD       4. Subacromial impingement of right shoulder  M75.41                      Subjective: Pt reports she is in a lot of total body pain secondary to RA. Pt reports the stretches have been somewhat helpful to manage her sx. Objective: See treatment diary below      Assessment: Hypertonic in b/l UTs with TTP. Able to tolerate gentle pressure during STM. Focused on STM and gentle ROM. Patient with improved tissue quality in SOR following manual therapy. Patient would benefit from continued PT in order to address soft tissue restriction and flexibility for decreased pain during daily activities. Plan: Continue per plan of care. Precautions: Fibromyalgia, anxiety, arthritis, RA  Access Code: [email or web address]    POC expires Auth Status Unit limit Start date  Expiration date PT/OT + Visit Limit?    23 Required - after  v Doug Linnea 23 BOMN                                           Date            Visit # 1 2           Re-Eval             FOTO             Manuals             STM B/L UT  AF           SOR  AF                                     Neuro Re-Ed                                                                                                        Ther Ex             UBE  Pt defer           Pt education  AF           UT/LS/AS stretch 10"x10 10"x10 ea                                                                                         Ther Activity                                       Gait Training                                       Modalities

## 2023-07-31 ENCOUNTER — OFFICE VISIT (OUTPATIENT)
Dept: PHYSICAL THERAPY | Facility: CLINIC | Age: 38
End: 2023-07-31
Payer: COMMERCIAL

## 2023-07-31 DIAGNOSIS — M75.51 SUBACROMIAL BURSITIS OF RIGHT SHOULDER JOINT: ICD-10-CM

## 2023-07-31 DIAGNOSIS — M75.41 SUBACROMIAL IMPINGEMENT OF RIGHT SHOULDER: ICD-10-CM

## 2023-07-31 DIAGNOSIS — S16.1XXD CERVICAL STRAIN, SUBSEQUENT ENCOUNTER: ICD-10-CM

## 2023-07-31 DIAGNOSIS — M62.838 TRAPEZIUS MUSCLE SPASM: Primary | ICD-10-CM

## 2023-07-31 PROCEDURE — 97110 THERAPEUTIC EXERCISES: CPT

## 2023-07-31 PROCEDURE — 97140 MANUAL THERAPY 1/> REGIONS: CPT

## 2023-07-31 NOTE — PROGRESS NOTES
Daily Note     Today's date: 2023  Patient name: Hector Maddox  : 1985  MRN: [id number]  Referring provider: Mikie Lundy  Dx:   Encounter Diagnosis     ICD-10-CM    1. Trapezius muscle spasm  M62.838       2. Subacromial bursitis of right shoulder joint  M75.51       3. Cervical strain, subsequent encounter  S16. 1XXD       4. Subacromial impingement of right shoulder  M75.41                      Subjective: Pt states she is feeling better since receiving infusion last week. She reports continued neck pain, primarily in BUTs and mid-lower cervical region b/l. Objective: See treatment diary below      Assessment: TTP in suboccipitals. Patient with improvement in UT tone, however TTP and trigger points remain. Patient tolerates non-WB cervical exercises without exacerbation of sx. Pt would benefit from continued graded exercise and manual therapy in order to reduce overall pain levels and improve function. Plan: Continue per plan of care. Precautions: Fibromyalgia, anxiety, arthritis, RA  Access Code: [email or web address]    POC expires Auth Status Unit limit Start date  Expiration date PT/OT + Visit Limit?    23 Required - after  Viktoria Castillo 23 Viktoria Castillo                                           Date           Visit # 1 2 3          Re-Eval             FOTO             Manuals             STM B/L UT  AF AF          SOR  AF AF                                    Neuro Re-Ed             scap retraction   2x10                                                                                        Ther Ex             UBE  Pt defer           Pt education  AF AF          UT/LS/AS stretch 10"x10 10"x10 ea           Supine c/s rotation   x10          Supine chin tuck   2x10                                                              Ther Activity                                       Gait Training                                       Modalities

## 2023-08-01 ENCOUNTER — HOSPITAL ENCOUNTER (EMERGENCY)
Facility: HOSPITAL | Age: 38
Discharge: HOME | End: 2023-08-01
Attending: EMERGENCY MEDICINE
Payer: COMMERCIAL

## 2023-08-01 VITALS
SYSTOLIC BLOOD PRESSURE: 139 MMHG | DIASTOLIC BLOOD PRESSURE: 97 MMHG | OXYGEN SATURATION: 98 % | RESPIRATION RATE: 18 BRPM | HEART RATE: 91 BPM

## 2023-08-01 DIAGNOSIS — T63.441A BEE STING, ACCIDENTAL OR UNINTENTIONAL, INITIAL ENCOUNTER: Primary | ICD-10-CM

## 2023-08-01 PROCEDURE — 99282 EMERGENCY DEPT VISIT SF MDM: CPT

## 2023-08-01 PROCEDURE — 99284 EMERGENCY DEPT VISIT MOD MDM: CPT | Performed by: EMERGENCY MEDICINE

## 2023-08-01 RX ORDER — ACETAMINOPHEN 325 MG/1
650 TABLET ORAL ONCE
Status: COMPLETED | OUTPATIENT
Start: 2023-08-01 | End: 2023-08-01

## 2023-08-01 RX ORDER — NAPROXEN 250 MG/1
500 TABLET ORAL ONCE
Status: COMPLETED | OUTPATIENT
Start: 2023-08-01 | End: 2023-08-01

## 2023-08-01 RX ADMIN — NAPROXEN 500 MG: 250 TABLET ORAL at 21:32

## 2023-08-01 RX ADMIN — ACETAMINOPHEN 650 MG: 325 TABLET, FILM COATED ORAL at 21:32

## 2023-08-02 NOTE — ED PROVIDER NOTES
History  Chief Complaint   Patient presents with   • Bee Sting     Patient got stung by "at least 10 bees" after stepping on nest x 20 minutes ago. Denies allergy       34year-old patient presents to the emergency department with pain in bilateral lower legs and feet after she was stung by at least 10 bees about half hour to 40 minutes prior to presentation. She reports pain in the involved areas but denies any other associated systemic symptoms. Patient has a documented food allergy and a history of rheumatoid arthritis on immunosuppressive drugs. History provided by:  Patient   used: No        Prior to Admission Medications   Prescriptions Last Dose Informant Patient Reported? Taking? EPINEPHrine (EPIPEN) 0.3 mg/0.3 mL SOAJ   No No   Sig: Inject 0.3 mL into the shoulder, thigh, or buttocks once for 1 dose If needed for allergic reaction. Proceed immediately to ED if you use the EpiPen.    HYDROcodone-acetaminophen (NORCO) 5-325 mg per tablet  Self No No   Sig: Take 1 tablet by mouth every 6 (six) hours as needed (Not relieved by Anti-inflammatory) for up to 12 dosesMax Daily Amount: 4 tablets   Patient not taking: Reported on 10/2/2020   LORazepam (ATIVAN) 1 mg tablet   No No   Sig: Take 1 tablet (1 mg total) by mouth 3 (three) times a day as needed for anxiety for up to 5 days   Magnesium Hydroxide (MAGNESIA PO)  Self Yes No   Sig: Take by mouth   Melatonin 10 MG CAPS  Self Yes No   Sig: TAKE 1 CAPSULE BY MOUTH EVERYDAY AT BEDTIME   baclofen 10 mg tablet  Self No No   Sig: Take 1 tablet (10 mg total) by mouth 3 (three) times a day as needed for muscle spasms   diazepam (VALIUM) 5 mg tablet  Self No No   Sig: Take 1 tablet (5 mg total) by mouth every 8 (eight) hours as needed for anxiety   diclofenac (VOLTAREN) 75 mg EC tablet   No No   Sig: Take 1 tablet (75 mg total) by mouth 2 (two) times a day   diclofenac sodium (VOLTAREN) 1 %  Self No No   Sig: Apply 2 g topically 4 (four) times a day as needed (pain)   Patient not taking: Reported on 10/2/2020   dicyclomine (BENTYL) 20 mg tablet   No No   Sig: Take 1 tablet (20 mg total) by mouth 2 (two) times a day for 3 days   etanercept (ENBREL) 50 mg/mL SOSY  Self Yes No   Sig: Inject under the skin once a week   Patient not taking: Reported on 6/12/2023   hydrOXYzine HCL (ATARAX) 25 mg tablet  Self Yes No   Sig: Take 25 mg by mouth 3 (three) times a day   ibuprofen (MOTRIN) 800 mg tablet  Self No No   Sig: Take 1 tablet (800 mg total) by mouth every 8 (eight) hours as needed for moderate pain   ibuprofen (MOTRIN) 800 mg tablet  Self No No   Sig: Take 1 tablet (800 mg total) by mouth 3 (three) times a day   Patient not taking: Reported on 5/16/2023   ketorolac (TORADOL) 10 mg tablet  Self No No   Sig: Take 1 tablet (10 mg total) by mouth 2 (two) times a day as needed for moderate pain   magnesium oxide (MAG-OX) 400 mg   No No   Sig: Take 1 tablet (400 mg total) by mouth 2 (two) times a day for 14 days   methocarbamol (ROBAXIN) 500 mg tablet  Self No No   Sig: Take 1 tablet (500 mg total) by mouth 3 (three) times a day as needed for muscle spasms   Patient not taking: Reported on 5/16/2023   methocarbamol (ROBAXIN) 500 mg tablet  Self No No   Sig: Take 1 tablet (500 mg total) by mouth 2 (two) times a day   Patient not taking: Reported on 5/16/2023   methylPREDNISolone (MEDROL) 4 MG tablet therapy pack  Self No No   Sig: Use as directed on package   Patient not taking: Reported on 6/12/2023   oxyCODONE-acetaminophen (PERCOCET) 5-325 mg per tablet  Self No No   Sig: Take 1 tablet by mouth every 8 (eight) hours as needed for moderate painMax Daily Amount: 3 tablets   Patient not taking: Reported on 6/12/2023   predniSONE 20 mg tablet  Self No No   Sig: Take 2 tablets (40 mg total) by mouth daily   Patient not taking: Reported on 5/16/2023      Facility-Administered Medications: None       Past Medical History:   Diagnosis Date   • Anxiety    • Arthritis • Asthma    • Fibromyalgia    • Panic attack    • Rheumatoid arthritis [facility])        Past Surgical History:   Procedure Laterality Date   • CARPAL TUNNEL RELEASE Right    • TUBAL LIGATION     • TUBAL LIGATION         No family history on file. I have reviewed and agree with the history as documented. E-Cigarette/Vaping   • E-Cigarette Use Never User      E-Cigarette/Vaping Substances     Social History     Tobacco Use   • Smoking status: Never   • Smokeless tobacco: Never   Vaping Use   • Vaping Use: Never used   Substance Use Topics   • Alcohol use: No   • Drug use: No       Review of Systems   HENT: Negative for drooling, trouble swallowing and voice change. Respiratory: Negative for shortness of breath. Musculoskeletal: Positive for arthralgias and myalgias. Skin: Negative for rash. Neurological: Negative for syncope and light-headedness. All other systems reviewed and are negative. Physical Exam  Physical Exam  Vitals and nursing note reviewed. Constitutional:       General: She is not in acute distress. Appearance: She is well-developed. She is not ill-appearing or toxic-appearing. HENT:      Head: Normocephalic and atraumatic. Nose: No rhinorrhea. Mouth/Throat:      Mouth: Mucous membranes are moist.      Pharynx: Oropharynx is clear. Eyes:      Conjunctiva/sclera: Conjunctivae normal.   Cardiovascular:      Rate and Rhythm: Normal rate and regular rhythm. Pulses: Normal pulses. Heart sounds: No murmur heard. Pulmonary:      Effort: Pulmonary effort is normal. No respiratory distress. Breath sounds: Normal breath sounds. Abdominal:      Palpations: Abdomen is soft. Tenderness: There is no abdominal tenderness. Musculoskeletal:         General: No swelling. Cervical back: Neck supple. Skin:     General: Skin is warm and dry. Capillary Refill: Capillary refill takes less than 2 seconds.    Neurological:      General: No focal deficit present. Mental Status: She is alert and oriented to person, place, and time. Mental status is at baseline. Sensory: No sensory deficit. Motor: No weakness. Psychiatric:         Mood and Affect: Mood normal.         Vital Signs  ED Triage Vitals   Temp Pulse Respirations Blood Pressure SpO2   -- 08/01/23 2058 08/01/23 2058 08/01/23 2058 08/01/23 2058    91 18 139/97 98 %      Temp src Heart Rate Source Patient Position - Orthostatic VS BP Location FiO2 (%)   -- -- -- -- --             Pain Score       08/01/23 2132       10 - Worst Possible Pain           Vitals:    08/01/23 2058   BP: 139/97   Pulse: 91         Visual Acuity      ED Medications  Medications   naproxen (NAPROSYN) tablet 500 mg (500 mg Oral Given 8/1/23 2132)   acetaminophen (TYLENOL) tablet 650 mg (650 mg Oral Given 8/1/23 2132)       Diagnostic Studies  Results Reviewed     None                 No orders to display              Procedures  Procedures         ED Course                               SBIRT 20yo+    Flowsheet Row Most Recent Value   Initial Alcohol Screen: US AUDIT-C     1. How often do you have a drink containing alcohol? 0 Filed at: 08/01/2023 2100   2. How many drinks containing alcohol do you have on a typical day you are drinking? 0 Filed at: 08/01/2023 2100   3b. FEMALE Any Age, or MALE 65+: How often do you have 4 or more drinks on one occassion? 0 Filed at: 08/01/2023 2100   Audit-C Score 0 Filed at: 08/01/2023 2100   DREA: How many times in the past year have you. .. Used an illegal drug or used a prescription medication for non-medical reasons? Never Filed at: 08/01/2023 2100                    Medical Decision Making  40-year-old female presents for evaluation after being stung by multiple bees. She is hemodynamically stable and is showing no signs of systemic distress. Physical examination is benign. She has no respiratory complaints, wheezing, throat closing or drooling.   Patient is provided with analgesia for the pain associated with her stings. At this time she would like to be discharged home. I have provided her with instructions regarding bee stings and strict return precautions. Risk  OTC drugs. Prescription drug management. Disposition  Final diagnoses:   Bee sting, accidental or unintentional, initial encounter     Time reflects when diagnosis was documented in both MDM as applicable and the Disposition within this note     Time User Action Codes Description Comment    8/1/2023 10:01 PM Sravani Peters Add [K62.493U] Bee sting, accidental or unintentional, initial encounter       ED Disposition     ED Disposition   Discharge    Condition   Stable    Date/Time   Tue Aug 1, 2023 10:01 PM    [address] discharge to home/self care. Follow-up Information     Follow up With Specialties Details Why Contact Info    your primary doctor  Call in 1 day            Patient's Medications   Discharge Prescriptions    No medications on file       No discharge procedures on file.     PDMP Review     None          ED Provider  Electronically Signed by           Dylon Thomas MD  08/01/23 9879

## 2023-08-07 ENCOUNTER — OFFICE VISIT (OUTPATIENT)
Dept: PHYSICAL THERAPY | Facility: CLINIC | Age: 38
End: 2023-08-07
Payer: COMMERCIAL

## 2023-08-07 DIAGNOSIS — M75.51 SUBACROMIAL BURSITIS OF RIGHT SHOULDER JOINT: ICD-10-CM

## 2023-08-07 DIAGNOSIS — M62.838 TRAPEZIUS MUSCLE SPASM: Primary | ICD-10-CM

## 2023-08-07 DIAGNOSIS — M75.41 SUBACROMIAL IMPINGEMENT OF RIGHT SHOULDER: ICD-10-CM

## 2023-08-07 DIAGNOSIS — S16.1XXD CERVICAL STRAIN, SUBSEQUENT ENCOUNTER: ICD-10-CM

## 2023-08-07 PROCEDURE — 97110 THERAPEUTIC EXERCISES: CPT

## 2023-08-07 PROCEDURE — 97140 MANUAL THERAPY 1/> REGIONS: CPT

## 2023-08-07 NOTE — PROGRESS NOTES
Daily Note     Today's date: 2023  Patient name: Luis Hutchinson  : 1985  MRN: [id number]  Referring provider: Claudetta Rowan  Dx:   Encounter Diagnosis     ICD-10-CM    1. Trapezius muscle spasm  M62.838       2. Subacromial bursitis of right shoulder joint  M75.51       3. Cervical strain, subsequent encounter  S16. 1XXD       4. Subacromial impingement of right shoulder  M75.41           Start Time: 1150  Stop Time: 1215  Total time in clinic (min): 25 minutes    Subjective: Pt reports that she is feeling good and has less pain. Pt admits that she continues to have headaches daily. Objective: See treatment diary below      Assessment: Pt arrived 5 minutes late and requested shortened session in order to allow for time to travel to daughter's appointment in [place]. Pt continues with increased tissue tone in suboccipitals, improved with SOR and cervical traction with decreased pain. Pt is able to complete all charted interventions without increase in pain. Will monitor prolonged response NV and progress periscapular strengthening as able. PT may consider cervical mobilizations NV. Plan: Continue per plan of care. Precautions: Fibromyalgia, anxiety, arthritis, RA  Access Code: [email or web address]    POC expires Auth Status Unit limit Start date  Expiration date PT/OT + Visit Limit?    23 Required - after  v [place] Dilling 23 BOMN                                           Date          Visit # 1 2 3 4         Re-Eval             FOTO             Manuals             STM B/L UT  AF AF SC         SOR  AF AF SC         Cervical distraction    SC                      Neuro Re-Ed             scap retraction   2x10 2x10                                                                                       Ther Ex             UBE  Pt defer           Pt education  AF AF          UT/LS/AS stretch 10"x10 10"x10 ea           Supine c/s rotation   x10 2x10 Supine chin tuck   2x10 2x10                                                             Ther Activity                                       Gait Training                                       Modalities

## 2023-10-29 ENCOUNTER — HOSPITAL ENCOUNTER (EMERGENCY)
Facility: HOSPITAL | Age: 38
Discharge: HOME | End: 2023-10-29
Admitting: EMERGENCY MEDICINE
Payer: COMMERCIAL

## 2023-10-29 VITALS
DIASTOLIC BLOOD PRESSURE: 74 MMHG | OXYGEN SATURATION: 98 % | TEMPERATURE: 97.7 F | RESPIRATION RATE: 18 BRPM | HEART RATE: 92 BPM | SYSTOLIC BLOOD PRESSURE: 120 MMHG

## 2023-10-29 DIAGNOSIS — L24.3 IRRITANT CONTACT DERMATITIS DUE TO COSMETICS: Primary | ICD-10-CM

## 2023-10-29 PROCEDURE — 99284 EMERGENCY DEPT VISIT MOD MDM: CPT | Performed by: NURSE PRACTITIONER

## 2023-10-29 PROCEDURE — 99282 EMERGENCY DEPT VISIT SF MDM: CPT

## 2023-10-29 RX ORDER — PREDNISONE 20 MG/1
60 TABLET ORAL DAILY
Qty: 15 TABLET | Refills: 0 | Status: SHIPPED | OUTPATIENT
Start: 2023-10-29 | End: 2023-11-03

## 2023-10-29 NOTE — ED PROVIDER NOTES
History  Chief Complaint   Patient presents with    Rash     Pt states "I dyed my hair 4 days ago and the tint got on the back of my neck causing a rash there. Today I noticed a rash starting on my left forearm. 42-year-old female presenting here with rash over the posterior scalp nape of the neck area. The rash started to occur after she had dyed her hair. The rash is intensely itchy. There is no drainage. She has been taking Benadryl with minimal relief. Rash  Associated symptoms: no abdominal pain, no diarrhea, no fatigue, no fever, no headaches, no shortness of breath, no sore throat, not vomiting and not wheezing        Prior to Admission Medications   Prescriptions Last Dose Informant Patient Reported? Taking? EPINEPHrine (EPIPEN) 0.3 mg/0.3 mL SOAJ   No No   Sig: Inject 0.3 mL into the shoulder, thigh, or buttocks once for 1 dose If needed for allergic reaction. Proceed immediately to ED if you use the EpiPen.    HYDROcodone-acetaminophen (NORCO) 5-325 mg per tablet  Self No No   Sig: Take 1 tablet by mouth every 6 (six) hours as needed (Not relieved by Anti-inflammatory) for up to 12 dosesMax Daily Amount: 4 tablets   Patient not taking: Reported on 10/2/2020   LORazepam (ATIVAN) 1 mg tablet   No No   Sig: Take 1 tablet (1 mg total) by mouth 3 (three) times a day as needed for anxiety for up to 5 days   Magnesium Hydroxide (MAGNESIA PO)  Self Yes No   Sig: Take by mouth   Melatonin 10 MG CAPS  Self Yes No   Sig: TAKE 1 CAPSULE BY MOUTH EVERYDAY AT BEDTIME   baclofen 10 mg tablet  Self No No   Sig: Take 1 tablet (10 mg total) by mouth 3 (three) times a day as needed for muscle spasms   diazepam (VALIUM) 5 mg tablet  Self No No   Sig: Take 1 tablet (5 mg total) by mouth every 8 (eight) hours as needed for anxiety   diclofenac (VOLTAREN) 75 mg EC tablet   No No   Sig: Take 1 tablet (75 mg total) by mouth 2 (two) times a day   diclofenac sodium (VOLTAREN) 1 %  Self No No   Sig: Apply 2 g topically 4 (four) times a day as needed (pain)   Patient not taking: Reported on 10/2/2020   dicyclomine (BENTYL) 20 mg tablet   No No   Sig: Take 1 tablet (20 mg total) by mouth 2 (two) times a day for 3 days   etanercept (ENBREL) 50 mg/mL SOSY  Self Yes No   Sig: Inject under the skin once a week   Patient not taking: Reported on 6/12/2023   hydrOXYzine HCL (ATARAX) 25 mg tablet  Self Yes No   Sig: Take 25 mg by mouth 3 (three) times a day   ibuprofen (MOTRIN) 800 mg tablet  Self No No   Sig: Take 1 tablet (800 mg total) by mouth every 8 (eight) hours as needed for moderate pain   ibuprofen (MOTRIN) 800 mg tablet  Self No No   Sig: Take 1 tablet (800 mg total) by mouth 3 (three) times a day   Patient not taking: Reported on 5/16/2023   ketorolac (TORADOL) 10 mg tablet  Self No No   Sig: Take 1 tablet (10 mg total) by mouth 2 (two) times a day as needed for moderate pain   magnesium oxide (MAG-OX) 400 mg   No No   Sig: Take 1 tablet (400 mg total) by mouth 2 (two) times a day for 14 days   methocarbamol (ROBAXIN) 500 mg tablet  Self No No   Sig: Take 1 tablet (500 mg total) by mouth 3 (three) times a day as needed for muscle spasms   Patient not taking: Reported on 5/16/2023   methocarbamol (ROBAXIN) 500 mg tablet  Self No No   Sig: Take 1 tablet (500 mg total) by mouth 2 (two) times a day   Patient not taking: Reported on 5/16/2023   methylPREDNISolone (MEDROL) 4 MG tablet therapy pack  Self No No   Sig: Use as directed on package   Patient not taking: Reported on 6/12/2023   oxyCODONE-acetaminophen (PERCOCET) 5-325 mg per tablet  Self No No   Sig: Take 1 tablet by mouth every 8 (eight) hours as needed for moderate painMax Daily Amount: 3 tablets   Patient not taking: Reported on 6/12/2023   predniSONE 20 mg tablet  Self No No   Sig: Take 2 tablets (40 mg total) by mouth daily   Patient not taking: Reported on 5/16/2023      Facility-Administered Medications: None       Past Medical History:   Diagnosis Date Anxiety     Arthritis     Asthma     Fibromyalgia     Panic attack     Rheumatoid arthritis ([address])        Past Surgical History:   Procedure Laterality Date    CARPAL TUNNEL RELEASE Right     TUBAL LIGATION      TUBAL LIGATION         History reviewed. No pertinent family history. I have reviewed and agree with the history as documented. E-Cigarette/Vaping    E-Cigarette Use Never User      E-Cigarette/Vaping Substances     Social History     Tobacco Use    Smoking status: Never    Smokeless tobacco: Never   Vaping Use    Vaping Use: Never used   Substance Use Topics    Alcohol use: No    Drug use: No       Review of Systems   Constitutional:  Negative for diaphoresis, fatigue and fever. HENT:  Negative for congestion, ear pain, nosebleeds and sore throat. Eyes:  Negative for photophobia, pain, discharge and visual disturbance. Respiratory:  Negative for cough, choking, chest tightness, shortness of breath and wheezing. Cardiovascular:  Negative for chest pain and palpitations. Gastrointestinal:  Negative for abdominal distention, abdominal pain, diarrhea and vomiting. Genitourinary:  Negative for dysuria, flank pain and frequency. Musculoskeletal:  Negative for back pain, gait problem and joint swelling. Skin:  Positive for rash. Negative for color change. Neurological:  Negative for dizziness, syncope and headaches. Psychiatric/Behavioral:  Negative for behavioral problems and confusion. The patient is not nervous/anxious. All other systems reviewed and are negative. Physical Exam  Physical Exam  Vitals and nursing note reviewed. Constitutional:       General: She is not in acute distress. Appearance: She is well-developed. She is not ill-appearing or toxic-appearing. HENT:      Head: Normocephalic and atraumatic. Mouth/Throat:      Dentition: Normal dentition. Eyes:      General:         Right eye: No discharge. Left eye: No discharge.    Cardiovascular: Rate and Rhythm: Normal rate and regular rhythm. Pulmonary:      Effort: Pulmonary effort is normal. No accessory muscle usage or respiratory distress. Abdominal:      General: There is no distension. Tenderness: There is no guarding. Musculoskeletal:         General: Normal range of motion. Cervical back: Normal range of motion and neck supple. Skin:     General: Skin is warm and dry. Comments: Irritant contact dermatitis to the posterior scalp and nape of the neck   Neurological:      Mental Status: She is alert and oriented to person, place, and time. Coordination: Coordination normal.   Psychiatric:         Behavior: Behavior is cooperative. Vital Signs  ED Triage Vitals [10/29/23 1448]   Temperature Pulse Respirations Blood Pressure SpO2   97.7 °F (36.5 °C) 92 18 120/74 98 %      Temp Source Heart Rate Source Patient Position - Orthostatic VS BP Location FiO2 (%)   Temporal Monitor -- -- --      Pain Score       --           Vitals:    10/29/23 1448   BP: 120/74   Pulse: 92         Visual Acuity      ED Medications  Medications - No data to display    Diagnostic Studies  Results Reviewed       None                   No orders to display              Procedures  Procedures         ED Course                               SBIRT 22yo+      Flowsheet Row Most Recent Value   Initial Alcohol Screen: US AUDIT-C     1. How often do you have a drink containing alcohol? 0 Filed at: 10/29/2023 1448   2. How many drinks containing alcohol do you have on a typical day you are drinking? 0 Filed at: 10/29/2023 1448   3b. FEMALE Any Age, or MALE 65+: How often do you have 4 or more drinks on one occassion? 0 Filed at: 10/29/2023 1448   Audit-C Score 0 Filed at: 10/29/2023 1448   DREA: How many times in the past year have you. .. Used an illegal drug or used a prescription medication for non-medical reasons?  Never Filed at: 10/29/2023 1448                      Medical Decision Making  Moderate severe contact dermatitis involving two palmar surfaces. We will begin steroid therapy. Risk  Prescription drug management. Disposition  Final diagnoses:   Irritant contact dermatitis due to cosmetics     Time reflects when diagnosis was documented in both MDM as applicable and the Disposition within this note       Time User Action Codes Description Comment    10/29/2023  2:51 PM Jair Chester Add [L24.3] Irritant contact dermatitis due to cosmetics           ED Disposition       ED Disposition   Discharge    Condition   Stable    Date/Time   Sun Oct 29, 2023 1 [facility] [place] discharge to home/self care. Follow-up Information       Follow up With Specialties Details Why Contact Info Additional Information    [address] [place] [address] Emergency Department Emergency Medicine  As needed [address] [facility] [id number] [address] [facility] Emergency Department, [place], [place], [address]            Discharge Medication List as of 10/29/2023  2:51 PM        START taking these medications    Details   ! ! predniSONE 20 mg tablet Take 3 tablets (60 mg total) by mouth daily for 5 days, Starting Sun 10/29/2023, Until Fri 11/3/2023, Normal       !! - Potential duplicate medications found. Please discuss with provider.         CONTINUE these medications which have NOT CHANGED    Details   baclofen 10 mg tablet Take 1 tablet (10 mg total) by mouth 3 (three) times a day as needed for muscle spasms, Starting Tue 5/16/2023, Normal      diazepam (VALIUM) 5 mg tablet Take 1 tablet (5 mg total) by mouth every 8 (eight) hours as needed for anxiety, Starting Fri 5/12/2023, Print      diclofenac (VOLTAREN) 75 mg EC tablet Take 1 tablet (75 mg total) by mouth 2 (two) times a day, Starting Wed 6/14/2023, Normal      diclofenac sodium (VOLTAREN) 1 % Apply 2 g topically 4 (four) times a day as needed (pain), Starting Tue 12/3/2019, Print      dicyclomine (BENTYL) 20 mg tablet Take 1 tablet (20 mg total) by mouth 2 (two) times a day for 3 days, Starting Sat 5/11/2019, Until Sat 11/16/2019, Print      EPINEPHrine (EPIPEN) 0.3 mg/0.3 mL SOAJ Inject 0.3 mL into the shoulder, thigh, or buttocks once for 1 dose If needed for allergic reaction.   Proceed immediately to ED if you use the EpiPen., Starting Mon 8/21/2017, Print      etanercept (ENBREL) 50 mg/mL SOSY Inject under the skin once a week, Historical Med      HYDROcodone-acetaminophen (NORCO) 5-325 mg per tablet Take 1 tablet by mouth every 6 (six) hours as needed (Not relieved by Anti-inflammatory) for up to 12 dosesMax Daily Amount: 4 tablets, Starting Fri 9/6/2019, Print      hydrOXYzine HCL (ATARAX) 25 mg tablet Take 25 mg by mouth 3 (three) times a day, Historical Med      !! ibuprofen (MOTRIN) 800 mg tablet Take 1 tablet (800 mg total) by mouth every 8 (eight) hours as needed for moderate pain, Starting Wed 7/14/2021, Normal      !! ibuprofen (MOTRIN) 800 mg tablet Take 1 tablet (800 mg total) by mouth 3 (three) times a day, Starting Sat 12/10/2022, Normal      ketorolac (TORADOL) 10 mg tablet Take 1 tablet (10 mg total) by mouth 2 (two) times a day as needed for moderate pain, Starting Tue 5/16/2023, Normal      LORazepam (ATIVAN) 1 mg tablet Take 1 tablet (1 mg total) by mouth 3 (three) times a day as needed for anxiety for up to 5 days, Starting Sat 12/8/2018, Until Sat 11/16/2019, Print      Magnesium Hydroxide (MAGNESIA PO) Take by mouth, Historical Med      magnesium oxide (MAG-OX) 400 mg Take 1 tablet (400 mg total) by mouth 2 (two) times a day for 14 days, Starting Fri 10/2/2020, Until Fri 10/16/2020, Normal      Melatonin 10 MG CAPS TAKE 1 CAPSULE BY MOUTH EVERYDAY AT BEDTIME, Historical Med      !! methocarbamol (ROBAXIN) 500 mg tablet Take 1 tablet (500 mg total) by mouth 3 (three) times a day as needed for muscle spasms, Starting Wed 7/14/2021, Normal      !! methocarbamol (ROBAXIN) 500 mg tablet Take 1 tablet (500 mg total) by mouth 2 (two) times a day, Starting Sat 12/10/2022, Normal      methylPREDNISolone (MEDROL) 4 MG tablet therapy pack Use as directed on package, Print      oxyCODONE-acetaminophen (PERCOCET) 5-325 mg per tablet Take 1 tablet by mouth every 8 (eight) hours as needed for moderate painMax Daily Amount: 3 tablets, Starting Mon 2/24/2020, Normal      !! predniSONE 20 mg tablet Take 2 tablets (40 mg total) by mouth daily, Starting Mon 7/13/2020, Print       !! - Potential duplicate medications found. Please discuss with provider. No discharge procedures on file.     PDMP Review       None            ED Provider  Electronically Signed by             TANNER Johnson  10/29/23 8390

## 2024-04-16 ENCOUNTER — HOSPITAL ENCOUNTER (EMERGENCY)
Facility: HOSPITAL | Age: 39
Discharge: HOME | End: 2024-04-16
Attending: EMERGENCY MEDICINE | Admitting: EMERGENCY MEDICINE
Payer: COMMERCIAL

## 2024-04-16 ENCOUNTER — APPOINTMENT (EMERGENCY)
Dept: CT IMAGING | Facility: HOSPITAL | Age: 39
End: 2024-04-16
Payer: COMMERCIAL

## 2024-04-16 VITALS
OXYGEN SATURATION: 100 % | SYSTOLIC BLOOD PRESSURE: 113 MMHG | TEMPERATURE: 98 F | HEART RATE: 82 BPM | DIASTOLIC BLOOD PRESSURE: 64 MMHG | RESPIRATION RATE: 16 BRPM

## 2024-04-16 DIAGNOSIS — R10.9 ABDOMINAL PAIN: Primary | ICD-10-CM

## 2024-04-16 DIAGNOSIS — N30.90 CYSTITIS: ICD-10-CM

## 2024-04-16 LAB
ALBUMIN SERPL BCP-MCNC: 4.3 G/DL (ref 3.5–5)
ALP SERPL-CCNC: 55 U/L (ref 34–104)
ALT SERPL W P-5'-P-CCNC: 11 U/L (ref 7–52)
ANION GAP SERPL CALCULATED.3IONS-SCNC: 8 MMOL/L (ref 4–13)
AST SERPL W P-5'-P-CCNC: 15 U/L (ref 13–39)
ATRIAL RATE: 70 BPM
BASOPHILS # BLD AUTO: 0.04 THOUSANDS/ÂΜL (ref 0–0.1)
BASOPHILS NFR BLD AUTO: 1 % (ref 0–1)
BILIRUB SERPL-MCNC: 0.57 MG/DL (ref 0.2–1)
BILIRUB UR QL STRIP: NEGATIVE
BUN SERPL-MCNC: 16 MG/DL (ref 5–25)
CALCIUM SERPL-MCNC: 8.7 MG/DL (ref 8.4–10.2)
CARDIAC TROPONIN I PNL SERPL HS: <2 NG/L
CARDIAC TROPONIN I PNL SERPL HS: <2 NG/L
CHLORIDE SERPL-SCNC: 107 MMOL/L (ref 96–108)
CLARITY UR: CLEAR
CO2 SERPL-SCNC: 22 MMOL/L (ref 21–32)
COLOR UR: COLORLESS
CREAT SERPL-MCNC: 0.62 MG/DL (ref 0.6–1.3)
EOSINOPHIL # BLD AUTO: 0.48 THOUSAND/ÂΜL (ref 0–0.61)
EOSINOPHIL NFR BLD AUTO: 7 % (ref 0–6)
ERYTHROCYTE [DISTWIDTH] IN BLOOD BY AUTOMATED COUNT: 17.3 % (ref 11.6–15.1)
EXT PREGNANCY TEST URINE: NEGATIVE
EXT. CONTROL: NORMAL
GFR SERPL CREATININE-BSD FRML MDRD: 113 ML/MIN/1.73SQ M
GLUCOSE SERPL-MCNC: 82 MG/DL (ref 65–140)
GLUCOSE UR STRIP-MCNC: NEGATIVE MG/DL
HCT VFR BLD AUTO: 31.9 % (ref 34.8–46.1)
HGB BLD-MCNC: 10.1 G/DL (ref 11.5–15.4)
HGB UR QL STRIP.AUTO: NEGATIVE
IMM GRANULOCYTES # BLD AUTO: 0.01 THOUSAND/UL (ref 0–0.2)
IMM GRANULOCYTES NFR BLD AUTO: 0 % (ref 0–2)
INR PPP: 1.01 (ref 0.84–1.19)
KETONES UR STRIP-MCNC: NEGATIVE MG/DL
LEUKOCYTE ESTERASE UR QL STRIP: NEGATIVE
LIPASE SERPL-CCNC: 24 U/L (ref 11–82)
LYMPHOCYTES # BLD AUTO: 2.31 THOUSANDS/ÂΜL (ref 0.6–4.47)
LYMPHOCYTES NFR BLD AUTO: 35 % (ref 14–44)
MCH RBC QN AUTO: 23.6 PG (ref 26.8–34.3)
MCHC RBC AUTO-ENTMCNC: 31.7 G/DL (ref 31.4–37.4)
MCV RBC AUTO: 75 FL (ref 82–98)
MONOCYTES # BLD AUTO: 0.73 THOUSAND/ÂΜL (ref 0.17–1.22)
MONOCYTES NFR BLD AUTO: 11 % (ref 4–12)
NEUTROPHILS # BLD AUTO: 3.04 THOUSANDS/ÂΜL (ref 1.85–7.62)
NEUTS SEG NFR BLD AUTO: 46 % (ref 43–75)
NITRITE UR QL STRIP: NEGATIVE
NRBC BLD AUTO-RTO: 0 /100 WBCS
P AXIS: 80 DEGREES
PH UR STRIP.AUTO: 5 [PH]
PLATELET # BLD AUTO: 306 THOUSANDS/UL (ref 149–390)
PMV BLD AUTO: 11.2 FL (ref 8.9–12.7)
POTASSIUM SERPL-SCNC: 3.7 MMOL/L (ref 3.5–5.3)
PR INTERVAL: 134 MS
PROT SERPL-MCNC: 7.2 G/DL (ref 6.4–8.4)
PROT UR STRIP-MCNC: NEGATIVE MG/DL
PROTHROMBIN TIME: 13.9 SECONDS (ref 11.6–14.5)
QRS AXIS: 74 DEGREES
QRSD INTERVAL: 94 MS
QT INTERVAL: 376 MS
QTC INTERVAL: 406 MS
RBC # BLD AUTO: 4.28 MILLION/UL (ref 3.81–5.12)
SODIUM SERPL-SCNC: 137 MMOL/L (ref 135–147)
SP GR UR STRIP.AUTO: 1.01 (ref 1–1.03)
T WAVE AXIS: 72 DEGREES
UROBILINOGEN UR STRIP-ACNC: <2 MG/DL
VENTRICULAR RATE: 70 BPM
WBC # BLD AUTO: 6.61 THOUSAND/UL (ref 4.31–10.16)

## 2024-04-16 PROCEDURE — 96375 TX/PRO/DX INJ NEW DRUG ADDON: CPT

## 2024-04-16 PROCEDURE — 81003 URINALYSIS AUTO W/O SCOPE: CPT | Performed by: EMERGENCY MEDICINE

## 2024-04-16 PROCEDURE — 93005 ELECTROCARDIOGRAM TRACING: CPT

## 2024-04-16 PROCEDURE — 80053 COMPREHEN METABOLIC PANEL: CPT | Performed by: EMERGENCY MEDICINE

## 2024-04-16 PROCEDURE — 96374 THER/PROPH/DIAG INJ IV PUSH: CPT

## 2024-04-16 PROCEDURE — 93010 ELECTROCARDIOGRAM REPORT: CPT | Performed by: INTERNAL MEDICINE

## 2024-04-16 PROCEDURE — 85025 COMPLETE CBC W/AUTO DIFF WBC: CPT | Performed by: EMERGENCY MEDICINE

## 2024-04-16 PROCEDURE — 81025 URINE PREGNANCY TEST: CPT | Performed by: EMERGENCY MEDICINE

## 2024-04-16 PROCEDURE — 99284 EMERGENCY DEPT VISIT MOD MDM: CPT

## 2024-04-16 PROCEDURE — 99284 EMERGENCY DEPT VISIT MOD MDM: CPT | Performed by: EMERGENCY MEDICINE

## 2024-04-16 PROCEDURE — 84484 ASSAY OF TROPONIN QUANT: CPT | Performed by: EMERGENCY MEDICINE

## 2024-04-16 PROCEDURE — 83690 ASSAY OF LIPASE: CPT | Performed by: EMERGENCY MEDICINE

## 2024-04-16 PROCEDURE — 36415 COLL VENOUS BLD VENIPUNCTURE: CPT | Performed by: EMERGENCY MEDICINE

## 2024-04-16 PROCEDURE — 85610 PROTHROMBIN TIME: CPT | Performed by: EMERGENCY MEDICINE

## 2024-04-16 PROCEDURE — 74177 CT ABD & PELVIS W/CONTRAST: CPT

## 2024-04-16 RX ORDER — NITROFURANTOIN 25; 75 MG/1; MG/1
100 CAPSULE ORAL 2 TIMES DAILY WITH MEALS
Status: DISCONTINUED | OUTPATIENT
Start: 2024-04-16 | End: 2024-04-16 | Stop reason: HOSPADM

## 2024-04-16 RX ORDER — FENTANYL CITRATE 50 UG/ML
50 INJECTION, SOLUTION INTRAMUSCULAR; INTRAVENOUS ONCE
Status: COMPLETED | OUTPATIENT
Start: 2024-04-16 | End: 2024-04-16

## 2024-04-16 RX ORDER — PHENAZOPYRIDINE HYDROCHLORIDE 200 MG/1
200 TABLET, FILM COATED ORAL 3 TIMES DAILY
Qty: 6 TABLET | Refills: 0 | Status: SHIPPED | OUTPATIENT
Start: 2024-04-16

## 2024-04-16 RX ORDER — NITROFURANTOIN 25; 75 MG/1; MG/1
100 CAPSULE ORAL 2 TIMES DAILY
Qty: 10 CAPSULE | Refills: 0 | Status: SHIPPED | OUTPATIENT
Start: 2024-04-16

## 2024-04-16 RX ORDER — ONDANSETRON 2 MG/ML
4 INJECTION INTRAMUSCULAR; INTRAVENOUS ONCE
Status: COMPLETED | OUTPATIENT
Start: 2024-04-16 | End: 2024-04-16

## 2024-04-16 RX ORDER — DIPHENHYDRAMINE HYDROCHLORIDE 50 MG/ML
50 INJECTION INTRAMUSCULAR; INTRAVENOUS ONCE
Status: COMPLETED | OUTPATIENT
Start: 2024-04-16 | End: 2024-04-16

## 2024-04-16 RX ADMIN — DIPHENHYDRAMINE HYDROCHLORIDE 50 MG: 50 INJECTION, SOLUTION INTRAMUSCULAR; INTRAVENOUS at 14:14

## 2024-04-16 RX ADMIN — ONDANSETRON 4 MG: 2 INJECTION INTRAMUSCULAR; INTRAVENOUS at 14:13

## 2024-04-16 RX ADMIN — NITROFURANTOIN (MONOHYDRATE/MACROCRYSTALS) 100 MG: 75; 25 CAPSULE ORAL at 17:27

## 2024-04-16 RX ADMIN — FENTANYL CITRATE 50 MCG: 50 INJECTION INTRAMUSCULAR; INTRAVENOUS at 14:14

## 2024-04-16 RX ADMIN — IOHEXOL 80 ML: 350 INJECTION, SOLUTION INTRAVENOUS at 16:30

## 2024-04-16 NOTE — ED PROVIDER NOTES
History  Chief Complaint   Patient presents with    Abdominal Pain     Lower Abdominal pain/pelvic pressure that radiates to the back along with abdominal bloating xfew weeks.     38 year old [place] speaking mostly female pt comes in with pelvic pain.  She states a history of endometriosis but that this pain is different.  She states feeling bloating/fullness in the abdominopelvic area.  She has tenderness in the abdomen in all four quadrents.    H&P was done with the help of her .      Nothing makes the pain better or worse    She has taken nothing for the pain.      History provided by:  Patient   used: No    Abdominal Pain  Pain location:  Generalized  Pain quality: aching and sharp    Pain severity:  Mild  Onset quality:  Sudden  Timing:  Constant  Progression:  Worsening  Context: not alcohol use, not eating and not recent illness    Ineffective treatments:  None tried  Associated symptoms: no chest pain, no constipation and no fever        Prior to Admission Medications   Prescriptions Last Dose Informant Patient Reported? Taking?   EPINEPHrine (EPIPEN) 0.3 mg/0.3 mL SOAJ   No No   Sig: Inject 0.3 mL into the shoulder, thigh, or buttocks once for 1 dose If needed for allergic reaction.  Proceed immediately to ED if you use the EpiPen.   HYDROcodone-acetaminophen (NORCO) 5-325 mg per tablet  Self No No   Sig: Take 1 tablet by mouth every 6 (six) hours as needed (Not relieved by Anti-inflammatory) for up to 12 dosesMax Daily Amount: 4 tablets   Patient not taking: Reported on 10/2/2020   LORazepam (ATIVAN) 1 mg tablet   No No   Sig: Take 1 tablet (1 mg total) by mouth 3 (three) times a day as needed for anxiety for up to 5 days   Magnesium Hydroxide (MAGNESIA PO)  Self Yes No   Sig: Take by mouth   Melatonin 10 MG CAPS  Self Yes No   Sig: TAKE 1 CAPSULE BY MOUTH EVERYDAY AT BEDTIME   baclofen 10 mg tablet  Self No No   Sig: Take 1 tablet (10 mg total) by mouth 3 (three) times a day as  needed for muscle spasms   diazepam (VALIUM) 5 mg tablet  Self No No   Sig: Take 1 tablet (5 mg total) by mouth every 8 (eight) hours as needed for anxiety   diclofenac (VOLTAREN) 75 mg EC tablet   No No   Sig: Take 1 tablet (75 mg total) by mouth 2 (two) times a day   diclofenac sodium (VOLTAREN) 1 %  Self No No   Sig: Apply 2 g topically 4 (four) times a day as needed (pain)   Patient not taking: Reported on 10/2/2020   dicyclomine (BENTYL) 20 mg tablet   No No   Sig: Take 1 tablet (20 mg total) by mouth 2 (two) times a day for 3 days   etanercept (ENBREL) 50 mg/mL SOSY  Self Yes No   Sig: Inject under the skin once a week   Patient not taking: Reported on 6/12/2023   hydrOXYzine HCL (ATARAX) 25 mg tablet  Self Yes No   Sig: Take 25 mg by mouth 3 (three) times a day   ibuprofen (MOTRIN) 800 mg tablet  Self No No   Sig: Take 1 tablet (800 mg total) by mouth every 8 (eight) hours as needed for moderate pain   ibuprofen (MOTRIN) 800 mg tablet  Self No No   Sig: Take 1 tablet (800 mg total) by mouth 3 (three) times a day   Patient not taking: Reported on 5/16/2023   ketorolac (TORADOL) 10 mg tablet  Self No No   Sig: Take 1 tablet (10 mg total) by mouth 2 (two) times a day as needed for moderate pain   magnesium oxide (MAG-OX) 400 mg   No No   Sig: Take 1 tablet (400 mg total) by mouth 2 (two) times a day for 14 days   methocarbamol (ROBAXIN) 500 mg tablet  Self No No   Sig: Take 1 tablet (500 mg total) by mouth 3 (three) times a day as needed for muscle spasms   Patient not taking: Reported on 5/16/2023   methocarbamol (ROBAXIN) 500 mg tablet  Self No No   Sig: Take 1 tablet (500 mg total) by mouth 2 (two) times a day   Patient not taking: Reported on 5/16/2023   methylPREDNISolone (MEDROL) 4 MG tablet therapy pack  Self No No   Sig: Use as directed on package   Patient not taking: Reported on 6/12/2023   oxyCODONE-acetaminophen (PERCOCET) 5-325 mg per tablet  Self No No   Sig: Take 1 tablet by mouth every 8 (eight)  hours as needed for moderate painMax Daily Amount: 3 tablets   Patient not taking: Reported on 6/12/2023   predniSONE 20 mg tablet  Self No No   Sig: Take 2 tablets (40 mg total) by mouth daily   Patient not taking: Reported on 5/16/2023      Facility-Administered Medications: None       Past Medical History:   Diagnosis Date    Anxiety     Arthritis     Asthma     Fibromyalgia     Panic attack     Rheumatoid arthritis (HCC)        Past Surgical History:   Procedure Laterality Date    CARPAL TUNNEL RELEASE Right     TUBAL LIGATION      TUBAL LIGATION         History reviewed. No pertinent family history.  I have reviewed and agree with the history as documented.    E-Cigarette/Vaping    E-Cigarette Use Never User      E-Cigarette/Vaping Substances     Social History     Tobacco Use    Smoking status: Never    Smokeless tobacco: Never   Vaping Use    Vaping status: Never Used   Substance Use Topics    Alcohol use: Yes     Comment: occ    Drug use: No       Review of Systems   Constitutional:  Negative for fever.   Cardiovascular:  Negative for chest pain.   Gastrointestinal:  Positive for abdominal pain. Negative for constipation.   All other systems reviewed and are negative.      Physical Exam  Physical Exam  Vitals and nursing note reviewed.   Constitutional:       General: She is not in acute distress.     Appearance: She is well-developed.   HENT:      Head: Normocephalic and atraumatic.   Eyes:      Conjunctiva/sclera: Conjunctivae normal.   Cardiovascular:      Rate and Rhythm: Normal rate and regular rhythm.      Heart sounds: No murmur heard.  Pulmonary:      Effort: Pulmonary effort is normal. No respiratory distress.      Breath sounds: Normal breath sounds.   Abdominal:      Palpations: Abdomen is soft.      Tenderness: There is no abdominal tenderness.   Musculoskeletal:         General: No swelling.      Cervical back: Neck supple.   Skin:     General: Skin is warm and dry.      Capillary Refill:  Capillary refill takes less than 2 seconds.   Neurological:      Mental Status: She is alert.   Psychiatric:         Mood and Affect: Mood normal.         Vital Signs  ED Triage Vitals   Temperature Pulse Respirations Blood Pressure SpO2   04/16/24 1316 04/16/24 1316 04/16/24 1316 04/16/24 1316 04/16/24 1316   98 °F (36.7 °C) 73 20 123/79 100 %      Temp Source Heart Rate Source Patient Position - Orthostatic VS BP Location FiO2 (%)   04/16/24 1316 04/16/24 1316 04/16/24 1316 04/16/24 1316 --   Temporal Monitor Sitting Left arm       Pain Score       04/16/24 1414       7           Vitals:    04/16/24 1316 04/16/24 1534 04/16/24 1545 04/16/24 1700   BP: 123/79 111/73 111/73 113/64   Pulse: 73 67 84 82   Patient Position - Orthostatic VS: Sitting Sitting Sitting Sitting         Visual Acuity      ED Medications  Medications   fentaNYL injection 50 mcg (50 mcg Intravenous Given 4/16/24 1414)   ondansetron (ZOFRAN) injection 4 mg (4 mg Intravenous Given 4/16/24 1413)   diphenhydrAMINE (BENADRYL) injection 50 mg (50 mg Intravenous Given 4/16/24 1414)   iohexol (OMNIPAQUE) 350 MG/ML injection (MULTI-DOSE) 100 mL (80 mL Intravenous Given 4/16/24 1630)       Diagnostic Studies  Results Reviewed       Procedure Component Value Units Date/Time    HS Troponin I 2hr [[id number]] Collected: 04/16/24 1620    Lab Status: Final result Specimen: Blood from Arm, Left Updated: 04/16/24 1649     hs TnI 2hr <2 ng/L      Delta 2hr hsTnI --    POCT pregnancy, urine [[id number]]  (Normal) Resulted: 04/16/24 1535    Lab Status: Final result Updated: 04/16/24 1535     EXT Preg Test, Ur Negative     Control Valid    HS Troponin 0hr (reflex protocol) [[id number]]  (Normal) Collected: 04/16/24 1409    Lab Status: Final result Specimen: Blood from Arm, Left Updated: 04/16/24 1449     hs TnI 0hr <2 ng/L     Comprehensive metabolic panel [[id number]] Collected: 04/16/24 1409    Lab Status: Final result Specimen: Blood from Arm, Left Updated: 04/16/24  1443     Sodium 137 mmol/L      Potassium 3.7 mmol/L      Chloride 107 mmol/L      CO2 22 mmol/L      ANION GAP 8 mmol/L      BUN 16 mg/dL      Creatinine 0.62 mg/dL      Glucose 82 mg/dL      Calcium 8.7 mg/dL      AST 15 U/L      ALT 11 U/L      Alkaline Phosphatase 55 U/L      Total Protein 7.2 g/dL      Albumin 4.3 g/dL      Total Bilirubin 0.57 mg/dL      eGFR 113 ml/min/1.73sq m     Narrative:      National Kidney Disease Foundation guidelines for Chronic Kidney Disease (CKD):     Stage 1 with normal or high GFR (GFR > 90 mL/min/1.73 square meters)    Stage 2 Mild CKD (GFR = 60-89 mL/min/1.73 square meters)    Stage 3A Moderate CKD (GFR = 45-59 mL/min/1.73 square meters)    Stage 3B Moderate CKD (GFR = 30-44 mL/min/1.73 square meters)    Stage 4 Severe CKD (GFR = 15-29 mL/min/1.73 square meters)    Stage 5 End Stage CKD (GFR <15 mL/min/1.73 square meters)  Note: GFR calculation is accurate only with a steady state creatinine    Lipase [[id number]]  (Normal) Collected: 04/16/24 1409    Lab Status: Final result Specimen: Blood from Arm, Left Updated: 04/16/24 1443     Lipase 24 u/L     Protime-INR [[id number]]  (Normal) Collected: 04/16/24 1409    Lab Status: Final result Specimen: Blood from Arm, Left Updated: 04/16/24 1437     Protime 13.9 seconds      INR 1.01    UA w Reflex to Microscopic w Reflex to Culture [[id number]] Collected: 04/16/24 1333    Lab Status: Final result Specimen: Urine, Other Updated: 04/16/24 1429     Color, UA Colorless     Clarity, UA Clear     Specific Gravity, UA 1.014     pH, UA 5.0     Leukocytes, UA Negative     Nitrite, UA Negative     Protein, UA Negative mg/dl      Glucose, UA Negative mg/dl      Ketones, UA Negative mg/dl      Urobilinogen, UA <2.0 mg/dl      Bilirubin, UA Negative     Occult Blood, UA Negative    CBC and differential [[id number]]  (Abnormal) Collected: 04/16/24 1409    Lab Status: Final result Specimen: Blood from Arm, Left Updated: 04/16/24 1423     WBC 6.61  Thousand/uL      RBC 4.28 Million/uL      Hemoglobin 10.1 g/dL      Hematocrit 31.9 %      MCV 75 fL      MCH 23.6 pg      MCHC 31.7 g/dL      RDW 17.3 %      MPV 11.2 fL      Platelets 306 Thousands/uL      nRBC 0 /100 WBCs      Segmented % 46 %      Immature Grans % 0 %      Lymphocytes % 35 %      Monocytes % 11 %      Eosinophils Relative 7 %      Basophils Relative 1 %      Absolute Neutrophils 3.04 Thousands/µL      Absolute Immature Grans 0.01 Thousand/uL      Absolute Lymphocytes 2.31 Thousands/µL      Absolute Monocytes 0.73 Thousand/µL      Eosinophils Absolute 0.48 Thousand/µL      Basophils Absolute 0.04 Thousands/µL                    CT abdomen pelvis with contrast   Final Result by Krystian Vivas MD (04/16 1654)      Borderline thickening of the urinary bladder wall when accounting for degree of distention. Recommend correlation for urinalysis evidence of cystitis. Otherwise, no acute abdominopelvic abnormalities.         Workstation performed: [id number]                    Procedures  Procedures         ED Course                               SBIRT 22yo+      Flowsheet Row Most Recent Value   Initial Alcohol Screen: US AUDIT-C     1. How often do you have a drink containing alcohol? 0 Filed at: 04/16/2024 1319   2. How many drinks containing alcohol do you have on a typical day you are drinking?  0 Filed at: 04/16/2024 1319   3b. FEMALE Any Age, or MALE 65+: How often do you have 4 or more drinks on one occassion? 0 Filed at: 04/16/2024 1319   Audit-C Score 0 Filed at: 04/16/2024 1319   DREA: How many times in the past year have you...    Used an illegal drug or used a prescription medication for non-medical reasons? Never Filed at: 04/16/2024 1319                      Medical Decision Making  Amount and/or Complexity of Data Reviewed  Labs: ordered.  Radiology: ordered.    Risk  Prescription drug management.             Disposition  Final diagnoses:   Abdominal pain   Cystitis     Time reflects  when diagnosis was documented in both MDM as applicable and the Disposition within this note       Time User Action Codes Description Comment    4/16/2024  1:54 PM Tevin Smith [R10.9] Abdominal pain     4/16/2024  5:17 PM Tevin Smith [N30.90] Cystitis           ED Disposition       ED Disposition   Discharge    Condition   Stable    Date/Time   Tue Apr 16, 2024 9431    Comment   Roseanne Maldonado discharge to home/self care.                   Follow-up Information    None         Discharge Medication List as of 4/16/2024  5:22 PM        START taking these medications    Details   nitrofurantoin (MACROBID) 100 mg capsule Take 1 capsule (100 mg total) by mouth 2 (two) times a day, Starting Tue 4/16/2024, Normal      phenazopyridine (PYRIDIUM) 200 mg tablet Take 1 tablet (200 mg total) by mouth 3 (three) times a day, Starting Tue 4/16/2024, Normal           CONTINUE these medications which have NOT CHANGED    Details   baclofen 10 mg tablet Take 1 tablet (10 mg total) by mouth 3 (three) times a day as needed for muscle spasms, Starting Tue 5/16/2023, Normal      diazepam (VALIUM) 5 mg tablet Take 1 tablet (5 mg total) by mouth every 8 (eight) hours as needed for anxiety, Starting Fri 5/12/2023, Print      diclofenac (VOLTAREN) 75 mg EC tablet Take 1 tablet (75 mg total) by mouth 2 (two) times a day, Starting Wed 6/14/2023, Normal      diclofenac sodium (VOLTAREN) 1 % Apply 2 g topically 4 (four) times a day as needed (pain), Starting Tue 12/3/2019, Print      dicyclomine (BENTYL) 20 mg tablet Take 1 tablet (20 mg total) by mouth 2 (two) times a day for 3 days, Starting Sat 5/11/2019, Until Sat 11/16/2019, Print      EPINEPHrine (EPIPEN) 0.3 mg/0.3 mL SOAJ Inject 0.3 mL into the shoulder, thigh, or buttocks once for 1 dose If needed for allergic reaction.  Proceed immediately to ED if you use the EpiPen., Starting Mon 8/21/2017, Print      etanercept (ENBREL) 50 mg/mL SOSY Inject under the skin once a week,  Historical Med      HYDROcodone-acetaminophen (NORCO) 5-325 mg per tablet Take 1 tablet by mouth every 6 (six) hours as needed (Not relieved by Anti-inflammatory) for up to 12 dosesMax Daily Amount: 4 tablets, Starting Fri 9/6/2019, Print      hydrOXYzine HCL (ATARAX) 25 mg tablet Take 25 mg by mouth 3 (three) times a day, Historical Med      !! ibuprofen (MOTRIN) 800 mg tablet Take 1 tablet (800 mg total) by mouth every 8 (eight) hours as needed for moderate pain, Starting Wed 7/14/2021, Normal      !! ibuprofen (MOTRIN) 800 mg tablet Take 1 tablet (800 mg total) by mouth 3 (three) times a day, Starting Sat 12/10/2022, Normal      ketorolac (TORADOL) 10 mg tablet Take 1 tablet (10 mg total) by mouth 2 (two) times a day as needed for moderate pain, Starting Tue 5/16/2023, Normal      LORazepam (ATIVAN) 1 mg tablet Take 1 tablet (1 mg total) by mouth 3 (three) times a day as needed for anxiety for up to 5 days, Starting Sat 12/8/2018, Until Sat 11/16/2019, Print      Magnesium Hydroxide (MAGNESIA PO) Take by mouth, Historical Med      magnesium oxide (MAG-OX) 400 mg Take 1 tablet (400 mg total) by mouth 2 (two) times a day for 14 days, Starting Fri 10/2/2020, Until Fri 10/16/2020, Normal      Melatonin 10 MG CAPS TAKE 1 CAPSULE BY MOUTH EVERYDAY AT BEDTIME, Historical Med      !! methocarbamol (ROBAXIN) 500 mg tablet Take 1 tablet (500 mg total) by mouth 3 (three) times a day as needed for muscle spasms, Starting Wed 7/14/2021, Normal      !! methocarbamol (ROBAXIN) 500 mg tablet Take 1 tablet (500 mg total) by mouth 2 (two) times a day, Starting Sat 12/10/2022, Normal      methylPREDNISolone (MEDROL) 4 MG tablet therapy pack Use as directed on package, Print      oxyCODONE-acetaminophen (PERCOCET) 5-325 mg per tablet Take 1 tablet by mouth every 8 (eight) hours as needed for moderate painMax Daily Amount: 3 tablets, Starting Mon 2/24/2020, Normal      predniSONE 20 mg tablet Take 2 tablets (40 mg total) by mouth  daily, Starting Mon 7/13/2020, Print       !! - Potential duplicate medications found. Please discuss with provider.          No discharge procedures on file.    PDMP Review       None            ED Provider  Electronically Signed by             Tevin Smith DO  04/16/24 9216

## 2024-04-21 ENCOUNTER — APPOINTMENT (EMERGENCY)
Dept: CT IMAGING | Facility: HOSPITAL | Age: 39
End: 2024-04-21
Payer: COMMERCIAL

## 2024-04-21 ENCOUNTER — APPOINTMENT (EMERGENCY)
Dept: RADIOLOGY | Facility: HOSPITAL | Age: 39
End: 2024-04-21
Payer: COMMERCIAL

## 2024-04-21 ENCOUNTER — HOSPITAL ENCOUNTER (EMERGENCY)
Facility: HOSPITAL | Age: 39
Discharge: HOME | End: 2024-04-22
Attending: EMERGENCY MEDICINE
Payer: COMMERCIAL

## 2024-04-21 DIAGNOSIS — R10.9 ABDOMINAL PAIN: Primary | ICD-10-CM

## 2024-04-21 DIAGNOSIS — R07.9 CHEST PAIN, UNSPECIFIED: ICD-10-CM

## 2024-04-21 DIAGNOSIS — R11.2 NAUSEA & VOMITING: ICD-10-CM

## 2024-04-21 LAB
ALBUMIN SERPL BCP-MCNC: 4.1 G/DL (ref 3.5–5)
ALP SERPL-CCNC: 52 U/L (ref 34–104)
ALT SERPL W P-5'-P-CCNC: 9 U/L (ref 7–52)
ANION GAP SERPL CALCULATED.3IONS-SCNC: 8 MMOL/L (ref 4–13)
AST SERPL W P-5'-P-CCNC: 14 U/L (ref 13–39)
BASOPHILS # BLD AUTO: 0.05 THOUSANDS/ÂΜL (ref 0–0.1)
BASOPHILS NFR BLD AUTO: 1 % (ref 0–1)
BILIRUB SERPL-MCNC: 0.51 MG/DL (ref 0.2–1)
BILIRUB UR QL STRIP: NEGATIVE
BUN SERPL-MCNC: 19 MG/DL (ref 5–25)
CALCIUM SERPL-MCNC: 8.4 MG/DL (ref 8.4–10.2)
CARDIAC TROPONIN I PNL SERPL HS: <2 NG/L
CHLORIDE SERPL-SCNC: 107 MMOL/L (ref 96–108)
CLARITY UR: NORMAL
CO2 SERPL-SCNC: 22 MMOL/L (ref 21–32)
COLOR UR: YELLOW
CREAT SERPL-MCNC: 0.65 MG/DL (ref 0.6–1.3)
D DIMER PPP FEU-MCNC: 0.89 UG/ML FEU
EOSINOPHIL # BLD AUTO: 0.73 THOUSAND/ÂΜL (ref 0–0.61)
EOSINOPHIL NFR BLD AUTO: 10 % (ref 0–6)
ERYTHROCYTE [DISTWIDTH] IN BLOOD BY AUTOMATED COUNT: 17.9 % (ref 11.6–15.1)
EXT PREGNANCY TEST URINE: NEGATIVE
EXT. CONTROL: NORMAL
FLUAV RNA RESP QL NAA+PROBE: NEGATIVE
FLUBV RNA RESP QL NAA+PROBE: NEGATIVE
GFR SERPL CREATININE-BSD FRML MDRD: 111 ML/MIN/1.73SQ M
GLUCOSE SERPL-MCNC: 104 MG/DL (ref 65–140)
GLUCOSE UR STRIP-MCNC: NEGATIVE MG/DL
HCT VFR BLD AUTO: 31.1 % (ref 34.8–46.1)
HGB BLD-MCNC: 10.2 G/DL (ref 11.5–15.4)
HGB UR QL STRIP.AUTO: NEGATIVE
IMM GRANULOCYTES # BLD AUTO: 0.01 THOUSAND/UL (ref 0–0.2)
IMM GRANULOCYTES NFR BLD AUTO: 0 % (ref 0–2)
KETONES UR STRIP-MCNC: NEGATIVE MG/DL
LEUKOCYTE ESTERASE UR QL STRIP: NEGATIVE
LIPASE SERPL-CCNC: 14 U/L (ref 11–82)
LYMPHOCYTES # BLD AUTO: 2.55 THOUSANDS/ÂΜL (ref 0.6–4.47)
LYMPHOCYTES NFR BLD AUTO: 34 % (ref 14–44)
MCH RBC QN AUTO: 24.2 PG (ref 26.8–34.3)
MCHC RBC AUTO-ENTMCNC: 32.8 G/DL (ref 31.4–37.4)
MCV RBC AUTO: 74 FL (ref 82–98)
MONOCYTES # BLD AUTO: 0.82 THOUSAND/ÂΜL (ref 0.17–1.22)
MONOCYTES NFR BLD AUTO: 11 % (ref 4–12)
NEUTROPHILS # BLD AUTO: 3.36 THOUSANDS/ÂΜL (ref 1.85–7.62)
NEUTS SEG NFR BLD AUTO: 44 % (ref 43–75)
NITRITE UR QL STRIP: NEGATIVE
NRBC BLD AUTO-RTO: 0 /100 WBCS
PH UR STRIP.AUTO: 8 [PH]
PLATELET # BLD AUTO: 330 THOUSANDS/UL (ref 149–390)
PMV BLD AUTO: 10.8 FL (ref 8.9–12.7)
POTASSIUM SERPL-SCNC: 3.3 MMOL/L (ref 3.5–5.3)
PROT SERPL-MCNC: 6.7 G/DL (ref 6.4–8.4)
PROT UR STRIP-MCNC: NEGATIVE MG/DL
RBC # BLD AUTO: 4.21 MILLION/UL (ref 3.81–5.12)
RSV RNA RESP QL NAA+PROBE: NEGATIVE
SARS-COV-2 RNA RESP QL NAA+PROBE: NEGATIVE
SODIUM SERPL-SCNC: 137 MMOL/L (ref 135–147)
SP GR UR STRIP.AUTO: 1.02 (ref 1–1.03)
UROBILINOGEN UR STRIP-ACNC: <2 MG/DL
WBC # BLD AUTO: 7.52 THOUSAND/UL (ref 4.31–10.16)

## 2024-04-21 PROCEDURE — 96374 THER/PROPH/DIAG INJ IV PUSH: CPT

## 2024-04-21 PROCEDURE — 80053 COMPREHEN METABOLIC PANEL: CPT

## 2024-04-21 PROCEDURE — 81003 URINALYSIS AUTO W/O SCOPE: CPT

## 2024-04-21 PROCEDURE — 96361 HYDRATE IV INFUSION ADD-ON: CPT

## 2024-04-21 PROCEDURE — 74177 CT ABD & PELVIS W/CONTRAST: CPT

## 2024-04-21 PROCEDURE — 99285 EMERGENCY DEPT VISIT HI MDM: CPT

## 2024-04-21 PROCEDURE — 85025 COMPLETE CBC W/AUTO DIFF WBC: CPT

## 2024-04-21 PROCEDURE — 71275 CT ANGIOGRAPHY CHEST: CPT

## 2024-04-21 PROCEDURE — 71045 X-RAY EXAM CHEST 1 VIEW: CPT

## 2024-04-21 PROCEDURE — 84484 ASSAY OF TROPONIN QUANT: CPT

## 2024-04-21 PROCEDURE — 81025 URINE PREGNANCY TEST: CPT

## 2024-04-21 PROCEDURE — 85379 FIBRIN DEGRADATION QUANT: CPT

## 2024-04-21 PROCEDURE — 70450 CT HEAD/BRAIN W/O DYE: CPT

## 2024-04-21 PROCEDURE — 83690 ASSAY OF LIPASE: CPT

## 2024-04-21 PROCEDURE — 0241U HB NFCT DS VIR RESP RNA 4 TRGT: CPT

## 2024-04-21 PROCEDURE — 93005 ELECTROCARDIOGRAM TRACING: CPT

## 2024-04-21 PROCEDURE — 36415 COLL VENOUS BLD VENIPUNCTURE: CPT

## 2024-04-21 RX ORDER — ONDANSETRON 2 MG/ML
4 INJECTION INTRAMUSCULAR; INTRAVENOUS ONCE
Status: COMPLETED | OUTPATIENT
Start: 2024-04-21 | End: 2024-04-21

## 2024-04-21 RX ADMIN — IOHEXOL 100 ML: 350 INJECTION, SOLUTION INTRAVENOUS at 23:47

## 2024-04-21 RX ADMIN — ONDANSETRON 4 MG: 2 INJECTION INTRAMUSCULAR; INTRAVENOUS at 22:09

## 2024-04-21 RX ADMIN — SODIUM CHLORIDE 1000 ML: 0.9 INJECTION, SOLUTION INTRAVENOUS at 22:08

## 2024-04-21 NOTE — Clinical Note
Roseanne Maldonado was seen and treated in our emergency department on 4/21/2024.                Diagnosis:     Roseanne  may return to work on return date, is off the rest of the shift today.    She may return on this date: 04/23/2024         If you have any questions or concerns, please don't hesitate to call.      Oz Norton PA-C    ______________________________           _______________          _______________  Hospital Representative                              Date                                Time

## 2024-04-22 VITALS
TEMPERATURE: 97.7 F | DIASTOLIC BLOOD PRESSURE: 61 MMHG | HEART RATE: 68 BPM | RESPIRATION RATE: 18 BRPM | SYSTOLIC BLOOD PRESSURE: 106 MMHG | OXYGEN SATURATION: 98 %

## 2024-04-22 LAB
ATRIAL RATE: 98 BPM
CARDIAC TROPONIN I PNL SERPL HS: <2 NG/L
P AXIS: 82 DEGREES
PR INTERVAL: 138 MS
QRS AXIS: 80 DEGREES
QRSD INTERVAL: 96 MS
QT INTERVAL: 368 MS
QTC INTERVAL: 469 MS
T WAVE AXIS: 61 DEGREES
VENTRICULAR RATE: 98 BPM

## 2024-04-22 PROCEDURE — 93010 ELECTROCARDIOGRAM REPORT: CPT | Performed by: INTERNAL MEDICINE

## 2024-04-22 PROCEDURE — 84484 ASSAY OF TROPONIN QUANT: CPT

## 2024-04-22 PROCEDURE — 36415 COLL VENOUS BLD VENIPUNCTURE: CPT

## 2024-04-22 RX ORDER — ONDANSETRON 4 MG/1
4 TABLET, ORALLY DISINTEGRATING ORAL EVERY 6 HOURS PRN
Qty: 12 TABLET | Refills: 0 | Status: SHIPPED | OUTPATIENT
Start: 2024-04-22

## 2024-04-22 NOTE — ED PROVIDER NOTES
History  Chief Complaint   Patient presents with    Chest Pain     Pt started with chest pain this afternoon. Pt is also SOB, dizzy and vomiting.      The patient is a 38 y.o. female with a history of anxiety, arthritis, asthma, fibromyalgia, panic attack, rheumatoid arthritis who presents to [facility] with a chief complaint of epigastric abdominal pain. Symptoms began today and have been constant since onset. Her pain is currently rated as a 4/10 in severity and described as crampy without radiation. Associated symptoms include nausea, vomiting and lightheadedness. Symptoms are aggravated with none noted and alleviating factors include none noted. The patient denies fever, chills, night sweats, wheezing, cough, sputum, syncope, falls, trauma, LOC, dysuria, hematuria, frequency, urgency. No other reported symptoms at this time.  Patient affirms allergies to shrimp and shellfish          History provided by:  Patient   used: No    Chest Pain  Associated symptoms: abdominal pain, nausea, shortness of breath and vomiting    Associated symptoms: no back pain, no cough, no fever and no palpitations        Prior to Admission Medications   Prescriptions Last Dose Informant Patient Reported? Taking?   EPINEPHrine (EPIPEN) 0.3 mg/0.3 mL SOAJ   No No   Sig: Inject 0.3 mL into the shoulder, thigh, or buttocks once for 1 dose If needed for allergic reaction.  Proceed immediately to ED if you use the EpiPen.   HYDROcodone-acetaminophen (NORCO) 5-325 mg per tablet  Self No No   Sig: Take 1 tablet by mouth every 6 (six) hours as needed (Not relieved by Anti-inflammatory) for up to 12 dosesMax Daily Amount: 4 tablets   Patient not taking: Reported on 10/2/2020   LORazepam (ATIVAN) 1 mg tablet   No No   Sig: Take 1 tablet (1 mg total) by mouth 3 (three) times a day as needed for anxiety for up to 5 days   Magnesium Hydroxide (MAGNESIA PO)  Self Yes No   Sig: Take by mouth   Melatonin 10 MG CAPS   Self Yes No   Sig: TAKE 1 CAPSULE BY MOUTH EVERYDAY AT BEDTIME   baclofen 10 mg tablet  Self No No   Sig: Take 1 tablet (10 mg total) by mouth 3 (three) times a day as needed for muscle spasms   diazepam (VALIUM) 5 mg tablet  Self No No   Sig: Take 1 tablet (5 mg total) by mouth every 8 (eight) hours as needed for anxiety   diclofenac (VOLTAREN) 75 mg EC tablet   No No   Sig: Take 1 tablet (75 mg total) by mouth 2 (two) times a day   diclofenac sodium (VOLTAREN) 1 %  Self No No   Sig: Apply 2 g topically 4 (four) times a day as needed (pain)   Patient not taking: Reported on 10/2/2020   dicyclomine (BENTYL) 20 mg tablet   No No   Sig: Take 1 tablet (20 mg total) by mouth 2 (two) times a day for 3 days   etanercept (ENBREL) 50 mg/mL SOSY  Self Yes No   Sig: Inject under the skin once a week   Patient not taking: Reported on 6/12/2023   hydrOXYzine HCL (ATARAX) 25 mg tablet  Self Yes No   Sig: Take 25 mg by mouth 3 (three) times a day   ibuprofen (MOTRIN) 800 mg tablet  Self No No   Sig: Take 1 tablet (800 mg total) by mouth every 8 (eight) hours as needed for moderate pain   ibuprofen (MOTRIN) 800 mg tablet  Self No No   Sig: Take 1 tablet (800 mg total) by mouth 3 (three) times a day   Patient not taking: Reported on 5/16/2023   ketorolac (TORADOL) 10 mg tablet  Self No No   Sig: Take 1 tablet (10 mg total) by mouth 2 (two) times a day as needed for moderate pain   magnesium oxide (MAG-OX) 400 mg   No No   Sig: Take 1 tablet (400 mg total) by mouth 2 (two) times a day for 14 days   methocarbamol (ROBAXIN) 500 mg tablet  Self No No   Sig: Take 1 tablet (500 mg total) by mouth 3 (three) times a day as needed for muscle spasms   Patient not taking: Reported on 5/16/2023   methocarbamol (ROBAXIN) 500 mg tablet  Self No No   Sig: Take 1 tablet (500 mg total) by mouth 2 (two) times a day   Patient not taking: Reported on 5/16/2023   methylPREDNISolone (MEDROL) 4 MG tablet therapy pack  Self No No   Sig: Use as directed on  package   Patient not taking: Reported on 6/12/2023   nitrofurantoin (MACROBID) 100 mg capsule   No No   Sig: Take 1 capsule (100 mg total) by mouth 2 (two) times a day   oxyCODONE-acetaminophen (PERCOCET) 5-325 mg per tablet  Self No No   Sig: Take 1 tablet by mouth every 8 (eight) hours as needed for moderate painMax Daily Amount: 3 tablets   Patient not taking: Reported on 6/12/2023   phenazopyridine (PYRIDIUM) 200 mg tablet   No No   Sig: Take 1 tablet (200 mg total) by mouth 3 (three) times a day   predniSONE 20 mg tablet  Self No No   Sig: Take 2 tablets (40 mg total) by mouth daily   Patient not taking: Reported on 5/16/2023      Facility-Administered Medications: None       Past Medical History:   Diagnosis Date    Anxiety     Arthritis     Asthma     Fibromyalgia     Panic attack     Rheumatoid arthritis (HCC)        Past Surgical History:   Procedure Laterality Date    CARPAL TUNNEL RELEASE Right     TUBAL LIGATION      TUBAL LIGATION         History reviewed. No pertinent family history.  I have reviewed and agree with the history as documented.    E-Cigarette/Vaping    E-Cigarette Use Never User      E-Cigarette/Vaping Substances     Social History     Tobacco Use    Smoking status: Never    Smokeless tobacco: Never   Vaping Use    Vaping status: Never Used   Substance Use Topics    Alcohol use: Yes     Comment: occ    Drug use: No       Review of Systems   Constitutional:  Negative for chills and fever.   HENT:  Negative for ear pain and sore throat.    Eyes:  Negative for pain and visual disturbance.   Respiratory:  Positive for shortness of breath. Negative for cough.    Cardiovascular:  Positive for chest pain. Negative for palpitations.   Gastrointestinal:  Positive for abdominal pain, nausea and vomiting.   Genitourinary:  Negative for dysuria and hematuria.   Musculoskeletal:  Negative for arthralgias and back pain.   Skin:  Negative for color change and rash.   Neurological:  Positive for  light-headedness. Negative for seizures and syncope.   All other systems reviewed and are negative.      Physical Exam  Physical Exam  Vitals reviewed.   Constitutional:       General: She is not in acute distress.     Appearance: She is well-developed. She is not ill-appearing or toxic-appearing.   HENT:      Head: Normocephalic.   Eyes:      Extraocular Movements: Extraocular movements intact.      Pupils: Pupils are equal, round, and reactive to light.   Cardiovascular:      Rate and Rhythm: Normal rate.      Pulses:           Radial pulses are 2+ on the right side and 2+ on the left side.      Heart sounds: Normal heart sounds.   Pulmonary:      Effort: Pulmonary effort is normal. No tachypnea, accessory muscle usage or respiratory distress.      Breath sounds: No stridor. No decreased breath sounds, wheezing, rhonchi or rales.   Chest:      Chest wall: No tenderness.   Abdominal:      Palpations: Abdomen is soft.      Tenderness: There is no abdominal tenderness.   Musculoskeletal:         General: Normal range of motion.      Cervical back: Normal range of motion.      Right lower leg: No tenderness. No edema.      Left lower leg: No tenderness. No edema.   Skin:     General: Skin is warm and dry.      Capillary Refill: Capillary refill takes less than 2 seconds.      Coloration: Skin is not cyanotic.   Neurological:      General: No focal deficit present.      Mental Status: She is alert and oriented to person, place, and time.         Vital Signs  ED Triage Vitals [04/21/24 2142]   Temperature Pulse Respirations Blood Pressure SpO2   97.7 °F (36.5 °C) 100 18 129/73 100 %      Temp src Heart Rate Source Patient Position - Orthostatic VS BP Location FiO2 (%)   -- Monitor -- -- --      Pain Score       --           Vitals:    04/22/24 0000 04/22/24 0030 04/22/24 0100 04/22/24 0130   BP: 121/70 105/63 100/63 106/61   Pulse: 71 63 70 68         Visual Acuity      ED Medications  Medications   ondansetron (ZOFRAN)  injection 4 mg (4 mg Intravenous Given 4/21/24 2209)   sodium chloride 0.9 % bolus 1,000 mL (0 mL Intravenous Stopped 4/21/24 2353)   iohexol (OMNIPAQUE) 350 MG/ML injection (MULTI-DOSE) 100 mL (100 mL Intravenous Given 4/21/24 2347)       Diagnostic Studies  Results Reviewed       Procedure Component Value Units Date/Time    HS Troponin I 2hr [[id number]] Collected: 04/22/24 0021    Lab Status: Final result Specimen: Blood from Arm, Right Updated: 04/22/24 0053     hs TnI 2hr <2 ng/L      Delta 2hr hsTnI --    D-Dimer [[id number]]  (Abnormal) Collected: 04/21/24 2208    Lab Status: Final result Specimen: Blood from Arm, Right Updated: 04/21/24 2257     D-Dimer, Quant 0.89 ug/ml FEU     FLU/RSV/COVID - if FLU/RSV clinically relevant [[id number]]  (Normal) Collected: 04/21/24 2208    Lab Status: Final result Specimen: Nares from Nose Updated: 04/21/24 2254     SARS-CoV-2 Negative     INFLUENZA A PCR Negative     INFLUENZA B PCR Negative     RSV PCR Negative    Narrative:      FOR PEDIATRIC PATIENTS - copy/paste COVID Guidelines URL to browser: [email or web address]    SARS-CoV-2 assay is a Nucleic Acid Amplification assay intended for the  qualitative detection of nucleic acid from SARS-CoV-2 in nasopharyngeal  swabs. Results are for the presumptive identification of SARS-CoV-2 RNA.    Positive results are indicative of infection with SARS-CoV-2, the virus  causing COVID-19, but do not rule out bacterial infection or co-infection  with other viruses. Laboratories within the [place] and its  territories are required to report all positive results to the appropriate  public health authorities. Negative results do not preclude SARS-CoV-2  infection and should not be used as the sole basis for treatment or other  patient management decisions. Negative results must be combined with  clinical observations, patient history, and epidemiological information.  This test  has not been FDA cleared or approved.    This test has been authorized by FDA under an Emergency Use Authorization  (EUA). This test is only authorized for the duration of time the  declaration that circumstances exist justifying the authorization of the  emergency use of an in vitro diagnostic tests for detection of SARS-CoV-2  virus and/or diagnosis of COVID-19 infection under section 564(b)(1) of  the Act, 21 U.S.C. 360bbb-3(b)(1), unless the authorization is terminated  or revoked sooner. The test has been validated but independent review by FDA  and CLIA is pending.    Test performed using [employer] GeneXpert: This RT-PCR assay targets N2,  a region unique to SARS-CoV-2. A conserved region in the E-gene was chosen  for pan-Sarbecovirus detection which includes SARS-CoV-2.    According to CMS-2020-01-R, this platform meets the definition of high-throughput technology.    HS Troponin 0hr (reflex protocol) [[id number]]  (Normal) Collected: 04/21/24 2208    Lab Status: Final result Specimen: Blood from Arm, Right Updated: 04/21/24 2237     hs TnI 0hr <2 ng/L     UA w Reflex to Microscopic w Reflex to Culture [[id number]] Collected: 04/21/24 2225    Lab Status: Final result Specimen: Urine, Clean Catch Updated: 04/21/24 2234     Color, UA Yellow     Clarity, UA Extra Turbid     Specific Gravity, UA 1.025     pH, UA 8.0     Leukocytes, UA Negative     Nitrite, UA Negative     Protein, UA Negative mg/dl      Glucose, UA Negative mg/dl      Ketones, UA Negative mg/dl      Urobilinogen, UA <2.0 mg/dl      Bilirubin, UA Negative     Occult Blood, UA Negative    Comprehensive metabolic panel [[id number]]  (Abnormal) Collected: 04/21/24 2208    Lab Status: Final result Specimen: Blood from Arm, Right Updated: 04/21/24 2233     Sodium 137 mmol/L      Potassium 3.3 mmol/L      Chloride 107 mmol/L      CO2 22 mmol/L      ANION GAP 8 mmol/L      BUN 19 mg/dL      Creatinine 0.65 mg/dL      Glucose 104 mg/dL      Calcium 8.4 mg/dL       AST 14 U/L      ALT 9 U/L      Alkaline Phosphatase 52 U/L      Total Protein 6.7 g/dL      Albumin 4.1 g/dL      Total Bilirubin 0.51 mg/dL      eGFR 111 ml/min/1.73sq m     Narrative:      National Kidney Disease Foundation guidelines for Chronic Kidney Disease (CKD):     Stage 1 with normal or high GFR (GFR > 90 mL/min/1.73 square meters)    Stage 2 Mild CKD (GFR = 60-89 mL/min/1.73 square meters)    Stage 3A Moderate CKD (GFR = 45-59 mL/min/1.73 square meters)    Stage 3B Moderate CKD (GFR = 30-44 mL/min/1.73 square meters)    Stage 4 Severe CKD (GFR = 15-29 mL/min/1.73 square meters)    Stage 5 End Stage CKD (GFR <15 mL/min/1.73 square meters)  Note: GFR calculation is accurate only with a steady state creatinine    Lipase [[id number]]  (Normal) Collected: 04/21/24 2208    Lab Status: Final result Specimen: Blood from Arm, Right Updated: 04/21/24 2233     Lipase 14 u/L     POCT pregnancy, urine [[id number]]  (Normal) Resulted: 04/21/24 2228    Lab Status: Final result Updated: 04/21/24 2228     EXT Preg Test, Ur Negative     Control Valid    CBC and differential [[id number]]  (Abnormal) Collected: 04/21/24 2208    Lab Status: Final result Specimen: Blood from Arm, Right Updated: 04/21/24 2215     WBC 7.52 Thousand/uL      RBC 4.21 Million/uL      Hemoglobin 10.2 g/dL      Hematocrit 31.1 %      MCV 74 fL      MCH 24.2 pg      MCHC 32.8 g/dL      RDW 17.9 %      MPV 10.8 fL      Platelets 330 Thousands/uL      nRBC 0 /100 WBCs      Segmented % 44 %      Immature Grans % 0 %      Lymphocytes % 34 %      Monocytes % 11 %      Eosinophils Relative 10 %      Basophils Relative 1 %      Absolute Neutrophils 3.36 Thousands/µL      Absolute Immature Grans 0.01 Thousand/uL      Absolute Lymphocytes 2.55 Thousands/µL      Absolute Monocytes 0.82 Thousand/µL      Eosinophils Absolute 0.73 Thousand/µL      Basophils Absolute 0.05 Thousands/µL                    PE Study with CT abdomen & pelvis with contrast   Final  Result by Bea Ochoa MD (04/22 0155)      Findings suggesting enteritis. No bowel obstruction. Colonic diverticulosis without evidence of acute diverticulitis. Normal appendix.      No evidence of pulmonary embolism is seen.      The wall of the distal esophagus appears thickened. Clinical correlation and follow-up recommended.      There are some mildly prominent lymph nodes bilaterally in the axillary regions, right slightly greater than left. Clinical correlation recommended.      Enlarged myomatous uterus. There is a 1.5 cm collapsing corpus luteum on the left ovary. There is a small amount of free fluid in the pelvis, likely physiologic.      No evidence of acute pulmonary disease.      Other nonemergent findings as above.      The study was marked in EPIC for immediate notification.               Workstation performed: [id number]         CT head without contrast   Final Result by Krystian Martinez MD (04/22 0014)      No acute intracranial abnormality.                  Workstation performed: [id number]         XR chest 1 view portable   ED Interpretation by Oz Norton PA-C (04/22 0213)   No acute cardiopulmonary disease                 Procedures  Procedures         ED Course  ED Course as of 04/22/24 0305   Sun Apr 21, 2024   2237 hs TnI 0hr: <2   2314 D-Dimer, Quant(!): 0.89   Mon Apr 22, 2024   0019 CT head without contrast  No acute intracranial abnormality.   0055 hs TnI 2hr: <2   0207 PE Study with CT abdomen & pelvis with contrast  Findings suggesting enteritis. No bowel obstruction. Colonic diverticulosis without evidence of acute diverticulitis. Normal appendix.     No evidence of pulmonary embolism is seen.     The wall of the distal esophagus appears thickened. Clinical correlation and follow-up recommended.     There are some mildly prominent lymph nodes bilaterally in the axillary regions, right slightly greater than left. Clinical correlation recommended.     Enlarged myomatous uterus.  There is a 1.5 cm collapsing corpus luteum on the left ovary. There is a small amount of free fluid in the pelvis, likely physiologic.     No evidence of acute pulmonary disease.             HEART Risk Score      Flowsheet Row Most Recent Value   Heart Score Risk Calculator    History 0 Filed at: 04/22/2024 0304   ECG 0 Filed at: 04/22/2024 0304   Age 0 Filed at: 04/22/2024 0304   Risk Factors 1 Filed at: 04/22/2024 0304   Troponin 0 Filed at: 04/22/2024 0304   HEART Score 1 Filed at: 04/22/2024 0304                  PERC Rule for PE      Flowsheet Row Most Recent Value   PERC Rule for PE    Age >=50 0 Filed at: 04/22/2024 0304   HR >=100 1 Filed at: 04/22/2024 0304   O2 Sat on room air < 95% 0 Filed at: 04/22/2024 0304   History of PE or DVT 0 Filed at: 04/22/2024 0304   Recent trauma or surgery 0 Filed at: 04/22/2024 0304   Hemoptysis 0 Filed at: 04/22/2024 0304   Exogenous estrogen 0 Filed at: 04/22/2024 0304   Unilateral leg swelling 0 Filed at: 04/22/2024 0304   PERC Rule for PE Results 1 Filed at: 04/22/2024 0304                SBIRT 22yo+      Flowsheet Row Most Recent Value   Initial Alcohol Screen: US AUDIT-C     1. How often do you have a drink containing alcohol? 0 Filed at: 04/21/2024 2140   2. How many drinks containing alcohol do you have on a typical day you are drinking?  0 Filed at: 04/21/2024 2140   3b. FEMALE Any Age, or MALE 65+: How often do you have 4 or more drinks on one occassion? 0 Filed at: 04/21/2024 2140   Audit-C Score 0 Filed at: 04/21/2024 2140   DREA: How many times in the past year have you...    Used an illegal drug or used a prescription medication for non-medical reasons? Never Filed at: 04/21/2024 2140            Wells' Criteria for PE      Flowsheet Row Most Recent Value   Wells' Criteria for PE    Clinical signs and symptoms of DVT 0 Filed at: 04/22/2024 0304   PE is primary diagnosis or equally likely 0 Filed at: 04/22/2024 0304   HR >100 1.5 Filed at: 04/22/2024 0304    Immobilization at least 3 days or Surgery in the previous 4 weeks 0 Filed at: 04/22/2024 0304   Previous, objectively diagnosed PE or DVT 0 Filed at: 04/22/2024 0304   Hemoptysis 0 Filed at: 04/22/2024 0304   Malignancy with treatment within 6 months or palliative 0 Filed at: 04/22/2024 0304   Wells' Criteria Total 1.5 Filed at: 04/22/2024 0304                  Medical Decision Making  Exam without evidence of volume overload so doubt heart failure. EKG without signs of active ischemia. Given the timing of pain to ER presentation, initial troponin <2 delta troponin <2 so doubt NSTEMI. Presentation not consistent with acute PE (Wells low risk, cannot PERC out, dimer positive),pneumothorax (not visualized on chest xr), thoracic aortic dissection, pericarditis, tamponade, pneumonia (no infectious symptoms, clear chest xr), myocarditis (no recent illness, neg trop). HEART score: 1. She also presents with nausea, vomiting. Differential diagnosis includes possible acute gastroenteritis. Abdominal exam without peritoneal signs. Currently euvolemic without evidence of dehydration. Doubt invasive bacteria causing diarrhea such as C diff (no recent antibiotics), shiga toxin (non bloody). No recent travel. Patient is not immunocompromised. No evidence of surgical abdomen or other acute medical emergency including bowel obstruction, viscus perforation, vascular catastrophe, atypical appendicitis, acute cholecystitis, UGIB, thyrotoxicosis, or diverticulitis at this time. CTA PE and abdomen and pelvis showed Findings suggesting enteritis. No bowel obstruction. Colonic diverticulosis without evidence of acute diverticulitis. Normal appendix.  No evidence of pulmonary embolism is seen.  The wall of the distal esophagus appears thickened. Clinical correlation and follow-up recommended.  There are some mildly prominent lymph nodes bilaterally in the axillary regions, right slightly greater than left. Clinical correlation  recommended.  Enlarged myomatous uterus. There is a 1.5 cm collapsing corpus luteum on the left ovary. There is a small amount of free fluid in the pelvis, likely physiologic.  No evidence of acute pulmonary disease.  Discussed the results with the patient and recommended that she follow up with PCP and OBGYN. Patient is to rest, hydrate, take zofran as needed. Prior to discharge, discharge instructions were discussed with patient at bedside. Patient was provided both verbal and written instructions. Patient is understanding of the discharge instructions and is agreeable to plan of care. Return precautions were discussed with patient bedside, patient verbalized understanding of signs and symptoms that would necessitate return to the ED. All questions were answered. Patient was comfortable with the plan of care and discharged to home.       Problems Addressed:  Abdominal pain: acute illness or injury  Chest pain, unspecified: acute illness or injury  Nausea & vomiting: acute illness or injury    Amount and/or Complexity of Data Reviewed  Labs: ordered. Decision-making details documented in ED Course.  Radiology: ordered and independent interpretation performed. Decision-making details documented in ED Course.    Risk  Prescription drug management.             Disposition  Final diagnoses:   Abdominal pain   Nausea & vomiting   Chest pain, unspecified     Time reflects when diagnosis was documented in both MDM as applicable and the Disposition within this note       Time User Action Codes Description Comment    4/22/2024  2:14 AM Oz Norton Add [R10.9] Abdominal pain     4/22/2024  2:14 AM Oz Norton Add [R11.2] Nausea & vomiting     4/22/2024  2:14 AM Oz Norton Add [R07.9] Chest pain, unspecified           ED Disposition       ED Disposition   Discharge    Condition   Stable    Date/Time   Mon Apr 22, 2024 0217    Comment   Roseanne Maldonado discharge to home/self care.                   Follow-up  Information       Follow up With Specialties Details Why Contact Info Additional Information    Mendel Jimenez III, MD Internal Medicine Schedule an appointment as soon as possible for a visit   [address] [facility] [address]  [phone]       Count includes the [facility] Emergency Department Emergency Medicine  If symptoms worsen [address] [facility] [id number] [phone] Count includes the [facility] Emergency Department, [address] [place], [place], [address]    [facility] [place] [facility] Schedule an appointment as soon as possible for a visit  As needed [address]  [facility] [address] [phone] [facility] [place], [address], [place], [place], [address] [phone]             Discharge Medication List as of 4/22/2024  2:17 AM        START taking these medications    Details   ondansetron (ZOFRAN-ODT) 4 mg disintegrating tablet Take 1 tablet (4 mg total) by mouth every 6 (six) hours as needed for nausea or vomiting, Starting Mon 4/22/2024, Normal           CONTINUE these medications which have NOT CHANGED    Details   baclofen 10 mg tablet Take 1 tablet (10 mg total) by mouth 3 (three) times a day as needed for muscle spasms, Starting Tue 5/16/2023, Normal      diazepam (VALIUM) 5 mg tablet Take 1 tablet (5 mg total) by mouth every 8 (eight) hours as needed for anxiety, Starting Fri 5/12/2023, Print      diclofenac (VOLTAREN) 75 mg EC tablet Take 1 tablet (75 mg total) by mouth 2 (two) times a day, Starting Wed 6/14/2023, Normal      diclofenac sodium (VOLTAREN) 1 % Apply 2 g topically 4 (four) times a day as needed (pain), Starting Tue 12/3/2019, Print      dicyclomine (BENTYL) 20 mg tablet Take 1 tablet (20 mg total) by mouth 2 (two) times a day for 3 days, Starting Sat 5/11/2019, Until Sat 11/16/2019, Print      EPINEPHrine  (EPIPEN) 0.3 mg/0.3 mL SOAJ Inject 0.3 mL into the shoulder, thigh, or buttocks once for 1 dose If needed for allergic reaction.  Proceed immediately to ED if you use the EpiPen., Starting Mon 8/21/2017, Print      etanercept (ENBREL) 50 mg/mL SOSY Inject under the skin once a week, Historical Med      HYDROcodone-acetaminophen (NORCO) 5-325 mg per tablet Take 1 tablet by mouth every 6 (six) hours as needed (Not relieved by Anti-inflammatory) for up to 12 dosesMax Daily Amount: 4 tablets, Starting Fri 9/6/2019, Print      hydrOXYzine HCL (ATARAX) 25 mg tablet Take 25 mg by mouth 3 (three) times a day, Historical Med      !! ibuprofen (MOTRIN) 800 mg tablet Take 1 tablet (800 mg total) by mouth every 8 (eight) hours as needed for moderate pain, Starting Wed 7/14/2021, Normal      !! ibuprofen (MOTRIN) 800 mg tablet Take 1 tablet (800 mg total) by mouth 3 (three) times a day, Starting Sat 12/10/2022, Normal      ketorolac (TORADOL) 10 mg tablet Take 1 tablet (10 mg total) by mouth 2 (two) times a day as needed for moderate pain, Starting Tue 5/16/2023, Normal      LORazepam (ATIVAN) 1 mg tablet Take 1 tablet (1 mg total) by mouth 3 (three) times a day as needed for anxiety for up to 5 days, Starting Sat 12/8/2018, Until Sat 11/16/2019, Print      Magnesium Hydroxide (MAGNESIA PO) Take by mouth, Historical Med      magnesium oxide (MAG-OX) 400 mg Take 1 tablet (400 mg total) by mouth 2 (two) times a day for 14 days, Starting Fri 10/2/2020, Until Fri 10/16/2020, Normal      Melatonin 10 MG CAPS TAKE 1 CAPSULE BY MOUTH EVERYDAY AT BEDTIME, Historical Med      !! methocarbamol (ROBAXIN) 500 mg tablet Take 1 tablet (500 mg total) by mouth 3 (three) times a day as needed for muscle spasms, Starting Wed 7/14/2021, Normal      !! methocarbamol (ROBAXIN) 500 mg tablet Take 1 tablet (500 mg total) by mouth 2 (two) times a day, Starting Sat 12/10/2022, Normal      methylPREDNISolone (MEDROL) 4 MG tablet therapy pack Use as  directed on package, Print      nitrofurantoin (MACROBID) 100 mg capsule Take 1 capsule (100 mg total) by mouth 2 (two) times a day, Starting Tue 4/16/2024, Normal      oxyCODONE-acetaminophen (PERCOCET) 5-325 mg per tablet Take 1 tablet by mouth every 8 (eight) hours as needed for moderate painMax Daily Amount: 3 tablets, Starting Mon 2/24/2020, Normal      phenazopyridine (PYRIDIUM) 200 mg tablet Take 1 tablet (200 mg total) by mouth 3 (three) times a day, Starting Tue 4/16/2024, Normal      predniSONE 20 mg tablet Take 2 tablets (40 mg total) by mouth daily, Starting Mon 7/13/2020, Print       !! - Potential duplicate medications found. Please discuss with provider.          No discharge procedures on file.    PDMP Review       None            ED Provider  Electronically Signed by             Oz Norton PA-C  04/22/24 3527

## 2024-09-02 ENCOUNTER — HOSPITAL ENCOUNTER (EMERGENCY)
Facility: HOSPITAL | Age: 39
Discharge: HOME | End: 2024-09-02
Attending: EMERGENCY MEDICINE
Payer: COMMERCIAL

## 2024-09-02 ENCOUNTER — APPOINTMENT (EMERGENCY)
Dept: CT IMAGING | Facility: HOSPITAL | Age: 39
End: 2024-09-02
Payer: COMMERCIAL

## 2024-09-02 ENCOUNTER — APPOINTMENT (EMERGENCY)
Dept: RADIOLOGY | Facility: HOSPITAL | Age: 39
End: 2024-09-02
Payer: COMMERCIAL

## 2024-09-02 VITALS
HEART RATE: 59 BPM | DIASTOLIC BLOOD PRESSURE: 83 MMHG | OXYGEN SATURATION: 100 % | RESPIRATION RATE: 16 BRPM | TEMPERATURE: 97.9 F | SYSTOLIC BLOOD PRESSURE: 115 MMHG

## 2024-09-02 DIAGNOSIS — R51.9 HEADACHE: Primary | ICD-10-CM

## 2024-09-02 LAB
ALBUMIN SERPL BCG-MCNC: 4.4 G/DL (ref 3.5–5)
ALP SERPL-CCNC: 46 U/L (ref 34–104)
ALT SERPL W P-5'-P-CCNC: 10 U/L (ref 7–52)
ANION GAP SERPL CALCULATED.3IONS-SCNC: 8 MMOL/L (ref 4–13)
AST SERPL W P-5'-P-CCNC: 15 U/L (ref 13–39)
ATRIAL RATE: 77 BPM
ATRIAL RATE: 83 BPM
BASOPHILS # BLD AUTO: 0.03 THOUSANDS/ÂΜL (ref 0–0.1)
BASOPHILS NFR BLD AUTO: 1 % (ref 0–1)
BILIRUB SERPL-MCNC: 0.93 MG/DL (ref 0.2–1)
BUN SERPL-MCNC: 10 MG/DL (ref 5–25)
CALCIUM SERPL-MCNC: 9.1 MG/DL (ref 8.4–10.2)
CARDIAC TROPONIN I PNL SERPL HS: <2 NG/L
CARDIAC TROPONIN I PNL SERPL HS: <2 NG/L
CHLORIDE SERPL-SCNC: 107 MMOL/L (ref 96–108)
CO2 SERPL-SCNC: 22 MMOL/L (ref 21–32)
CREAT SERPL-MCNC: 0.67 MG/DL (ref 0.6–1.3)
EOSINOPHIL # BLD AUTO: 0.19 THOUSAND/ÂΜL (ref 0–0.61)
EOSINOPHIL NFR BLD AUTO: 5 % (ref 0–6)
ERYTHROCYTE [DISTWIDTH] IN BLOOD BY AUTOMATED COUNT: 17.2 % (ref 11.6–15.1)
FLUAV RNA RESP QL NAA+PROBE: NEGATIVE
FLUBV RNA RESP QL NAA+PROBE: NEGATIVE
GFR SERPL CREATININE-BSD FRML MDRD: 109 ML/MIN/1.73SQ M
GLUCOSE SERPL-MCNC: 87 MG/DL (ref 65–140)
HCG SERPL QL: NEGATIVE
HCT VFR BLD AUTO: 34.1 % (ref 34.8–46.1)
HGB BLD-MCNC: 11.2 G/DL (ref 11.5–15.4)
IMM GRANULOCYTES # BLD AUTO: 0.01 THOUSAND/UL (ref 0–0.2)
IMM GRANULOCYTES NFR BLD AUTO: 0 % (ref 0–2)
LYMPHOCYTES # BLD AUTO: 1.7 THOUSANDS/ÂΜL (ref 0.6–4.47)
LYMPHOCYTES NFR BLD AUTO: 47 % (ref 14–44)
MCH RBC QN AUTO: 24.4 PG (ref 26.8–34.3)
MCHC RBC AUTO-ENTMCNC: 32.8 G/DL (ref 31.4–37.4)
MCV RBC AUTO: 74 FL (ref 82–98)
MONOCYTES # BLD AUTO: 0.48 THOUSAND/ÂΜL (ref 0.17–1.22)
MONOCYTES NFR BLD AUTO: 13 % (ref 4–12)
NEUTROPHILS # BLD AUTO: 1.26 THOUSANDS/ÂΜL (ref 1.85–7.62)
NEUTS SEG NFR BLD AUTO: 34 % (ref 43–75)
NRBC BLD AUTO-RTO: 0 /100 WBCS
P AXIS: 90 DEGREES
P AXIS: 90 DEGREES
PLATELET # BLD AUTO: 361 THOUSANDS/UL (ref 149–390)
PMV BLD AUTO: 10.6 FL (ref 8.9–12.7)
POTASSIUM SERPL-SCNC: 3.7 MMOL/L (ref 3.5–5.3)
PR INTERVAL: 138 MS
PR INTERVAL: 138 MS
PROT SERPL-MCNC: 7.3 G/DL (ref 6.4–8.4)
QRS AXIS: 82 DEGREES
QRS AXIS: 84 DEGREES
QRSD INTERVAL: 94 MS
QRSD INTERVAL: 96 MS
QT INTERVAL: 376 MS
QT INTERVAL: 386 MS
QTC INTERVAL: 436 MS
QTC INTERVAL: 441 MS
RBC # BLD AUTO: 4.59 MILLION/UL (ref 3.81–5.12)
RSV RNA RESP QL NAA+PROBE: NEGATIVE
SARS-COV-2 RNA RESP QL NAA+PROBE: NEGATIVE
SODIUM SERPL-SCNC: 137 MMOL/L (ref 135–147)
T WAVE AXIS: 71 DEGREES
T WAVE AXIS: 76 DEGREES
VENTRICULAR RATE: 77 BPM
VENTRICULAR RATE: 83 BPM
WBC # BLD AUTO: 3.67 THOUSAND/UL (ref 4.31–10.16)

## 2024-09-02 PROCEDURE — 85025 COMPLETE CBC W/AUTO DIFF WBC: CPT | Performed by: EMERGENCY MEDICINE

## 2024-09-02 PROCEDURE — 99284 EMERGENCY DEPT VISIT MOD MDM: CPT

## 2024-09-02 PROCEDURE — 93005 ELECTROCARDIOGRAM TRACING: CPT

## 2024-09-02 PROCEDURE — 70498 CT ANGIOGRAPHY NECK: CPT

## 2024-09-02 PROCEDURE — 36415 COLL VENOUS BLD VENIPUNCTURE: CPT | Performed by: EMERGENCY MEDICINE

## 2024-09-02 PROCEDURE — 99285 EMERGENCY DEPT VISIT HI MDM: CPT | Performed by: EMERGENCY MEDICINE

## 2024-09-02 PROCEDURE — 0241U HB NFCT DS VIR RESP RNA 4 TRGT: CPT | Performed by: EMERGENCY MEDICINE

## 2024-09-02 PROCEDURE — 84484 ASSAY OF TROPONIN QUANT: CPT | Performed by: EMERGENCY MEDICINE

## 2024-09-02 PROCEDURE — 71046 X-RAY EXAM CHEST 2 VIEWS: CPT

## 2024-09-02 PROCEDURE — 70496 CT ANGIOGRAPHY HEAD: CPT

## 2024-09-02 PROCEDURE — 80053 COMPREHEN METABOLIC PANEL: CPT | Performed by: EMERGENCY MEDICINE

## 2024-09-02 PROCEDURE — 93010 ELECTROCARDIOGRAM REPORT: CPT | Performed by: STUDENT IN AN ORGANIZED HEALTH CARE EDUCATION/TRAINING PROGRAM

## 2024-09-02 PROCEDURE — 84703 CHORIONIC GONADOTROPIN ASSAY: CPT | Performed by: EMERGENCY MEDICINE

## 2024-09-02 RX ADMIN — IOHEXOL 85 ML: 350 INJECTION, SOLUTION INTRAVENOUS at 15:08

## 2024-09-02 NOTE — ED PROVIDER NOTES
History  Chief Complaint   Patient presents with    Dizziness     Patient having headache, dizziness, blurred vision since this past Saturday at 1400. C/O nausea but not vomiting. Symptoms worsening when she eats.      Patient is a 39-year-old female past medical history of fibromyalgia, migraine, anxiety, rheumatoid arthritis, asthma presenting with headache and dizziness.  Patient has frontal headache radiating into her neck intermittent for the last 2 days associated with dizziness.  She states that she does have photophobia but denies photophobia and states that she does feel like the room is spinning.  Notes nausea but denies vomiting and denies any head injuries, fevers, rashes, numbness tingling weakness, chest pain.  Has been taking Tylenol with some relief, last dose yesterday.  Does note intermittent shortness of breath in same timeframe.        Prior to Admission Medications   Prescriptions Last Dose Informant Patient Reported? Taking?   EPINEPHrine (EPIPEN) 0.3 mg/0.3 mL SOAJ   No No   Sig: Inject 0.3 mL into the shoulder, thigh, or buttocks once for 1 dose If needed for allergic reaction.  Proceed immediately to ED if you use the EpiPen.   HYDROcodone-acetaminophen (NORCO) 5-325 mg per tablet  Self No No   Sig: Take 1 tablet by mouth every 6 (six) hours as needed (Not relieved by Anti-inflammatory) for up to 12 dosesMax Daily Amount: 4 tablets   Patient not taking: Reported on 10/2/2020   LORazepam (ATIVAN) 1 mg tablet   No No   Sig: Take 1 tablet (1 mg total) by mouth 3 (three) times a day as needed for anxiety for up to 5 days   Magnesium Hydroxide (MAGNESIA PO)  Self Yes No   Sig: Take by mouth   Melatonin 10 MG CAPS  Self Yes No   Sig: TAKE 1 CAPSULE BY MOUTH EVERYDAY AT BEDTIME   baclofen 10 mg tablet  Self No No   Sig: Take 1 tablet (10 mg total) by mouth 3 (three) times a day as needed for muscle spasms   diazepam (VALIUM) 5 mg tablet  Self No No   Sig: Take 1 tablet (5 mg total) by mouth every 8  (eight) hours as needed for anxiety   diclofenac (VOLTAREN) 75 mg EC tablet   No No   Sig: Take 1 tablet (75 mg total) by mouth 2 (two) times a day   diclofenac sodium (VOLTAREN) 1 %  Self No No   Sig: Apply 2 g topically 4 (four) times a day as needed (pain)   Patient not taking: Reported on 10/2/2020   dicyclomine (BENTYL) 20 mg tablet   No No   Sig: Take 1 tablet (20 mg total) by mouth 2 (two) times a day for 3 days   etanercept (ENBREL) 50 mg/mL SOSY  Self Yes No   Sig: Inject under the skin once a week   Patient not taking: Reported on 6/12/2023   hydrOXYzine HCL (ATARAX) 25 mg tablet  Self Yes No   Sig: Take 25 mg by mouth 3 (three) times a day   ibuprofen (MOTRIN) 800 mg tablet  Self No No   Sig: Take 1 tablet (800 mg total) by mouth every 8 (eight) hours as needed for moderate pain   ibuprofen (MOTRIN) 800 mg tablet  Self No No   Sig: Take 1 tablet (800 mg total) by mouth 3 (three) times a day   Patient not taking: Reported on 5/16/2023   ketorolac (TORADOL) 10 mg tablet  Self No No   Sig: Take 1 tablet (10 mg total) by mouth 2 (two) times a day as needed for moderate pain   magnesium oxide (MAG-OX) 400 mg   No No   Sig: Take 1 tablet (400 mg total) by mouth 2 (two) times a day for 14 days   methocarbamol (ROBAXIN) 500 mg tablet  Self No No   Sig: Take 1 tablet (500 mg total) by mouth 3 (three) times a day as needed for muscle spasms   Patient not taking: Reported on 5/16/2023   methocarbamol (ROBAXIN) 500 mg tablet  Self No No   Sig: Take 1 tablet (500 mg total) by mouth 2 (two) times a day   Patient not taking: Reported on 5/16/2023   methylPREDNISolone (MEDROL) 4 MG tablet therapy pack  Self No No   Sig: Use as directed on package   Patient not taking: Reported on 6/12/2023   nitrofurantoin (MACROBID) 100 mg capsule   No No   Sig: Take 1 capsule (100 mg total) by mouth 2 (two) times a day   ondansetron (ZOFRAN-ODT) 4 mg disintegrating tablet   No No   Sig: Take 1 tablet (4 mg total) by mouth every 6 (six)  hours as needed for nausea or vomiting   oxyCODONE-acetaminophen (PERCOCET) 5-325 mg per tablet  Self No No   Sig: Take 1 tablet by mouth every 8 (eight) hours as needed for moderate painMax Daily Amount: 3 tablets   Patient not taking: Reported on 6/12/2023   phenazopyridine (PYRIDIUM) 200 mg tablet   No No   Sig: Take 1 tablet (200 mg total) by mouth 3 (three) times a day   predniSONE 20 mg tablet  Self No No   Sig: Take 2 tablets (40 mg total) by mouth daily   Patient not taking: Reported on 5/16/2023      Facility-Administered Medications: None       Past Medical History:   Diagnosis Date    Anxiety     Arthritis     Asthma     Fibromyalgia     Panic attack     Rheumatoid arthritis (HCC)        Past Surgical History:   Procedure Laterality Date    CARPAL TUNNEL RELEASE Right     TUBAL LIGATION      TUBAL LIGATION         No family history on file.  I have reviewed and agree with the history as documented.    E-Cigarette/Vaping    E-Cigarette Use Never User      E-Cigarette/Vaping Substances     Social History     Tobacco Use    Smoking status: Never    Smokeless tobacco: Never   Vaping Use    Vaping status: Never Used   Substance Use Topics    Alcohol use: Yes     Comment: occ    Drug use: No       Review of Systems   All other systems reviewed and are negative.      Physical Exam  Physical Exam  Vitals reviewed.   Constitutional:       General: She is not in acute distress.     Appearance: Normal appearance. She is not ill-appearing.   HENT:      Mouth/Throat:      Mouth: Mucous membranes are moist.   Eyes:      Extraocular Movements: Extraocular movements intact.      Conjunctiva/sclera: Conjunctivae normal.      Pupils: Pupils are equal, round, and reactive to light.   Cardiovascular:      Rate and Rhythm: Normal rate and regular rhythm.      Pulses: Normal pulses.      Heart sounds: Normal heart sounds.   Pulmonary:      Effort: Pulmonary effort is normal.      Breath sounds: Normal breath sounds.    Abdominal:      General: Abdomen is flat.      Palpations: Abdomen is soft.      Tenderness: There is no abdominal tenderness.   Musculoskeletal:         General: No swelling. Normal range of motion.      Cervical back: Normal range of motion and neck supple.      Right lower leg: No edema.      Left lower leg: No edema.   Skin:     General: Skin is warm and dry.   Neurological:      General: No focal deficit present.      Mental Status: She is alert.      Cranial Nerves: No cranial nerve deficit.      Sensory: No sensory deficit.      Motor: No weakness.   Psychiatric:         Mood and Affect: Mood normal.         Vital Signs  ED Triage Vitals [09/02/24 1251]   Temperature Pulse Respirations Blood Pressure SpO2   97.9 °F (36.6 °C) 73 16 120/86 100 %      Temp Source Heart Rate Source Patient Position - Orthostatic VS BP Location FiO2 (%)   Oral Monitor Sitting Left arm --      Pain Score       8           Vitals:    09/02/24 1251 09/02/24 1430   BP: 120/86 115/83   Pulse: 73 59   Patient Position - Orthostatic VS: Sitting          Visual Acuity  Visual Acuity      Flowsheet Row Most Recent Value   L Pupil Size (mm) 4   R Pupil Size (mm) 4            ED Medications  Medications   iohexol (OMNIPAQUE) 350 MG/ML injection (MULTI-DOSE) 85 mL (85 mL Intravenous Given 9/2/24 1508)       Diagnostic Studies  Results Reviewed       Procedure Component Value Units Date/Time    HS Troponin I 2hr [[id number]] Collected: 09/02/24 1550    Lab Status: Final result Specimen: Blood from Arm, Right Updated: 09/02/24 1618     hs TnI 2hr <2 ng/L      Delta 2hr hsTnI --    FLU/RSV/COVID - if FLU/RSV clinically relevant [[id number]]  (Normal) Collected: 09/02/24 1325    Lab Status: Final result Specimen: Nares from Nose Updated: 09/02/24 1410     SARS-CoV-2 Negative     INFLUENZA A PCR Negative     INFLUENZA B PCR Negative     RSV PCR Negative    Narrative:      This test has been performed using the CoV-2/Flu/RSV plus assay on the  [employer] GeneXpert platform. This test has been validated by the  and verified by the performing laboratory.     This test is designed to amplify and detect the following: nucleocapsid (N), envelope (E), and RNA-dependent RNA polymerase (RdRP) genes of the SARS-CoV-2 genome; matrix (M), basic polymerase (PB2), and acidic protein (PA) segments of the influenza A genome; matrix (M) and non-structural protein (NS) segments of the influenza B genome, and the nucleocapsid genes of RSV A and RSV B.     Positive results are indicative of the presence of Flu A, Flu B, RSV, and/or SARS-CoV-2 RNA. Positive results for SARS-CoV-2 or suspected novel influenza should be reported to state, local, or federal health departments according to local reporting requirements.      All results should be assessed in conjunction with clinical presentation and other laboratory markers for clinical management.     FOR PEDIATRIC PATIENTS - copy/paste COVID Guidelines URL to browser: [email or web address]       HS Troponin 0hr (reflex protocol) [[id number]]  (Normal) Collected: 09/02/24 1325    Lab Status: Final result Specimen: Blood from Arm, Left Updated: 09/02/24 1358     hs TnI 0hr <2 ng/L     hCG, qualitative pregnancy [[id number]]  (Normal) Collected: 09/02/24 1325    Lab Status: Final result Specimen: Blood from Arm, Left Updated: 09/02/24 1356     Preg, Serum Negative    Comprehensive metabolic panel [[id number]] Collected: 09/02/24 1325    Lab Status: Final result Specimen: Blood from Arm, Left Updated: 09/02/24 1347     Sodium 137 mmol/L      Potassium 3.7 mmol/L      Chloride 107 mmol/L      CO2 22 mmol/L      ANION GAP 8 mmol/L      BUN 10 mg/dL      Creatinine 0.67 mg/dL      Glucose 87 mg/dL      Calcium 9.1 mg/dL      AST 15 U/L      ALT 10 U/L      Alkaline Phosphatase 46 U/L      Total Protein 7.3 g/dL      Albumin 4.4 g/dL      Total Bilirubin 0.93 mg/dL      eGFR  109 ml/min/1.73sq m     Narrative:      National Kidney Disease Foundation guidelines for Chronic Kidney Disease (CKD):     Stage 1 with normal or high GFR (GFR > 90 mL/min/1.73 square meters)    Stage 2 Mild CKD (GFR = 60-89 mL/min/1.73 square meters)    Stage 3A Moderate CKD (GFR = 45-59 mL/min/1.73 square meters)    Stage 3B Moderate CKD (GFR = 30-44 mL/min/1.73 square meters)    Stage 4 Severe CKD (GFR = 15-29 mL/min/1.73 square meters)    Stage 5 End Stage CKD (GFR <15 mL/min/1.73 square meters)  Note: GFR calculation is accurate only with a steady state creatinine    CBC and differential [[id number]]  (Abnormal) Collected: 09/02/24 1325    Lab Status: Final result Specimen: Blood from Arm, Left Updated: 09/02/24 1331     WBC 3.67 Thousand/uL      RBC 4.59 Million/uL      Hemoglobin 11.2 g/dL      Hematocrit 34.1 %      MCV 74 fL      MCH 24.4 pg      MCHC 32.8 g/dL      RDW 17.2 %      MPV 10.6 fL      Platelets 361 Thousands/uL      nRBC 0 /100 WBCs      Segmented % 34 %      Immature Grans % 0 %      Lymphocytes % 47 %      Monocytes % 13 %      Eosinophils Relative 5 %      Basophils Relative 1 %      Absolute Neutrophils 1.26 Thousands/µL      Absolute Immature Grans 0.01 Thousand/uL      Absolute Lymphocytes 1.70 Thousands/µL      Absolute Monocytes 0.48 Thousand/µL      Eosinophils Absolute 0.19 Thousand/µL      Basophils Absolute 0.03 Thousands/µL                    CTA head and neck with and without contrast   Final Result by Tristan De Jesus MD (09/02 1631)      CT head:   -No acute vascular territory infarct, intracranial hemorrhage or mass effect.      CTA:   -No large vessel occlusion, high-grade stenosis or dissection.   -1.5 mm right P-comm infundibulum versus aneurysm. Nonemergent neurovascular consult recommended.                  Workstation performed: [id number]         XR chest 2 views   ED Interpretation by Leticia Quinones DO (09/02 1329)   NAD      Final Result by Columba Lau MD (09/03  0850)      No acute consolidation or congestion   Stable chest radiograph               Workstation performed: [id number]                    Procedures  ECG 12 Lead Documentation Only    Date/Time: 9/2/2024 1:09 PM    Performed by: Leticia Quinones DO  Authorized by: Leticia Quinones DO    Patient location:  ED  Previous ECG:     Previous ECG:  Compared to current    Similarity:  No change  Interpretation:     Interpretation: normal    Rate:     ECG rate assessment: normal    Rhythm:     Rhythm: sinus rhythm    Ectopy:     Ectopy: none    QRS:     QRS axis:  Normal    QRS intervals:  Normal  Conduction:     Conduction: abnormal      Abnormal conduction: incomplete RBBB    ST segments:     ST segments:  Normal  T waves:     T waves: normal             ED Course  ED Course as of 09/04/24 0000   Mon Sep 02, 2024   1642 Patient notes improvement of symptoms, have discussed return precautions and incidental finding on CT and they state they understand.                                 SBIRT 20yo+      Flowsheet Row Most Recent Value   Initial Alcohol Screen: US AUDIT-C     1. How often do you have a drink containing alcohol? 0 Filed at: 09/02/2024 1328   2. How many drinks containing alcohol do you have on a typical day you are drinking?  0 Filed at: 09/02/2024 1328   3a. Male UNDER 65: How often do you have five or more drinks on one occasion? 0 Filed at: 09/02/2024 1328   3b. FEMALE Any Age, or MALE 65+: How often do you have 4 or more drinks on one occassion? 0 Filed at: 09/02/2024 1328   Audit-C Score 0 Filed at: 09/02/2024 1328   DREA: How many times in the past year have you...    Used an illegal drug or used a prescription medication for non-medical reasons? Never Filed at: 09/02/2024 1328                      Medical Decision Making  Patient is a 39-year-old female past medical history of fibromyalgia, anxiety, migraine, rheumatoid arthritis, asthma presenting with headache and dizziness.  Patient is  well-appearing at bedside with stable vitals and in no acute distress.  She has no gross amount is on neurologic exam and no other significant physical exam findings.  Will obtain CT CTA of the head and neck to assess for hemorrhage, edema, mass, other intracranial pathology, labs to assess for electrolyte MIs, anemia, give migraine cocktail and reassess.    Amount and/or Complexity of Data Reviewed  Labs: ordered.  Radiology: ordered and independent interpretation performed.    Risk  Prescription drug management.                 Disposition  Final diagnoses:   Headache     Time reflects when diagnosis was documented in both MDM as applicable and the Disposition within this note       Time User Action Codes Description Comment    9/2/2024  4:43 PM Leticia Quinones Add [R51.9] Headache           ED Disposition       ED Disposition   Discharge    Condition   Stable    Date/Time   Mon Sep 2, 2024 1643    Comment   Roseanne Joel discharge to home/self care.                   Follow-up Information       Follow up With Specialties Details Why Contact Info Additional Information    Ludwin Mcgill MD Neurosurgery Schedule an appointment as soon as possible for a visit   [address] [place] [address] [address] [place] [address] [id number] [phone]       [facility] Emergency Department Emergency Medicine  If symptoms worsen [address] [facility] [id number] [phone] [facility] Emergency Department, [address] [place], [place], [address]            Discharge Medication List as of 9/2/2024  4:44 PM        CONTINUE these medications which have NOT CHANGED    Details   baclofen 10 mg tablet Take 1 tablet (10 mg total) by mouth 3 (three) times a day as needed for muscle spasms, Starting Tue 5/16/2023, Normal      diazepam (VALIUM) 5 mg tablet Take 1 tablet (5 mg total) by mouth every 8 (eight) hours as needed for anxiety, Starting Fri 5/12/2023, Print       diclofenac (VOLTAREN) 75 mg EC tablet Take 1 tablet (75 mg total) by mouth 2 (two) times a day, Starting Wed 6/14/2023, Normal      diclofenac sodium (VOLTAREN) 1 % Apply 2 g topically 4 (four) times a day as needed (pain), Starting Tue 12/3/2019, Print      dicyclomine (BENTYL) 20 mg tablet Take 1 tablet (20 mg total) by mouth 2 (two) times a day for 3 days, Starting Sat 5/11/2019, Until Sat 11/16/2019, Print      EPINEPHrine (EPIPEN) 0.3 mg/0.3 mL SOAJ Inject 0.3 mL into the shoulder, thigh, or buttocks once for 1 dose If needed for allergic reaction.  Proceed immediately to ED if you use the EpiPen., Starting Mon 8/21/2017, Print      etanercept (ENBREL) 50 mg/mL SOSY Inject under the skin once a week, Historical Med      HYDROcodone-acetaminophen (NORCO) 5-325 mg per tablet Take 1 tablet by mouth every 6 (six) hours as needed (Not relieved by Anti-inflammatory) for up to 12 dosesMax Daily Amount: 4 tablets, Starting Fri 9/6/2019, Print      hydrOXYzine HCL (ATARAX) 25 mg tablet Take 25 mg by mouth 3 (three) times a day, Historical Med      !! ibuprofen (MOTRIN) 800 mg tablet Take 1 tablet (800 mg total) by mouth every 8 (eight) hours as needed for moderate pain, Starting Wed 7/14/2021, Normal      !! ibuprofen (MOTRIN) 800 mg tablet Take 1 tablet (800 mg total) by mouth 3 (three) times a day, Starting Sat 12/10/2022, Normal      ketorolac (TORADOL) 10 mg tablet Take 1 tablet (10 mg total) by mouth 2 (two) times a day as needed for moderate pain, Starting Tue 5/16/2023, Normal      LORazepam (ATIVAN) 1 mg tablet Take 1 tablet (1 mg total) by mouth 3 (three) times a day as needed for anxiety for up to 5 days, Starting Sat 12/8/2018, Until Sat 11/16/2019, Print      Magnesium Hydroxide (MAGNESIA PO) Take by mouth, Historical Med      magnesium oxide (MAG-OX) 400 mg Take 1 tablet (400 mg total) by mouth 2 (two) times a day for 14 days, Starting Fri 10/2/2020, Until Fri 10/16/2020, Normal      Melatonin 10 MG CAPS  TAKE 1 CAPSULE BY MOUTH EVERYDAY AT BEDTIME, Historical Med      !! methocarbamol (ROBAXIN) 500 mg tablet Take 1 tablet (500 mg total) by mouth 3 (three) times a day as needed for muscle spasms, Starting Wed 7/14/2021, Normal      !! methocarbamol (ROBAXIN) 500 mg tablet Take 1 tablet (500 mg total) by mouth 2 (two) times a day, Starting Sat 12/10/2022, Normal      methylPREDNISolone (MEDROL) 4 MG tablet therapy pack Use as directed on package, Print      nitrofurantoin (MACROBID) 100 mg capsule Take 1 capsule (100 mg total) by mouth 2 (two) times a day, Starting Tue 4/16/2024, Normal      ondansetron (ZOFRAN-ODT) 4 mg disintegrating tablet Take 1 tablet (4 mg total) by mouth every 6 (six) hours as needed for nausea or vomiting, Starting Mon 4/22/2024, Normal      oxyCODONE-acetaminophen (PERCOCET) 5-325 mg per tablet Take 1 tablet by mouth every 8 (eight) hours as needed for moderate painMax Daily Amount: 3 tablets, Starting Mon 2/24/2020, Normal      phenazopyridine (PYRIDIUM) 200 mg tablet Take 1 tablet (200 mg total) by mouth 3 (three) times a day, Starting Tue 4/16/2024, Normal      predniSONE 20 mg tablet Take 2 tablets (40 mg total) by mouth daily, Starting Mon 7/13/2020, Print       !! - Potential duplicate medications found. Please discuss with provider.              PDMP Review       None            ED Provider  Electronically Signed by             Leticia Quinones DO  09/04/24 0000

## 2024-09-21 ENCOUNTER — APPOINTMENT (EMERGENCY)
Dept: CT IMAGING | Facility: HOSPITAL | Age: 39
End: 2024-09-21
Payer: COMMERCIAL

## 2024-09-21 ENCOUNTER — HOSPITAL ENCOUNTER (EMERGENCY)
Facility: HOSPITAL | Age: 39
Discharge: HOME | End: 2024-09-21
Attending: EMERGENCY MEDICINE
Payer: COMMERCIAL

## 2024-09-21 VITALS
BODY MASS INDEX: 22.58 KG/M2 | RESPIRATION RATE: 15 BRPM | OXYGEN SATURATION: 100 % | DIASTOLIC BLOOD PRESSURE: 70 MMHG | HEIGHT: 63 IN | WEIGHT: 127.43 LBS | TEMPERATURE: 97.7 F | SYSTOLIC BLOOD PRESSURE: 128 MMHG | HEART RATE: 72 BPM

## 2024-09-21 DIAGNOSIS — R42 VERTIGO: Primary | ICD-10-CM

## 2024-09-21 LAB
ANION GAP SERPL CALCULATED.3IONS-SCNC: 8 MMOL/L (ref 4–13)
ATRIAL RATE: 78 BPM
BASOPHILS # BLD AUTO: 0.04 THOUSANDS/ΜL (ref 0–0.1)
BASOPHILS NFR BLD AUTO: 1 % (ref 0–1)
BUN SERPL-MCNC: 12 MG/DL (ref 5–25)
CALCIUM SERPL-MCNC: 8.4 MG/DL (ref 8.4–10.2)
CARDIAC TROPONIN I PNL SERPL HS: 3 NG/L
CHLORIDE SERPL-SCNC: 108 MMOL/L (ref 96–108)
CO2 SERPL-SCNC: 20 MMOL/L (ref 21–32)
CREAT SERPL-MCNC: 0.6 MG/DL (ref 0.6–1.3)
EOSINOPHIL # BLD AUTO: 0.39 THOUSAND/ΜL (ref 0–0.61)
EOSINOPHIL NFR BLD AUTO: 5 % (ref 0–6)
ERYTHROCYTE [DISTWIDTH] IN BLOOD BY AUTOMATED COUNT: 17.5 % (ref 11.6–15.1)
GFR SERPL CREATININE-BSD FRML MDRD: 113 ML/MIN/1.73SQ M
GLUCOSE SERPL-MCNC: 84 MG/DL (ref 65–140)
HCG SERPL QL: NEGATIVE
HCT VFR BLD AUTO: 33 % (ref 34.8–46.1)
HGB BLD-MCNC: 10.4 G/DL (ref 11.5–15.4)
IMM GRANULOCYTES # BLD AUTO: 0.02 THOUSAND/UL (ref 0–0.2)
IMM GRANULOCYTES NFR BLD AUTO: 0 % (ref 0–2)
LYMPHOCYTES # BLD AUTO: 1.82 THOUSANDS/ΜL (ref 0.6–4.47)
LYMPHOCYTES NFR BLD AUTO: 24 % (ref 14–44)
MCH RBC QN AUTO: 23.7 PG (ref 26.8–34.3)
MCHC RBC AUTO-ENTMCNC: 31.5 G/DL (ref 31.4–37.4)
MCV RBC AUTO: 75 FL (ref 82–98)
MONOCYTES # BLD AUTO: 0.99 THOUSAND/ΜL (ref 0.17–1.22)
MONOCYTES NFR BLD AUTO: 13 % (ref 4–12)
NEUTROPHILS # BLD AUTO: 4.34 THOUSANDS/ΜL (ref 1.85–7.62)
NEUTS SEG NFR BLD AUTO: 57 % (ref 43–75)
NRBC BLD AUTO-RTO: 0 /100 WBCS
P AXIS: 79 DEGREES
PLATELET # BLD AUTO: 280 THOUSANDS/UL (ref 149–390)
PMV BLD AUTO: 11 FL (ref 8.9–12.7)
POTASSIUM SERPL-SCNC: 3.7 MMOL/L (ref 3.5–5.3)
PR INTERVAL: 164 MS
QRS AXIS: 71 DEGREES
QRSD INTERVAL: 92 MS
QT INTERVAL: 386 MS
QTC INTERVAL: 440 MS
RBC # BLD AUTO: 4.38 MILLION/UL (ref 3.81–5.12)
SODIUM SERPL-SCNC: 136 MMOL/L (ref 135–147)
T WAVE AXIS: 57 DEGREES
VENTRICULAR RATE: 78 BPM
WBC # BLD AUTO: 7.6 THOUSAND/UL (ref 4.31–10.16)

## 2024-09-21 PROCEDURE — 70450 CT HEAD/BRAIN W/O DYE: CPT

## 2024-09-21 PROCEDURE — 84484 ASSAY OF TROPONIN QUANT: CPT | Performed by: EMERGENCY MEDICINE

## 2024-09-21 PROCEDURE — 80048 BASIC METABOLIC PNL TOTAL CA: CPT | Performed by: EMERGENCY MEDICINE

## 2024-09-21 PROCEDURE — 85025 COMPLETE CBC W/AUTO DIFF WBC: CPT | Performed by: EMERGENCY MEDICINE

## 2024-09-21 PROCEDURE — 84703 CHORIONIC GONADOTROPIN ASSAY: CPT | Performed by: EMERGENCY MEDICINE

## 2024-09-21 PROCEDURE — 93010 ELECTROCARDIOGRAM REPORT: CPT | Performed by: STUDENT IN AN ORGANIZED HEALTH CARE EDUCATION/TRAINING PROGRAM

## 2024-09-21 PROCEDURE — 99285 EMERGENCY DEPT VISIT HI MDM: CPT | Performed by: EMERGENCY MEDICINE

## 2024-09-21 PROCEDURE — 93005 ELECTROCARDIOGRAM TRACING: CPT

## 2024-09-21 PROCEDURE — 36415 COLL VENOUS BLD VENIPUNCTURE: CPT | Performed by: EMERGENCY MEDICINE

## 2024-09-21 PROCEDURE — 99285 EMERGENCY DEPT VISIT HI MDM: CPT

## 2024-09-21 RX ORDER — MECLIZINE HYDROCHLORIDE 25 MG/1
50 TABLET ORAL ONCE
Status: COMPLETED | OUTPATIENT
Start: 2024-09-21 | End: 2024-09-21

## 2024-09-21 RX ORDER — MECLIZINE HYDROCHLORIDE 25 MG/1
25 TABLET ORAL 3 TIMES DAILY PRN
Qty: 30 TABLET | Refills: 0 | Status: SHIPPED | OUTPATIENT
Start: 2024-09-21

## 2024-09-21 RX ADMIN — MECLIZINE HYDROCHLORIDE 50 MG: 25 TABLET ORAL at 09:12

## 2024-09-21 NOTE — DISCHARGE INSTRUCTIONS
Meclizine every 8 hours if needed for dizziness  Follow-up with your provider for further evaluation  Return worsening symptoms or any problems

## 2024-09-21 NOTE — ED PROVIDER NOTES
1. Vertigo      ED Disposition       ED Disposition   Discharge    Condition   Stable    Date/Time   Sat Sep 21, 2024 10:11 AM    Comment   Roseanne Maldonado discharge to home/self care.                   Assessment & Plan       Medical Decision Making  Amount and/or Complexity of Data Reviewed  Labs: ordered.  Radiology: ordered.    Diagnostic testing 39-year-old female presents emergency department with multiple complaints primarily dizziness also some anterior chest discomfort, EKG and car troponin were negative despite hours of symptoms.  Heart score of 0.  Dizziness improved with meclizine.  CT showed no acute intracranial pathology.  Discussed with patient most consistent with peripheral vertigo.  We discussed outpatient treatment and follow-up we discussed indications to return.                 Medications   meclizine (ANTIVERT) tablet 50 mg (50 mg Oral Given 9/21/24 0912)       History of Present Illness       HPI patient is a 39-year-old female with multiple complaints, she reports a dizziness.  Apparently here earlier in the month with some dizziness apparently had a CTA at that time which showed no intracranial pathology but there was a 1.5 mm right infundibulum versus aneurysm.  Apparently seen by her provider subsequent to that and apparently referred to neurosurgery.  Patient presents now with dizziness describes worsening symptoms with movement of her head and the room somewhat spinning.  She also reports heaviness in her chest somewhat uncomfortable pressure feeling.  Denies shortness of breath.  Symptoms are not worse with deep inspiration.  She denies any focal weakness.  Past medical history of dizziness in the past, fibromyalgia, questionable aneurysm  Family history noncontributory  Social history, non-smoker no history of drug abuse    Review of Systems   Constitutional:  Negative for fever.   HENT:  Negative for congestion.    Eyes:  Negative for pain and redness.   Respiratory:  Negative for  cough and shortness of breath.    Cardiovascular:  Positive for chest pain.   Gastrointestinal:  Negative for abdominal pain and vomiting.   Neurological:  Positive for dizziness.           Objective     ED Triage Vitals [09/21/24 0836]   Temperature Pulse Blood Pressure Respirations SpO2 Patient Position - Orthostatic VS   97.7 °F (36.5 °C) 72 132/75 16 100 % Lying      Temp src Heart Rate Source BP Location FiO2 (%) Pain Score    -- Monitor Right arm -- --        Physical Exam  Vitals and nursing note reviewed.   Constitutional:       Appearance: She is well-developed.   HENT:      Head: Normocephalic.      Right Ear: External ear normal.      Left Ear: External ear normal.      Nose: Nose normal.      Mouth/Throat:      Mouth: Mucous membranes are moist.      Pharynx: Oropharynx is clear.   Eyes:      General: Lids are normal.      Extraocular Movements: Extraocular movements intact.      Pupils: Pupils are equal, round, and reactive to light.   Cardiovascular:      Rate and Rhythm: Normal rate and regular rhythm.      Pulses: Normal pulses.      Heart sounds: Normal heart sounds.   Pulmonary:      Effort: Pulmonary effort is normal. No respiratory distress.   Abdominal:      General: Abdomen is flat. Bowel sounds are normal.   Musculoskeletal:         General: No deformity. Normal range of motion.      Cervical back: Normal range of motion and neck supple.   Skin:     General: Skin is warm and dry.   Neurological:      General: No focal deficit present.      Mental Status: She is alert and oriented to person, place, and time.      GCS: GCS eye subscore is 4. GCS verbal subscore is 5. GCS motor subscore is 6.      Cranial Nerves: Cranial nerves 2-12 are intact.      Sensory: Sensation is intact.      Motor: Motor function is intact.      Coordination: Coordination is intact.   Psychiatric:         Mood and Affect: Mood normal.         Labs Reviewed   CBC AND DIFFERENTIAL - Abnormal       Result Value    WBC 7.60       RBC 4.38      Hemoglobin 10.4 (*)     Hematocrit 33.0 (*)     MCV 75 (*)     MCH 23.7 (*)     MCHC 31.5      RDW 17.5 (*)     MPV 11.0      Platelets 280      nRBC 0      Segmented % 57      Immature Grans % 0      Lymphocytes % 24      Monocytes % 13 (*)     Eosinophils Relative 5      Basophils Relative 1      Absolute Neutrophils 4.34      Absolute Immature Grans 0.02      Absolute Lymphocytes 1.82      Absolute Monocytes 0.99      Eosinophils Absolute 0.39      Basophils Absolute 0.04     BASIC METABOLIC PANEL - Abnormal    Sodium 136      Potassium 3.7      Chloride 108      CO2 20 (*)     ANION GAP 8      BUN 12      Creatinine 0.60      Glucose 84      Calcium 8.4      eGFR 113      Narrative:     [employer] guidelines for Chronic Kidney Disease (CKD):     Stage 1 with normal or high GFR (GFR > 90 mL/min/1.73 square meters)    Stage 2 Mild CKD (GFR = 60-89 mL/min/1.73 square meters)    Stage 3A Moderate CKD (GFR = 45-59 mL/min/1.73 square meters)    Stage 3B Moderate CKD (GFR = 30-44 mL/min/1.73 square meters)    Stage 4 Severe CKD (GFR = 15-29 mL/min/1.73 square meters)    Stage 5 End Stage CKD (GFR <15 mL/min/1.73 square meters)  Note: GFR calculation is accurate only with a steady state creatinine   HS TROPONIN I 0HR - Normal    hs TnI 0hr 3     PREGNANCY TEST (HCG QUALITATIVE) - Normal    Preg, Serum Negative       CT head without contrast   Final Interpretation by Gil Maloney MD (09/21 0910)      No acute intracranial abnormality. There is no subarachnoid hemorrhage                  Workstation performed: [id number]             ECG 12 Lead Documentation Only    Date/Time: 9/21/2024 8:48 AM    Performed by: Fadi Walden MD  Authorized by: Fadi Walden MD    Indications / Diagnosis:  Chest tightness  ECG reviewed by me, the ED Provider: yes    Patient location:  ED  Previous ECG:     Previous ECG:  Compared to current    Comparison ECG info:  September 2, 2024     Similarity:  No change  Interpretation:     Interpretation: normal    Rate:     ECG rate:  78    ECG rate assessment: normal    Rhythm:     Rhythm: sinus rhythm    Comments:      Normal sinus rhythm no acute ST-T wave changes no ST elevations      Diagnostic testing  White count was normal at 7.6 no sign of inflammation hemoglobin was 10.4 consistent with chronic anemia  Electrolytes are within normal limits, normal BUN/creatinine no sign of renal dysfunction  Pregnancy test was negative.  Patient complained of some anterior chest discomfort her cardiac troponin was negative.  Patient complained of dizziness history of questionable aneurysm so CT was performed to rule intracranial bleeding it was negative.      ED Medication and Procedure Management   Prior to Admission Medications   Prescriptions Last Dose Informant Patient Reported? Taking?   EPINEPHrine (EPIPEN) 0.3 mg/0.3 mL SOAJ   No No   Sig: Inject 0.3 mL into the shoulder, thigh, or buttocks once for 1 dose If needed for allergic reaction.  Proceed immediately to ED if you use the EpiPen.   HYDROcodone-acetaminophen (NORCO) 5-325 mg per tablet  Self No No   Sig: Take 1 tablet by mouth every 6 (six) hours as needed (Not relieved by Anti-inflammatory) for up to 12 dosesMax Daily Amount: 4 tablets   Patient not taking: Reported on 10/2/2020   LORazepam (ATIVAN) 1 mg tablet   No No   Sig: Take 1 tablet (1 mg total) by mouth 3 (three) times a day as needed for anxiety for up to 5 days   Magnesium Hydroxide (MAGNESIA PO)  Self Yes No   Sig: Take by mouth   Melatonin 10 MG CAPS  Self Yes No   Sig: TAKE 1 CAPSULE BY MOUTH EVERYDAY AT BEDTIME   baclofen 10 mg tablet  Self No No   Sig: Take 1 tablet (10 mg total) by mouth 3 (three) times a day as needed for muscle spasms   diazepam (VALIUM) 5 mg tablet  Self No No   Sig: Take 1 tablet (5 mg total) by mouth every 8 (eight) hours as needed for anxiety   diclofenac (VOLTAREN) 75 mg EC tablet   No No   Sig: Take 1 tablet  (75 mg total) by mouth 2 (two) times a day   diclofenac sodium (VOLTAREN) 1 %  Self No No   Sig: Apply 2 g topically 4 (four) times a day as needed (pain)   Patient not taking: Reported on 10/2/2020   dicyclomine (BENTYL) 20 mg tablet   No No   Sig: Take 1 tablet (20 mg total) by mouth 2 (two) times a day for 3 days   etanercept (ENBREL) 50 mg/mL SOSY  Self Yes No   Sig: Inject under the skin once a week   Patient not taking: Reported on 6/12/2023   hydrOXYzine HCL (ATARAX) 25 mg tablet  Self Yes No   Sig: Take 25 mg by mouth 3 (three) times a day   ibuprofen (MOTRIN) 800 mg tablet  Self No No   Sig: Take 1 tablet (800 mg total) by mouth every 8 (eight) hours as needed for moderate pain   ibuprofen (MOTRIN) 800 mg tablet  Self No No   Sig: Take 1 tablet (800 mg total) by mouth 3 (three) times a day   Patient not taking: Reported on 5/16/2023   ketorolac (TORADOL) 10 mg tablet  Self No No   Sig: Take 1 tablet (10 mg total) by mouth 2 (two) times a day as needed for moderate pain   magnesium oxide (MAG-OX) 400 mg   No No   Sig: Take 1 tablet (400 mg total) by mouth 2 (two) times a day for 14 days   methocarbamol (ROBAXIN) 500 mg tablet  Self No No   Sig: Take 1 tablet (500 mg total) by mouth 3 (three) times a day as needed for muscle spasms   Patient not taking: Reported on 5/16/2023   methocarbamol (ROBAXIN) 500 mg tablet  Self No No   Sig: Take 1 tablet (500 mg total) by mouth 2 (two) times a day   Patient not taking: Reported on 5/16/2023   methylPREDNISolone (MEDROL) 4 MG tablet therapy pack  Self No No   Sig: Use as directed on package   Patient not taking: Reported on 6/12/2023   nitrofurantoin (MACROBID) 100 mg capsule   No No   Sig: Take 1 capsule (100 mg total) by mouth 2 (two) times a day   ondansetron (ZOFRAN-ODT) 4 mg disintegrating tablet   No No   Sig: Take 1 tablet (4 mg total) by mouth every 6 (six) hours as needed for nausea or vomiting   oxyCODONE-acetaminophen (PERCOCET) 5-325 mg per tablet  Self No  No   Sig: Take 1 tablet by mouth every 8 (eight) hours as needed for moderate painMax Daily Amount: 3 tablets   Patient not taking: Reported on 6/12/2023   phenazopyridine (PYRIDIUM) 200 mg tablet   No No   Sig: Take 1 tablet (200 mg total) by mouth 3 (three) times a day   predniSONE 20 mg tablet  Self No No   Sig: Take 2 tablets (40 mg total) by mouth daily   Patient not taking: Reported on 5/16/2023      Facility-Administered Medications: None     Discharge Medication List as of 9/21/2024 10:11 AM        START taking these medications    Details   meclizine (ANTIVERT) 25 mg tablet Take 1 tablet (25 mg total) by mouth 3 (three) times a day as needed for dizziness, Starting Sat 9/21/2024, Normal           CONTINUE these medications which have NOT CHANGED    Details   baclofen 10 mg tablet Take 1 tablet (10 mg total) by mouth 3 (three) times a day as needed for muscle spasms, Starting Tue 5/16/2023, Normal      diazepam (VALIUM) 5 mg tablet Take 1 tablet (5 mg total) by mouth every 8 (eight) hours as needed for anxiety, Starting Fri 5/12/2023, Print      diclofenac (VOLTAREN) 75 mg EC tablet Take 1 tablet (75 mg total) by mouth 2 (two) times a day, Starting Wed 6/14/2023, Normal      diclofenac sodium (VOLTAREN) 1 % Apply 2 g topically 4 (four) times a day as needed (pain), Starting Tue 12/3/2019, Print      dicyclomine (BENTYL) 20 mg tablet Take 1 tablet (20 mg total) by mouth 2 (two) times a day for 3 days, Starting Sat 5/11/2019, Until Sat 11/16/2019, Print      EPINEPHrine (EPIPEN) 0.3 mg/0.3 mL SOAJ Inject 0.3 mL into the shoulder, thigh, or buttocks once for 1 dose If needed for allergic reaction.  Proceed immediately to ED if you use the EpiPen., Starting Mon 8/21/2017, Print      etanercept (ENBREL) 50 mg/mL SOSY Inject under the skin once a week, Historical Med      HYDROcodone-acetaminophen (NORCO) 5-325 mg per tablet Take 1 tablet by mouth every 6 (six) hours as needed (Not relieved by Anti-inflammatory)  for up to 12 dosesMax Daily Amount: 4 tablets, Starting Fri 9/6/2019, Print      hydrOXYzine HCL (ATARAX) 25 mg tablet Take 25 mg by mouth 3 (three) times a day, Historical Med      !! ibuprofen (MOTRIN) 800 mg tablet Take 1 tablet (800 mg total) by mouth every 8 (eight) hours as needed for moderate pain, Starting Wed 7/14/2021, Normal      !! ibuprofen (MOTRIN) 800 mg tablet Take 1 tablet (800 mg total) by mouth 3 (three) times a day, Starting Sat 12/10/2022, Normal      ketorolac (TORADOL) 10 mg tablet Take 1 tablet (10 mg total) by mouth 2 (two) times a day as needed for moderate pain, Starting Tue 5/16/2023, Normal      LORazepam (ATIVAN) 1 mg tablet Take 1 tablet (1 mg total) by mouth 3 (three) times a day as needed for anxiety for up to 5 days, Starting Sat 12/8/2018, Until Sat 11/16/2019, Print      Magnesium Hydroxide (MAGNESIA PO) Take by mouth, Historical Med      magnesium oxide (MAG-OX) 400 mg Take 1 tablet (400 mg total) by mouth 2 (two) times a day for 14 days, Starting Fri 10/2/2020, Until Fri 10/16/2020, Normal      Melatonin 10 MG CAPS TAKE 1 CAPSULE BY MOUTH EVERYDAY AT BEDTIME, Historical Med      !! methocarbamol (ROBAXIN) 500 mg tablet Take 1 tablet (500 mg total) by mouth 3 (three) times a day as needed for muscle spasms, Starting Wed 7/14/2021, Normal      !! methocarbamol (ROBAXIN) 500 mg tablet Take 1 tablet (500 mg total) by mouth 2 (two) times a day, Starting Sat 12/10/2022, Normal      methylPREDNISolone (MEDROL) 4 MG tablet therapy pack Use as directed on package, Print      nitrofurantoin (MACROBID) 100 mg capsule Take 1 capsule (100 mg total) by mouth 2 (two) times a day, Starting Tue 4/16/2024, Normal      ondansetron (ZOFRAN-ODT) 4 mg disintegrating tablet Take 1 tablet (4 mg total) by mouth every 6 (six) hours as needed for nausea or vomiting, Starting Mon 4/22/2024, Normal      oxyCODONE-acetaminophen (PERCOCET) 5-325 mg per tablet Take 1 tablet by mouth every 8 (eight) hours as  needed for moderate painMax Daily Amount: 3 tablets, Starting Mon 2/24/2020, Normal      phenazopyridine (PYRIDIUM) 200 mg tablet Take 1 tablet (200 mg total) by mouth 3 (three) times a day, Starting Tue 4/16/2024, Normal      predniSONE 20 mg tablet Take 2 tablets (40 mg total) by mouth daily, Starting Mon 7/13/2020, Print       !! - Potential duplicate medications found. Please discuss with provider.        No discharge procedures on file.     Fadi Walden MD  09/21/24 9790

## 2024-10-09 ENCOUNTER — OFFICE VISIT (OUTPATIENT)
Age: 39
End: 2024-10-09
Payer: COMMERCIAL

## 2024-10-09 VITALS
WEIGHT: 125 LBS | DIASTOLIC BLOOD PRESSURE: 60 MMHG | HEIGHT: 63 IN | SYSTOLIC BLOOD PRESSURE: 90 MMHG | OXYGEN SATURATION: 100 % | HEART RATE: 75 BPM | BODY MASS INDEX: 22.15 KG/M2

## 2024-10-09 DIAGNOSIS — R51.9 HEADACHE: ICD-10-CM

## 2024-10-09 DIAGNOSIS — G43.909 MIGRAINE HEADACHE: ICD-10-CM

## 2024-10-09 DIAGNOSIS — R51.0 POSITIONAL HEADACHE: ICD-10-CM

## 2024-10-09 DIAGNOSIS — I72.9 ANEURYSM (HCC): Primary | ICD-10-CM

## 2024-10-09 PROCEDURE — 99205 OFFICE O/P NEW HI 60 MIN: CPT | Performed by: PSYCHIATRY & NEUROLOGY

## 2024-10-09 RX ORDER — SUMATRIPTAN 50 MG/1
50 TABLET, FILM COATED ORAL AS NEEDED
Qty: 9 TABLET | Refills: 2 | Status: SHIPPED | OUTPATIENT
Start: 2024-10-09

## 2024-10-09 RX ORDER — DESOXIMETASONE 0.5 MG/G
CREAM TOPICAL
COMMUNITY

## 2024-10-09 RX ORDER — BUSPIRONE HYDROCHLORIDE 10 MG/1
1 TABLET ORAL 2 TIMES DAILY
COMMUNITY

## 2024-10-09 RX ORDER — FLUOXETINE 40 MG/1
1 CAPSULE ORAL DAILY
COMMUNITY

## 2024-10-09 RX ORDER — ALBUTEROL SULFATE 90 UG/1
2 INHALANT RESPIRATORY (INHALATION) EVERY 6 HOURS PRN
COMMUNITY

## 2024-10-09 RX ORDER — AMITRIPTYLINE HYDROCHLORIDE 10 MG/1
TABLET ORAL
Qty: 90 TABLET | Refills: 3 | Status: SHIPPED | OUTPATIENT
Start: 2024-10-09

## 2024-10-09 RX ORDER — ALPRAZOLAM 1 MG/1
TABLET ORAL
COMMUNITY

## 2024-10-09 RX ORDER — SENNOSIDES 8.6 MG
650 CAPSULE ORAL EVERY 8 HOURS PRN
COMMUNITY

## 2024-10-09 RX ORDER — FOLIC ACID 1 MG/1
1 TABLET ORAL DAILY
COMMUNITY

## 2024-10-09 RX ORDER — BETAMETHASONE DIPROPIONATE 0.5 MG/G
1 CREAM TOPICAL 2 TIMES DAILY
COMMUNITY
Start: 2023-12-11 | End: 2024-12-10

## 2024-10-09 RX ORDER — DOXEPIN HYDROCHLORIDE 25 MG/1
CAPSULE ORAL
COMMUNITY

## 2024-10-09 NOTE — PROGRESS NOTES
"Neurology New Patient Ambulatory Visit Note    Name: Roseanne Maldonado       : 1985       MRN: [id number]   Encounter Provider: Tenisha Chiang MD   Encounter Date: 10/9/2024  Encounter department: [facility] [place]          History of Present illness:     Roseanne Maldonado is a 39 y.o. female presenting with Headache      Headache description:     Onset of headaches: suddenly 2 months back--different from her typical migraine   Location of headaches: bilateral, occipital, and frontal  Radiation: Yes, radiates to the back    Quality of the pain: pressure and throbbing  Intensity of the headache: At present: 4/10;  At its worst:  10/10  Duration of the headaches:week(s)  Frequency of the headaches:daily  Presence of aura:  Yes, vertigo like sensation and chest pain  Associated symptoms with headaches: no vomiting  and +nausea, no light sensitivity, sound sensitivity present--she also feels pressure behind the eyes, sometimes they have swollen.   Triggers:No   Positional component: Yes   Alleviating factors: Yes, ice helps   Any specific time of the day when get the headaches: no particular time of day    Red Flags for headache:  Age <5 or >50: No  First worst headaches: No  Maximal at intensity: sometimes they are intense at the maximum, sometimes she would wake up with the headaches.   Change in pattern: Yes  New headache in pregnancy, cancer or immunocompromised patient: RA (prednisone as needed, previously on Embrel) and Fibromyalgia   Loss of consciousness or convulsions: none  Headache triggered by exertion, Valsalva maneuver or sexual activity: none  Abnormal exam: please see below in Neurological examination.    Social history:   Family history of migraine headaches: Yes, father had migraine headaches  History of neck and head trauma: No  Smoking status: No  Oral contraceptive use: No        Life style factors:  -Hydration: inadequate  -Sleep: \"bad\", she only sleeps for about 4 " "hours.   -Caffeine intake: 1 cups of caffeinated coffee per day(s)  -Alcohol intake: socially       Treatment:  -Over the counter medications tried for headaches:   Tylenol      -Prescription medications:  Abortive or Rescue Medications used:   No rescue medicine tried in the past      Preventative or daily medications used for headaches:   Baclofen does not help with headaches         Other significant co morbidities:  RA on DMT, prednisone as needed.   Asthma  Anxiety  Fibromyalgia         Objective:  BP 90/60 (BP Location: Left arm, Patient Position: Sitting, Cuff Size: Large)   Pulse 75   Ht 5' 3\" (1.6 m)   Wt 56.7 kg (125 lb)   SpO2 100%   BMI 22.14 kg/m²        Neurological examination:     Mental status exam: Alert, oriented to time place and person,      Cranial nerve exam:    I Not tested   II Normal visual fields to finger counting.   II, Ill,  IV, VI Pupils were symmetric, briskly reactive. No afferent pupillary defect.  Eye movements are full without nystagmus. No ptosis.   V Facial sensation were intact   VII Facial movements were symmetrical    VIII Hearing was intact   IX, X Intact   XI Shoulder shrug and head turn was normal   XII Tongue protrusion is in the mid line.          Motor exam      Arm Right  Left Leg Right  Left   Deltoid 5/5 5/5 lliopsoas 5/5 5/5   Biceps 5/5 5/5 Quads 5/5 5/5   Triceps 5/5 5/5 Hamstrings 5/5 5/5   Wrist Extension 5/5 5/5 Ankle Dorsi Flexion 5/5 5/5   Wrist Flexion 5/5 5/5 Ankle Plantar Flexion 5/5 5/5            Deep Tendon reflexes:       Brachioradialis Biceps Triceps Patellar Achilles   Right 2+ 2+ 2+ 1+ 1+   Left 2+ 2+ 2+ 1+ 1+            Sensory exam:  Sensation to dull touch Intact  Sensation to temperature Intact    Coordination:  Normal finger to nose testing  Finger tapping test was normal    Gait and Station:    normal        Pertinent diagnostic testing:  Labs:  Lab Results   Component Value Date    WBC 7.60 09/21/2024     Lab Results   Component Value " "Date    HGB 10.4 (L) 09/21/2024     Lab Results   Component Value Date     09/21/2024     Lab Results   Component Value Date    LYMPHSABS 1.82 09/21/2024     No results found for: \"LABLYMP\"  Lab Results   Component Value Date    EOSABS 0.39 09/21/2024     No components found for: \"NEUTROPHILS\"    No results found for: \"LABMONO\"         No results found for: \"LABGLUC\"  Lab Results   Component Value Date    CREATININE 0.63 10/08/2024     Lab Results   Component Value Date    AST 14 10/08/2024     Lab Results   Component Value Date    ALT 8 10/08/2024     Lab Results   Component Value Date    ALKPHOS 46 10/08/2024     Lab Results   Component Value Date    AST 14 10/08/2024              Neuroimaging:      [facility] 9/21/2024  IMPRESSION:     No acute intracranial abnormality. There is no subarachnoid hemorrhage         Results for orders placed during the hospital encounter of 06/06/23    MRI cervical spine wo contrast    Impression  Normal MRI of the cervical spine.          Workstation performed: [id number]        Assessment & Plan  Migraine headache  Roseanne Maldonado is a 39 y.o. female presenting with probably tension type headaches that are slightly different from her typical migraines and started all of a sudden number 2 months back and seems to be getting worse both in terms of the frequency and intensity.  Due to the red flags of migraine headaches with being immunosuppressed from her prednisone intake, positional component and underlying rheumatoid arthritis I would like to get an MRI of the brain to rule out any intracranial pathology.     In regards to the preventative medication I would recommend starting her on amitriptyline 10 mg at bedtime which she can increase it to 20 mg if no relief to her symptoms.     In regards to rescue medication for her headaches I would recommend Imitrex 50 mg on a as needed basis-- take 1 tablet (50 mg total) by mouth if needed for migraine (Take one tab at the start of the " headache, if no relief can repeat the dose in 2 hours but not more than 3 doses in 24 hours.) for up to 1 dose  -MRI of the brain with and without contrast  Aneurysm (HCC)  On previous CTA head and neck she was found to have a 1.5 mm right P-comm aneurysm for which I am referring her to neurosurgery  -Neurosurgery referral placed  Positional headache  Due to positional component, would get MRI brain.   Orders:    MRI brain w wo contrast; Future          Ms. Maldonado will Return in about 4 months (around 2/9/2025), or Telemedicine.       The management plan was discussed in detail with the patient and all questions were answered.     Ms. Maldonado was encouraged to contact our office with any questions or concerns and to contact the clinic or go to the nearest emergency room if symptoms change or worsen.       Administrative Statements I have spent a total of 62 min in reviewing and/or ordering tests, medications, or procedures, performing an examination or evaluation, reviewing pertinent history, counseling and educating the patient, referring and/or communicating with other health care professionals, documenting in the EMR and general coordination of care of Ms. Maldonado  today.        Tenisha Oliver MD.   Staff Neurologist,   Neuroimmunology and Neuroinfectious disease  10/09/24     This report has been created through the use of voice recognition/text compilation software.  Typographical and content errors may occur with this process.  While efforts are made to detect and correct such errors, in some cases errors will persist.  For this reason, wording in this document should be considered in the proper context and not strictly verbatim.  If, when reviewing the document, an error is discovered, please let the office know at [phone]

## 2024-10-29 ENCOUNTER — HOSPITAL ENCOUNTER (OUTPATIENT)
Dept: MRI IMAGING | Facility: HOSPITAL | Age: 39
Discharge: HOME | End: 2024-10-29
Attending: PSYCHIATRY & NEUROLOGY

## 2024-10-29 DIAGNOSIS — I72.9 ANEURYSM (HCC): ICD-10-CM

## 2024-10-29 DIAGNOSIS — G43.909 MIGRAINE HEADACHE: ICD-10-CM

## 2024-10-29 DIAGNOSIS — R51.0 POSITIONAL HEADACHE: ICD-10-CM

## 2024-11-01 ENCOUNTER — HOSPITAL ENCOUNTER (OUTPATIENT)
Dept: MRI IMAGING | Facility: HOSPITAL | Age: 39
End: 2024-11-01
Attending: PSYCHIATRY & NEUROLOGY
Payer: COMMERCIAL

## 2024-11-01 PROCEDURE — A9585 GADOBUTROL INJECTION: HCPCS | Performed by: PSYCHIATRY & NEUROLOGY

## 2024-11-01 PROCEDURE — 70553 MRI BRAIN STEM W/O & W/DYE: CPT

## 2024-11-01 RX ORDER — GADOBUTROL 604.72 MG/ML
5 INJECTION INTRAVENOUS
Status: COMPLETED | OUTPATIENT
Start: 2024-11-01 | End: 2024-11-01

## 2024-11-01 RX ADMIN — GADOBUTROL 5 ML: 604.72 INJECTION INTRAVENOUS at 18:07

## 2024-11-04 ENCOUNTER — CONSULT (OUTPATIENT)
Dept: NEUROSURGERY | Facility: CLINIC | Age: 39
End: 2024-11-04
Payer: COMMERCIAL

## 2024-11-04 VITALS
HEIGHT: 63 IN | OXYGEN SATURATION: 99 % | BODY MASS INDEX: 22.15 KG/M2 | SYSTOLIC BLOOD PRESSURE: 100 MMHG | WEIGHT: 125 LBS | DIASTOLIC BLOOD PRESSURE: 70 MMHG | TEMPERATURE: 98.1 F | HEART RATE: 76 BPM

## 2024-11-04 DIAGNOSIS — I72.9 ANEURYSM (HCC): ICD-10-CM

## 2024-11-04 DIAGNOSIS — R42 VERTIGO: Primary | ICD-10-CM

## 2024-11-04 PROCEDURE — 99243 OFF/OP CNSLTJ NEW/EST LOW 30: CPT | Performed by: PHYSICIAN ASSISTANT

## 2024-11-04 RX ORDER — TOCILIZUMAB 20 MG/ML
INJECTION, SOLUTION, CONCENTRATE INTRAVENOUS
COMMUNITY

## 2024-11-04 RX ORDER — METHYLPREDNISOLONE 4 MG/1
TABLET ORAL
COMMUNITY
Start: 2024-10-15

## 2024-11-04 RX ORDER — PANTOPRAZOLE SODIUM 40 MG/1
40 TABLET, DELAYED RELEASE ORAL DAILY
COMMUNITY

## 2024-11-04 RX ORDER — METHOTREXATE 2.5 MG/1
TABLET ORAL
COMMUNITY

## 2024-11-04 RX ORDER — OXYBUTYNIN CHLORIDE 10 MG/1
10 TABLET, EXTENDED RELEASE ORAL DAILY
COMMUNITY
Start: 2024-10-21 | End: 2025-10-21

## 2024-11-04 NOTE — PROGRESS NOTES
Ambulatory Visit  Name: Roseanne Maldonado      : 1985      MRN: [id number]  Encounter Provider: Cam Mendenhall PA-C  Encounter Date: 2024   Encounter department: [facility] [place]    Assessment & Plan  Aneurysm (HCC)  Patient is a 39 yrs old pleasant woman here today referred by Neurology for incidental 1.5 mm Right Pcom Aneurysm vs infundibulum. The lesion was detected for workups of dizziness, vertigo, headache and lightheadedness.  Patient has history of rheumatoid arthritis on methotrexate, fibromyalgia, migraine, asthma, and anxiety.  Patient reported Hx of migraine headache, but few months ago she started experiencing dizziness, lightheadedness, sometimes vertigo associated with nausea.  She denies any family history of aneurysm, history of hypertension, or cigarette smoking.  Exam-patient is [place]-speaker, her  translates, A&OX3, PERRL, EOMI [place], strength 5/5, sensation to LT intact bilaterally. DTR 2+ no clonus bilaterally. Gait stable on heel to toes walking.  Imagings:    CTA head and neck with and without contrast 2024 demonstrates no acute vascular territory infarct, intracranial hemorrhage or mass effect.  No large vessel occlusion, high-grade stenosis or dissection.  A 1.5 mm right P-comm infundibulum versus aneurysm.       Plan    Surveillance CTA head with and without contrast in 6 months    Ambulatory [place] to otolaryngology for dizziness and vertigo evaluation and management  Advised patient to call office or go to emergency room with development of new neurological symptoms, worsening severe headache associated with neck pain, etc  Otherwise, follow-up in 6 months with CTA completion  Call with question or concern  Orders:    Ambulatory referral to Neurosurgery    CTA head w wo contrast; Future    Vertigo  Patient was on meclizine  Ambulatory referral to otolaryngology  Orders:    Ambulatory Referral to Otolaryngology; Future      History of  "Present Illness   See detailed discussion above  Review of Systems   Constitutional:  Positive for fatigue (occasionally feels like she will pass out).   HENT: Negative.     Eyes: Negative.    Respiratory: Negative.     Cardiovascular: Negative.    Gastrointestinal: Negative.    Endocrine: Negative.    Genitourinary:  Positive for frequency.   Musculoskeletal: Negative.    Skin: Negative.    Allergic/Immunologic: Negative.    Neurological:  Positive for dizziness, speech difficulty (finding words), weakness, light-headedness, numbness and headaches.        Consult- Possible Aneurysm  CT head 9/21/24   MRI Brain 11/1/24  C/O Headaches & dizziness x 2 mo  Nausea x 2 mo  Blurred vision both eyes x 2 mo  Chest pain x 2 mo  N/T/W BUE radiate into hand/fingers  No AC/Non smoker/No previous brain sx       Hematological: Negative.    Psychiatric/Behavioral: Negative.     All other systems reviewed and are negative.    I have personally reviewed the MA's review of systems and made changes as necessary.      Objective     /70   Pulse 76   Temp 98.1 °F (36.7 °C)   Ht 5' 3\" (1.6 m)   Wt 56.7 kg (125 lb)   LMP 10/16/2024   SpO2 99%   BMI 22.14 kg/m²     Physical Exam  Constitutional:       Appearance: Normal appearance.   Eyes:      Extraocular Movements: Extraocular movements intact.      Pupils: Pupils are equal, round, and reactive to light.   Pulmonary:      Effort: Pulmonary effort is normal.   Musculoskeletal:         General: Normal range of motion.      Cervical back: Normal range of motion.   Neurological:      General: No focal deficit present.      Mental Status: She is alert and oriented to person, place, and time.      Deep Tendon Reflexes:      Reflex Scores:       Tricep reflexes are 2+ on the right side and 2+ on the left side.       Bicep reflexes are 2+ on the right side and 2+ on the left side.       Brachioradialis reflexes are 2+ on the right side and 2+ on the left side.       Patellar reflexes " are 2+ on the right side and 2+ on the left side.       Achilles reflexes are 2+ on the right side and 2+ on the left side.  Psychiatric:         Speech: Speech normal.       Neurologic Exam     Mental Status   Oriented to person, place, and time.   Speech: speech is normal   Level of consciousness: alert    Cranial Nerves     CN III, IV, VI   Pupils are equal, round, and reactive to light.  Right pupil: Size: 3 mm. Shape: regular. Reactivity: brisk.   Left pupil: Size: 3 mm. Shape: regular. Reactivity: brisk.   Nystagmus: none     CN XI   CN XI normal.     Motor Exam   Muscle bulk: normal  Overall muscle tone: normal  Right arm tone: normal  Left arm tone: normal  Right arm pronator drift: absent  Left arm pronator drift: absent  Right leg tone: normal  Left leg tone: normal    Sensory Exam   Light touch normal.     Gait, Coordination, and Reflexes     Reflexes   Right brachioradialis: 2+  Left brachioradialis: 2+  Right biceps: 2+  Left biceps: 2+  Right triceps: 2+  Left triceps: 2+  Right patellar: 2+  Left patellar: 2+  Right achilles: 2+  Left achilles: 2+  Right : 2+  Left : 2+  Right Rivera: absent  Right ankle clonus: absent  Left ankle clonus: absent      I personally reviewed the following image studies in PACS and associated radiology reports: CT A. My interpretation of the radiology images/reports is: Reviewed the image with the patient and her , findings as described in the assessment section above.    Administrative Statements   I have spent a total time of 30 minutes in caring for this patient on the day of the visit/encounter including Diagnostic results, Prognosis, Risks and benefits of tx options, Instructions for management, Patient and family education, Importance of tx compliance, Risk factor reductions, Impressions, Counseling / Coordination of care, Documenting in the medical record, Reviewing / ordering tests, medicine, procedures  , and Obtaining or reviewing history  .

## 2025-01-09 ENCOUNTER — APPOINTMENT (EMERGENCY)
Dept: CT IMAGING | Facility: HOSPITAL | Age: 40
End: 2025-01-09
Payer: COMMERCIAL

## 2025-01-09 ENCOUNTER — HOSPITAL ENCOUNTER (EMERGENCY)
Facility: HOSPITAL | Age: 40
Discharge: HOME | End: 2025-01-10
Attending: EMERGENCY MEDICINE
Payer: COMMERCIAL

## 2025-01-09 VITALS
OXYGEN SATURATION: 100 % | HEART RATE: 70 BPM | SYSTOLIC BLOOD PRESSURE: 112 MMHG | TEMPERATURE: 98.2 F | WEIGHT: 127 LBS | BODY MASS INDEX: 22.5 KG/M2 | DIASTOLIC BLOOD PRESSURE: 73 MMHG | RESPIRATION RATE: 20 BRPM

## 2025-01-09 DIAGNOSIS — R51.9 HEADACHE: Primary | ICD-10-CM

## 2025-01-09 LAB
ALBUMIN SERPL BCG-MCNC: 4.3 G/DL (ref 3.5–5)
ALP SERPL-CCNC: 62 U/L (ref 34–104)
ALT SERPL W P-5'-P-CCNC: 7 U/L (ref 7–52)
ANION GAP SERPL CALCULATED.3IONS-SCNC: 7 MMOL/L (ref 4–13)
AST SERPL W P-5'-P-CCNC: 11 U/L (ref 13–39)
BASOPHILS # BLD AUTO: 0.04 THOUSANDS/ΜL (ref 0–0.1)
BASOPHILS NFR BLD AUTO: 1 % (ref 0–1)
BILIRUB SERPL-MCNC: 0.39 MG/DL (ref 0.2–1)
BUN SERPL-MCNC: 19 MG/DL (ref 5–25)
CALCIUM SERPL-MCNC: 9.2 MG/DL (ref 8.4–10.2)
CHLORIDE SERPL-SCNC: 105 MMOL/L (ref 96–108)
CO2 SERPL-SCNC: 26 MMOL/L (ref 21–32)
CREAT SERPL-MCNC: 0.65 MG/DL (ref 0.6–1.3)
EOSINOPHIL # BLD AUTO: 0.2 THOUSAND/ΜL (ref 0–0.61)
EOSINOPHIL NFR BLD AUTO: 3 % (ref 0–6)
ERYTHROCYTE [DISTWIDTH] IN BLOOD BY AUTOMATED COUNT: 14.2 % (ref 11.6–15.1)
GFR SERPL CREATININE-BSD FRML MDRD: 110 ML/MIN/1.73SQ M
GLUCOSE SERPL-MCNC: 102 MG/DL (ref 65–140)
HCT VFR BLD AUTO: 42 % (ref 34.8–46.1)
HGB BLD-MCNC: 14 G/DL (ref 11.5–15.4)
IMM GRANULOCYTES # BLD AUTO: 0.01 THOUSAND/UL (ref 0–0.2)
IMM GRANULOCYTES NFR BLD AUTO: 0 % (ref 0–2)
LYMPHOCYTES # BLD AUTO: 1.61 THOUSANDS/ΜL (ref 0.6–4.47)
LYMPHOCYTES NFR BLD AUTO: 21 % (ref 14–44)
MCH RBC QN AUTO: 28.7 PG (ref 26.8–34.3)
MCHC RBC AUTO-ENTMCNC: 33.3 G/DL (ref 31.4–37.4)
MCV RBC AUTO: 86 FL (ref 82–98)
MONOCYTES # BLD AUTO: 0.61 THOUSAND/ΜL (ref 0.17–1.22)
MONOCYTES NFR BLD AUTO: 8 % (ref 4–12)
NEUTROPHILS # BLD AUTO: 5.3 THOUSANDS/ΜL (ref 1.85–7.62)
NEUTS SEG NFR BLD AUTO: 67 % (ref 43–75)
NRBC BLD AUTO-RTO: 0 /100 WBCS
PLATELET # BLD AUTO: 293 THOUSANDS/UL (ref 149–390)
PMV BLD AUTO: 10.8 FL (ref 8.9–12.7)
POTASSIUM SERPL-SCNC: 3.5 MMOL/L (ref 3.5–5.3)
PROT SERPL-MCNC: 7 G/DL (ref 6.4–8.4)
RBC # BLD AUTO: 4.88 MILLION/UL (ref 3.81–5.12)
SODIUM SERPL-SCNC: 138 MMOL/L (ref 135–147)
WBC # BLD AUTO: 7.77 THOUSAND/UL (ref 4.31–10.16)

## 2025-01-09 PROCEDURE — 70498 CT ANGIOGRAPHY NECK: CPT

## 2025-01-09 PROCEDURE — 81025 URINE PREGNANCY TEST: CPT | Performed by: EMERGENCY MEDICINE

## 2025-01-09 PROCEDURE — 99283 EMERGENCY DEPT VISIT LOW MDM: CPT

## 2025-01-09 PROCEDURE — 85025 COMPLETE CBC W/AUTO DIFF WBC: CPT | Performed by: EMERGENCY MEDICINE

## 2025-01-09 PROCEDURE — 70496 CT ANGIOGRAPHY HEAD: CPT

## 2025-01-09 PROCEDURE — 36415 COLL VENOUS BLD VENIPUNCTURE: CPT | Performed by: EMERGENCY MEDICINE

## 2025-01-09 PROCEDURE — 80053 COMPREHEN METABOLIC PANEL: CPT | Performed by: EMERGENCY MEDICINE

## 2025-01-09 RX ADMIN — IOHEXOL 100 ML: 350 INJECTION, SOLUTION INTRAVENOUS at 22:19

## 2025-01-10 LAB
BACTERIA UR QL AUTO: ABNORMAL /HPF
BILIRUB UR QL STRIP: NEGATIVE
CLARITY UR: CLEAR
COLOR UR: ABNORMAL
EXT PREGNANCY TEST URINE: NEGATIVE
EXT. CONTROL: NORMAL
GLUCOSE UR STRIP-MCNC: NEGATIVE MG/DL
HGB UR QL STRIP.AUTO: NEGATIVE
KETONES UR STRIP-MCNC: NEGATIVE MG/DL
LEUKOCYTE ESTERASE UR QL STRIP: NEGATIVE
MUCOUS THREADS UR QL AUTO: ABNORMAL
NITRITE UR QL STRIP: NEGATIVE
NON-SQ EPI CELLS URNS QL MICRO: ABNORMAL /HPF
PH UR STRIP.AUTO: 6 [PH]
PROT UR STRIP-MCNC: ABNORMAL MG/DL
RBC #/AREA URNS AUTO: ABNORMAL /HPF
SP GR UR STRIP.AUTO: >=1.05 (ref 1–1.03)
UROBILINOGEN UR STRIP-ACNC: <2 MG/DL
WBC #/AREA URNS AUTO: ABNORMAL /HPF

## 2025-01-10 PROCEDURE — 96365 THER/PROPH/DIAG IV INF INIT: CPT

## 2025-01-10 PROCEDURE — 81001 URINALYSIS AUTO W/SCOPE: CPT | Performed by: EMERGENCY MEDICINE

## 2025-01-10 PROCEDURE — 96375 TX/PRO/DX INJ NEW DRUG ADDON: CPT

## 2025-01-10 PROCEDURE — 99285 EMERGENCY DEPT VISIT HI MDM: CPT | Performed by: EMERGENCY MEDICINE

## 2025-01-10 RX ORDER — MAGNESIUM SULFATE HEPTAHYDRATE 40 MG/ML
2 INJECTION, SOLUTION INTRAVENOUS ONCE
Status: COMPLETED | OUTPATIENT
Start: 2025-01-10 | End: 2025-01-10

## 2025-01-10 RX ORDER — METOCLOPRAMIDE HYDROCHLORIDE 5 MG/ML
10 INJECTION INTRAMUSCULAR; INTRAVENOUS ONCE
Status: COMPLETED | OUTPATIENT
Start: 2025-01-10 | End: 2025-01-10

## 2025-01-10 RX ORDER — MAGNESIUM SULFATE HEPTAHYDRATE 40 MG/ML
2 INJECTION, SOLUTION INTRAVENOUS ONCE
Status: DISCONTINUED | OUTPATIENT
Start: 2025-01-10 | End: 2025-01-10

## 2025-01-10 RX ORDER — DIPHENHYDRAMINE HYDROCHLORIDE 50 MG/ML
25 INJECTION INTRAMUSCULAR; INTRAVENOUS ONCE
Status: COMPLETED | OUTPATIENT
Start: 2025-01-10 | End: 2025-01-10

## 2025-01-10 RX ORDER — KETOROLAC TROMETHAMINE 30 MG/ML
15 INJECTION, SOLUTION INTRAMUSCULAR; INTRAVENOUS ONCE
Status: COMPLETED | OUTPATIENT
Start: 2025-01-10 | End: 2025-01-10

## 2025-01-10 RX ADMIN — MAGNESIUM SULFATE HEPTAHYDRATE 2 G: 40 INJECTION, SOLUTION INTRAVENOUS at 00:57

## 2025-01-10 RX ADMIN — METOCLOPRAMIDE 10 MG: 5 INJECTION, SOLUTION INTRAMUSCULAR; INTRAVENOUS at 00:32

## 2025-01-10 RX ADMIN — DIPHENHYDRAMINE HYDROCHLORIDE 25 MG: 50 INJECTION, SOLUTION INTRAMUSCULAR; INTRAVENOUS at 00:29

## 2025-01-10 RX ADMIN — KETOROLAC TROMETHAMINE 15 MG: 30 INJECTION, SOLUTION INTRAMUSCULAR; INTRAVENOUS at 00:27

## 2025-01-10 NOTE — ED PROVIDER NOTES
Time reflects when diagnosis was documented in both MDM as applicable and the Disposition within this note       Time User Action Codes Description Comment    1/10/2025  1:52 AM Kirt Sampson Add [R51.9] Headache           ED Disposition       ED Disposition   Discharge    Condition   Stable    Date/Time   Fri Mike 10, 2025  1:52 AM    Comment   Roseanne Joel discharge to home/self care.                   Assessment & Plan       Medical Decision Making  39-year-old female with headache, review of records suggest infundibular change versus perhaps small aneurysm we will repeat imaging here and reassess.    Amount and/or Complexity of Data Reviewed  Labs: ordered.  Radiology: ordered.    Risk  Prescription drug management.             Medications   iohexol (OMNIPAQUE) 350 MG/ML injection (MULTI-DOSE) 100 mL (100 mL Intravenous Given 1/9/25 2219)   ketorolac (TORADOL) injection 15 mg (15 mg Intravenous Given 1/10/25 0027)   metoclopramide (REGLAN) injection 10 mg (10 mg Intravenous Given 1/10/25 0032)   diphenhydrAMINE (BENADRYL) injection 25 mg (25 mg Intravenous Given 1/10/25 0029)   magnesium sulfate 2 g/50 mL IVPB (premix) 2 g (0 g Intravenous Stopped 1/10/25 0158)       ED Risk Strat Scores                          SBIRT 22yo+      Flowsheet Row Most Recent Value   Initial Alcohol Screen: US AUDIT-C     1. How often do you have a drink containing alcohol? 0 Filed at: 01/09/2025 1933   2. How many drinks containing alcohol do you have on a typical day you are drinking?  0 Filed at: 01/09/2025 1933   3a. Male UNDER 65: How often do you have five or more drinks on one occasion? 0 Filed at: 01/09/2025 1933   3b. FEMALE Any Age, or MALE 65+: How often do you have 4 or more drinks on one occassion? 0 Filed at: 01/09/2025 1933   Audit-C Score 0 Filed at: 01/09/2025 1933   DREA: How many times in the past year have you...    Used an illegal drug or used a prescription medication for non-medical reasons? Never Filed at:  01/09/2025 1933                            History of Present Illness       Chief Complaint   Patient presents with    Headache     Headache since Monday, recently dx with aneurysm       Past Medical History:   Diagnosis Date    Anxiety     Arthritis     Asthma     CTS (carpal tunnel syndrome) 2 años    Fibromyalgia     Fibromyalgia, primary 5 años    Headache, tension-type     Migraine     Panic attack     Rheumatoid arthritis (HCC)       Past Surgical History:   Procedure Laterality Date    CARPAL TUNNEL RELEASE Right     TUBAL LIGATION      TUBAL LIGATION        Family History   Problem Relation Age of Onset    Migraines Sister       Social History     Tobacco Use    Smoking status: Never    Smokeless tobacco: Never   Vaping Use    Vaping status: Never Used   Substance Use Topics    Alcohol use: Yes     Alcohol/week: 1.0 standard drink of alcohol     Types: 1 Glasses of wine per week     Comment: occ    Drug use: No      E-Cigarette/Vaping    E-Cigarette Use Never User       E-Cigarette/Vaping Substances      I have reviewed and agree with the history as documented.     39-year-old female presenting to the emergency department for evaluation of headache.  Patient describes throbbing right-sided headache associated with some dizziness and nausea.  No fevers chills neck pain congestion or myalgias.  She is concerned because she understands she had a recent head CT that suggest that she had an aneurysm.        Review of Systems   Constitutional:  Negative for appetite change, chills, fatigue and fever.   HENT:  Negative for sneezing and sore throat.    Eyes:  Negative for visual disturbance.   Respiratory:  Negative for cough, choking, chest tightness, shortness of breath and wheezing.    Cardiovascular:  Negative for chest pain and palpitations.   Gastrointestinal:  Negative for abdominal pain, constipation, diarrhea, nausea and vomiting.   Genitourinary:  Negative for difficulty urinating and dysuria.    Neurological:  Positive for headaches. Negative for dizziness, weakness, light-headedness and numbness.   All other systems reviewed and are negative.          Objective       ED Triage Vitals   Temperature Pulse Blood Pressure Respirations SpO2 Patient Position - Orthostatic VS   01/09/25 1931 01/09/25 1931 01/09/25 1931 01/09/25 1931 01/09/25 1931 01/09/25 1931   98.2 °F (36.8 °C) 77 115/68 20 99 % Sitting      Temp Source Heart Rate Source BP Location FiO2 (%) Pain Score    01/09/25 1931 01/09/25 1931 01/09/25 1931 -- 01/09/25 1952    Oral Monitor Left arm  8      Vitals      Date and Time Temp Pulse SpO2 Resp BP Pain Score FACES Pain Rating User   01/10/25 0027 -- -- -- -- -- 7 -- FMS   01/09/25 2339 -- 70 100 % 20 112/73 -- -- JM   01/09/25 2230 -- 71 100 % 20 124/70 -- -- BH   01/09/25 2200 -- 70 100 % 20 121/79 -- -- BH   01/09/25 1952 -- -- -- -- -- 8 -- BS   01/09/25 1931 98.2 °F (36.8 °C) 77 99 % 20 115/68 -- -- KW            Physical Exam  Vitals and nursing note reviewed.   Constitutional:       General: She is not in acute distress.     Appearance: She is well-developed. She is not diaphoretic.   HENT:      Head: Normocephalic and atraumatic.   Eyes:      Pupils: Pupils are equal, round, and reactive to light.   Neck:      Vascular: No JVD.      Trachea: No tracheal deviation.   Cardiovascular:      Rate and Rhythm: Normal rate and regular rhythm.      Heart sounds: Normal heart sounds. No murmur heard.     No friction rub. No gallop.   Pulmonary:      Effort: Pulmonary effort is normal. No respiratory distress.      Breath sounds: Normal breath sounds. No wheezing or rales.   Abdominal:      General: Bowel sounds are normal. There is no distension.      Palpations: Abdomen is soft.      Tenderness: There is no abdominal tenderness. There is no guarding or rebound.   Skin:     General: Skin is warm and dry.      Coloration: Skin is not pale.   Neurological:      Mental Status: She is alert and  oriented to person, place, and time.      Cranial Nerves: No cranial nerve deficit.      Motor: No abnormal muscle tone.   Psychiatric:         Behavior: Behavior normal.         Results Reviewed       Procedure Component Value Units Date/Time    Urine Microscopic [[id number]]  (Abnormal) Collected: 01/10/25 0024    Lab Status: Final result Specimen: Urine, Clean Catch Updated: 01/10/25 0138     RBC, UA 4-10 /hpf      WBC, UA 2-4 /hpf      Epithelial Cells Occasional /hpf      Bacteria, UA Occasional /hpf      MUCUS THREADS Occasional    UA w Reflex to Microscopic w Reflex to Culture [[id number]]  (Abnormal) Collected: 01/10/25 0024    Lab Status: Final result Specimen: Urine, Clean Catch Updated: 01/10/25 0039     Color, UA Light Yellow     Clarity, UA Clear     Specific Gravity, UA >=1.050     pH, UA 6.0     Leukocytes, UA Negative     Nitrite, UA Negative     Protein, UA Trace mg/dl      Glucose, UA Negative mg/dl      Ketones, UA Negative mg/dl      Urobilinogen, UA <2.0 mg/dl      Bilirubin, UA Negative     Occult Blood, UA Negative    POCT pregnancy, urine [[id number]]  (Normal) Collected: 01/10/25 0025    Lab Status: Final result Updated: 01/10/25 0026     EXT Preg Test, Ur Negative     Control Valid    Comprehensive metabolic panel [[id number]]  (Abnormal) Collected: 01/09/25 2108    Lab Status: Final result Specimen: Blood from Arm, Right Updated: 01/09/25 2128     Sodium 138 mmol/L      Potassium 3.5 mmol/L      Chloride 105 mmol/L      CO2 26 mmol/L      ANION GAP 7 mmol/L      BUN 19 mg/dL      Creatinine 0.65 mg/dL      Glucose 102 mg/dL      Calcium 9.2 mg/dL      AST 11 U/L      ALT 7 U/L      Alkaline Phosphatase 62 U/L      Total Protein 7.0 g/dL      Albumin 4.3 g/dL      Total Bilirubin 0.39 mg/dL      eGFR 110 ml/min/1.73sq m     Narrative:      [employer] guidelines for Chronic Kidney Disease (CKD):     Stage 1 with normal or high GFR (GFR > 90 mL/min/1.73 square meters)     Stage 2 Mild CKD (GFR = 60-89 mL/min/1.73 square meters)    Stage 3A Moderate CKD (GFR = 45-59 mL/min/1.73 square meters)    Stage 3B Moderate CKD (GFR = 30-44 mL/min/1.73 square meters)    Stage 4 Severe CKD (GFR = 15-29 mL/min/1.73 square meters)    Stage 5 End Stage CKD (GFR <15 mL/min/1.73 square meters)  Note: GFR calculation is accurate only with a steady state creatinine    CBC and differential [[id number]] Collected: 01/09/25 2108    Lab Status: Final result Specimen: Blood from Arm, Right Updated: 01/09/25 2113     WBC 7.77 Thousand/uL      RBC 4.88 Million/uL      Hemoglobin 14.0 g/dL      Hematocrit 42.0 %      MCV 86 fL      MCH 28.7 pg      MCHC 33.3 g/dL      RDW 14.2 %      MPV 10.8 fL      Platelets 293 Thousands/uL      nRBC 0 /100 WBCs      Segmented % 67 %      Immature Grans % 0 %      Lymphocytes % 21 %      Monocytes % 8 %      Eosinophils Relative 3 %      Basophils Relative 1 %      Absolute Neutrophils 5.30 Thousands/µL      Absolute Immature Grans 0.01 Thousand/uL      Absolute Lymphocytes 1.61 Thousands/µL      Absolute Monocytes 0.61 Thousand/µL      Eosinophils Absolute 0.20 Thousand/µL      Basophils Absolute 0.04 Thousands/µL             CTA head and neck with and without contrast   Final Interpretation by Dora Alonzo MD (01/09 8687)      1.  No acute intracranial abnormality.   2.  Previously described right P-comm infundibulum versus aneurysm is not well visualized on this study.   3.  No stenosis or occlusion of the carotid or vertebral arteries or major vessels of the [place].                           Workstation performed: [id number]             Procedures    ED Medication and Procedure Management   Prior to Admission Medications   Prescriptions Last Dose Informant Patient Reported? Taking?   ALPRAZolam (XANAX) 1 mg tablet   Yes No   Sig: TAKE ONE (1) TABLET BY MOUTH ONCE A DAY, AS NEEDED PRIOR TO AIRPLANE TRIP   EPINEPHrine (EPIPEN) 0.3 mg/0.3 mL SOAJ   No No    Sig: Inject 0.3 mL into the shoulder, thigh, or buttocks once for 1 dose If needed for allergic reaction.  Proceed immediately to ED if you use the EpiPen.   Patient not taking: Reported on 10/9/2024   FLUoxetine (PROzac) 40 MG capsule   Yes No   Sig: Take 1 capsule by mouth daily   HYDROcodone-acetaminophen (NORCO) 5-325 mg per tablet  Self No No   Sig: Take 1 tablet by mouth every 6 (six) hours as needed (Not relieved by Anti-inflammatory) for up to 12 dosesMax Daily Amount: 4 tablets   Patient not taking: Reported on 10/9/2024   LORazepam (ATIVAN) 1 mg tablet   No No   Sig: Take 1 tablet (1 mg total) by mouth 3 (three) times a day as needed for anxiety for up to 5 days   Magnesium Hydroxide (MAGNESIA PO)  Self Yes No   Sig: Take by mouth   Melatonin 10 MG CAPS  Self Yes No   SUMAtriptan (Imitrex) 50 mg tablet   No No   Sig: Take 1 tablet (50 mg total) by mouth if needed for migraine (Take one tab at the start of the headache, if no relief can repeat the dose in 2 hours but not more than 3 doses in 24 hours.) for up to 1 dose   acetaminophen (TYLENOL) 650 mg CR tablet   Yes No   Si mg every 8 (eight) hours as needed   albuterol (PROVENTIL HFA,VENTOLIN HFA) 90 mcg/act inhaler   Yes No   Sig: Inhale 2 puffs every 6 (six) hours as needed   amitriptyline (ELAVIL) 10 mg tablet   No No   Sig: Start with 10 mg at bed time, if no relief and able to tolerate please increase the dose to 20 mg at bed time.   baclofen 10 mg tablet  Self No No   Sig: Take 1 tablet (10 mg total) by mouth 3 (three) times a day as needed for muscle spasms   betamethasone dipropionate (DIPROSONE) 0.05 % cream   Yes No   Sig: Apply 1 application. topically 2 (two) times a day   busPIRone (BUSPAR) 10 mg tablet   Yes No   Sig: Take 1 tablet by mouth 2 (two) times a day   desoximetasone (TOPICORT) 0.05 % cream   Yes No   Sig: APPLY TO AFFECTED AREA(S) OF RASH/IRRITATION TWICE DAILY AS DIRECTED   diazepam (VALIUM) 5 mg tablet  Self No No   Sig:  Take 1 tablet (5 mg total) by mouth every 8 (eight) hours as needed for anxiety   diclofenac (VOLTAREN) 75 mg EC tablet  Self No No   Sig: Take 1 tablet (75 mg total) by mouth 2 (two) times a day   diclofenac sodium (VOLTAREN) 1 %  Self No No   Sig: Apply 2 g topically 4 (four) times a day as needed (pain)   Patient not taking: Reported on 10/2/2020   dicyclomine (BENTYL) 20 mg tablet   No No   Sig: Take 1 tablet (20 mg total) by mouth 2 (two) times a day for 3 days   Patient not taking: Reported on 10/9/2024   doxepin (SINEquan) 25 mg capsule   Yes No   Sig: TAKE ONE (1) TO TWO (2) CAPSULES BY MOUTH AT BEDTIME, AS NEEDED FOR SLEEP   etanercept (ENBREL) 50 mg/mL SOSY  Self Yes No   Sig: Inject under the skin once a week   Patient not taking: Reported on 6/12/2023   folic acid (FOLVITE) 1 mg tablet   Yes No   Sig: Take 1 tablet by mouth daily   hydrOXYzine HCL (ATARAX) 25 mg tablet  Self Yes No   Sig: Take 25 mg by mouth 3 (three) times a day   ketorolac (TORADOL) 10 mg tablet  Self No No   Sig: Take 1 tablet (10 mg total) by mouth 2 (two) times a day as needed for moderate pain   magnesium oxide (MAG-OX) 400 mg   No No   Sig: Take 1 tablet (400 mg total) by mouth 2 (two) times a day for 14 days   meclizine (ANTIVERT) 25 mg tablet  Self No No   Sig: Take 1 tablet (25 mg total) by mouth 3 (three) times a day as needed for dizziness   methocarbamol (ROBAXIN) 500 mg tablet  Self No No   Sig: Take 1 tablet (500 mg total) by mouth 3 (three) times a day as needed for muscle spasms   methotrexate 2.5 MG tablet   Yes No   methylPREDNISolone 4 MG tablet therapy pack   Yes No   Sig: TAKE 6 TABLETS ON DAY 1 AS DIRECTED ON PACKAGE AND DECREASE BY 1 TAB EACH DAY FOR A TOTAL OF 6 DAYS   ondansetron (ZOFRAN-ODT) 4 mg disintegrating tablet  Self No No   Sig: Take 1 tablet (4 mg total) by mouth every 6 (six) hours as needed for nausea or vomiting   oxyCODONE-acetaminophen (PERCOCET) 5-325 mg per tablet  Self No No   Sig: Take 1  tablet by mouth every 8 (eight) hours as needed for moderate painMax Daily Amount: 3 tablets   Patient not taking: Reported on 6/12/2023   oxybutynin (DITROPAN-XL) 10 MG 24 hr tablet   Yes No   Sig: Take 10 mg by mouth daily   pantoprazole (PROTONIX) 40 mg tablet   Yes No   Sig: Take 40 mg by mouth daily   predniSONE 20 mg tablet  Self No No   Sig: Take 2 tablets (40 mg total) by mouth daily   tocilizumab (Actemra) 80 mg/4 mL   Yes No   Sig: Inject into a catheter in a vein      Facility-Administered Medications: None     Discharge Medication List as of 1/10/2025  1:52 AM        CONTINUE these medications which have NOT CHANGED    Details   acetaminophen (TYLENOL) 650 mg CR tablet 650 mg every 8 (eight) hours as needed, Historical Med      albuterol (PROVENTIL HFA,VENTOLIN HFA) 90 mcg/act inhaler Inhale 2 puffs every 6 (six) hours as needed, Historical Med      ALPRAZolam (XANAX) 1 mg tablet TAKE ONE (1) TABLET BY MOUTH ONCE A DAY, AS NEEDED PRIOR TO AIRPLANE TRIP, Historical Med      amitriptyline (ELAVIL) 10 mg tablet Start with 10 mg at bed time, if no relief and able to tolerate please increase the dose to 20 mg at bed time., Normal      baclofen 10 mg tablet Take 1 tablet (10 mg total) by mouth 3 (three) times a day as needed for muscle spasms, Starting Tue 5/16/2023, Normal      betamethasone dipropionate (DIPROSONE) 0.05 % cream Apply 1 application. topically 2 (two) times a day, Starting Mon 12/11/2023, Until Tue 12/10/2024, Historical Med      busPIRone (BUSPAR) 10 mg tablet Take 1 tablet by mouth 2 (two) times a day, Historical Med      desoximetasone (TOPICORT) 0.05 % cream APPLY TO AFFECTED AREA(S) OF RASH/IRRITATION TWICE DAILY AS DIRECTED, Historical Med      diazepam (VALIUM) 5 mg tablet Take 1 tablet (5 mg total) by mouth every 8 (eight) hours as needed for anxiety, Starting Fri 5/12/2023, Print      diclofenac (VOLTAREN) 75 mg EC tablet Take 1 tablet (75 mg total) by mouth 2 (two) times a day,  Starting Wed 6/14/2023, Normal      diclofenac sodium (VOLTAREN) 1 % Apply 2 g topically 4 (four) times a day as needed (pain), Starting Tue 12/3/2019, Print      dicyclomine (BENTYL) 20 mg tablet Take 1 tablet (20 mg total) by mouth 2 (two) times a day for 3 days, Starting Sat 5/11/2019, Until Sat 11/16/2019, Print      doxepin (SINEquan) 25 mg capsule TAKE ONE (1) TO TWO (2) CAPSULES BY MOUTH AT BEDTIME, AS NEEDED FOR SLEEP, Historical Med      EPINEPHrine (EPIPEN) 0.3 mg/0.3 mL SOAJ Inject 0.3 mL into the shoulder, thigh, or buttocks once for 1 dose If needed for allergic reaction.  Proceed immediately to ED if you use the EpiPen., Starting Mon 8/21/2017, Print      etanercept (ENBREL) 50 mg/mL SOSY Inject under the skin once a week, Historical Med      FLUoxetine (PROzac) 40 MG capsule Take 1 capsule by mouth daily, Historical Med      folic acid (FOLVITE) 1 mg tablet Take 1 tablet by mouth daily, Historical Med      HYDROcodone-acetaminophen (NORCO) 5-325 mg per tablet Take 1 tablet by mouth every 6 (six) hours as needed (Not relieved by Anti-inflammatory) for up to 12 dosesMax Daily Amount: 4 tablets, Starting Fri 9/6/2019, Print      hydrOXYzine HCL (ATARAX) 25 mg tablet Take 25 mg by mouth 3 (three) times a day, Historical Med      ketorolac (TORADOL) 10 mg tablet Take 1 tablet (10 mg total) by mouth 2 (two) times a day as needed for moderate pain, Starting Tue 5/16/2023, Normal      LORazepam (ATIVAN) 1 mg tablet Take 1 tablet (1 mg total) by mouth 3 (three) times a day as needed for anxiety for up to 5 days, Starting Sat 12/8/2018, Until Wed 10/9/2024 at 2359, Print      Magnesium Hydroxide (MAGNESIA PO) Take by mouth, Historical Med      magnesium oxide (MAG-OX) 400 mg Take 1 tablet (400 mg total) by mouth 2 (two) times a day for 14 days, Starting Fri 10/2/2020, Until Fri 10/16/2020, Normal      meclizine (ANTIVERT) 25 mg tablet Take 1 tablet (25 mg total) by mouth 3 (three) times a day as needed for  dizziness, Starting Sat 9/21/2024, Normal      Melatonin 10 MG CAPS Historical Med      methocarbamol (ROBAXIN) 500 mg tablet Take 1 tablet (500 mg total) by mouth 3 (three) times a day as needed for muscle spasms, Starting Wed 7/14/2021, Normal      methotrexate 2.5 MG tablet Historical Med      methylPREDNISolone 4 MG tablet therapy pack TAKE 6 TABLETS ON DAY 1 AS DIRECTED ON PACKAGE AND DECREASE BY 1 TAB EACH DAY FOR A TOTAL OF 6 DAYS, Historical Med      ondansetron (ZOFRAN-ODT) 4 mg disintegrating tablet Take 1 tablet (4 mg total) by mouth every 6 (six) hours as needed for nausea or vomiting, Starting Mon 4/22/2024, Normal      oxybutynin (DITROPAN-XL) 10 MG 24 hr tablet Take 10 mg by mouth daily, Starting Mon 10/21/2024, Until Tue 10/21/2025, Historical Med      oxyCODONE-acetaminophen (PERCOCET) 5-325 mg per tablet Take 1 tablet by mouth every 8 (eight) hours as needed for moderate painMax Daily Amount: 3 tablets, Starting Mon 2/24/2020, Normal      pantoprazole (PROTONIX) 40 mg tablet Take 40 mg by mouth daily, Historical Med      predniSONE 20 mg tablet Take 2 tablets (40 mg total) by mouth daily, Starting Mon 7/13/2020, Print      SUMAtriptan (Imitrex) 50 mg tablet Take 1 tablet (50 mg total) by mouth if needed for migraine (Take one tab at the start of the headache, if no relief can repeat the dose in 2 hours but not more than 3 doses in 24 hours.) for up to 1 dose, Starting Wed 10/9/2024, Normal      tocilizumab (Actemra) 80 mg/4 mL Inject into a catheter in a vein, Historical Med           No discharge procedures on file.  ED SEPSIS DOCUMENTATION   Time reflects when diagnosis was documented in both MDM as applicable and the Disposition within this note       Time User Action Codes Description Comment    1/10/2025  1:52 AM Kirt Sampson Add [R51.9] Headache                  Kirt Sampson MD  01/10/25 8922

## 2025-01-14 ENCOUNTER — HOSPITAL ENCOUNTER (EMERGENCY)
Facility: HOSPITAL | Age: 40
Discharge: HOME | End: 2025-01-14
Attending: EMERGENCY MEDICINE
Payer: COMMERCIAL

## 2025-01-14 VITALS
HEART RATE: 70 BPM | RESPIRATION RATE: 20 BRPM | TEMPERATURE: 97.6 F | DIASTOLIC BLOOD PRESSURE: 81 MMHG | OXYGEN SATURATION: 99 % | SYSTOLIC BLOOD PRESSURE: 127 MMHG

## 2025-01-14 DIAGNOSIS — R51.9 HEADACHE: Primary | ICD-10-CM

## 2025-01-14 LAB
CRP SERPL QL: 4.2 MG/L
ERYTHROCYTE [SEDIMENTATION RATE] IN BLOOD: 11 MM/HOUR (ref 0–19)

## 2025-01-14 PROCEDURE — 96361 HYDRATE IV INFUSION ADD-ON: CPT

## 2025-01-14 PROCEDURE — 96375 TX/PRO/DX INJ NEW DRUG ADDON: CPT

## 2025-01-14 PROCEDURE — 99283 EMERGENCY DEPT VISIT LOW MDM: CPT

## 2025-01-14 PROCEDURE — 85652 RBC SED RATE AUTOMATED: CPT | Performed by: EMERGENCY MEDICINE

## 2025-01-14 PROCEDURE — 96374 THER/PROPH/DIAG INJ IV PUSH: CPT

## 2025-01-14 PROCEDURE — 99285 EMERGENCY DEPT VISIT HI MDM: CPT | Performed by: EMERGENCY MEDICINE

## 2025-01-14 PROCEDURE — 36415 COLL VENOUS BLD VENIPUNCTURE: CPT | Performed by: EMERGENCY MEDICINE

## 2025-01-14 PROCEDURE — 86140 C-REACTIVE PROTEIN: CPT | Performed by: EMERGENCY MEDICINE

## 2025-01-14 RX ORDER — DEXAMETHASONE SODIUM PHOSPHATE 10 MG/ML
8 INJECTION, SOLUTION INTRAMUSCULAR; INTRAVENOUS ONCE
Status: COMPLETED | OUTPATIENT
Start: 2025-01-14 | End: 2025-01-14

## 2025-01-14 RX ORDER — KETOROLAC TROMETHAMINE 30 MG/ML
15 INJECTION, SOLUTION INTRAMUSCULAR; INTRAVENOUS ONCE
Status: COMPLETED | OUTPATIENT
Start: 2025-01-14 | End: 2025-01-14

## 2025-01-14 RX ORDER — METOCLOPRAMIDE HYDROCHLORIDE 5 MG/ML
5 INJECTION INTRAMUSCULAR; INTRAVENOUS ONCE
Status: COMPLETED | OUTPATIENT
Start: 2025-01-14 | End: 2025-01-14

## 2025-01-14 RX ORDER — DIPHENHYDRAMINE HYDROCHLORIDE 50 MG/ML
25 INJECTION INTRAMUSCULAR; INTRAVENOUS ONCE
Status: COMPLETED | OUTPATIENT
Start: 2025-01-14 | End: 2025-01-14

## 2025-01-14 RX ADMIN — MORPHINE SULFATE 2 MG: 2 INJECTION, SOLUTION INTRAMUSCULAR; INTRAVENOUS at 23:17

## 2025-01-14 RX ADMIN — SODIUM CHLORIDE 1000 ML: 0.9 INJECTION, SOLUTION INTRAVENOUS at 22:26

## 2025-01-14 RX ADMIN — DEXAMETHASONE SODIUM PHOSPHATE 8 MG: 10 INJECTION INTRAMUSCULAR; INTRAVENOUS at 22:26

## 2025-01-14 RX ADMIN — METOCLOPRAMIDE 5 MG: 5 INJECTION, SOLUTION INTRAMUSCULAR; INTRAVENOUS at 22:26

## 2025-01-14 RX ADMIN — DIPHENHYDRAMINE HYDROCHLORIDE 25 MG: 50 INJECTION, SOLUTION INTRAMUSCULAR; INTRAVENOUS at 22:26

## 2025-01-14 RX ADMIN — KETOROLAC TROMETHAMINE 15 MG: 30 INJECTION, SOLUTION INTRAMUSCULAR; INTRAVENOUS at 23:17

## 2025-01-15 NOTE — ED PROVIDER NOTES
Time reflects when diagnosis was documented in both MDM as applicable and the Disposition within this note       Time User Action Codes Description Comment    1/14/2025 11:09 PM Denis Amado Add [R51.9] Headache           ED Disposition       ED Disposition   Discharge    Condition   Stable    Date/Time   Tue Jan 14, 2025 11:09 PM    Comment   Roseanne Maldonado discharge to home/self care.                   Assessment & Plan       Medical Decision Making  Roseanne Maldonado presents to the ER with CC of HA. Ddx includes but not limited to: Tension headache, migraine, symptomatic hypertension, tumor, bleeding, edema, other.    Patient clinical presentation is benign.    Meaning patient's vital signs are normal and stable ED Triage Vitals  Temperature: 97.6 °F (36.4 °C) [01/14/25 2144]  Pulse: 76 [01/14/25 2142]  Respirations: 19 [01/14/25 2142]  Blood Pressure: 128/75 [01/14/25 2142]  SpO2: 99 % [01/14/25 2142]  Temp Source: Temporal [01/14/25 2144]  Heart Rate Source: Monitor [01/14/25 2230]  Patient Position - Orthostatic VS: Lying [01/14/25 2230]  BP Location: Right arm [01/14/25 2230]  FiO2 (%): n/a  Pain Score: 7 [01/14/25 2230].    Patient in no distress.    Chief complaint, vital signs, physical examination does not suggest an acute medical emergency at this time.         Problems Addressed:  Headache: chronic illness or injury with exacerbation, progression, or side effects of treatment    Amount and/or Complexity of Data Reviewed  Labs: ordered. Decision-making details documented in ED Course.  Discussion of management or test interpretation with external provider(s): Patient was treated for headache and did improve during her emergency room stay.    Risk  Prescription drug management.  Parenteral controlled substances.        ED Course as of 01/14/25 2341   Tue Jan 14, 2025 2152 Has appointment for MRI brain in Feb 2025 2258 Sed Rate: 11       Medications   dexamethasone (PF) (DECADRON) injection 8 mg (8  mg Intravenous Given 1/14/25 2226)   metoclopramide (REGLAN) injection 5 mg (5 mg Intravenous Given 1/14/25 2226)   diphenhydrAMINE (BENADRYL) injection 25 mg (25 mg Intravenous Given 1/14/25 2226)   sodium chloride 0.9 % bolus 1,000 mL (0 mL Intravenous Stopped 1/14/25 2318)   ketorolac (TORADOL) injection 15 mg (15 mg Intravenous Given 1/14/25 2317)   morphine injection 2 mg (2 mg Intravenous Given 1/14/25 2317)       ED Risk Strat Scores                          SBIRT 20yo+      Flowsheet Row Most Recent Value   Initial Alcohol Screen: US AUDIT-C     1. How often do you have a drink containing alcohol? 0 Filed at: 01/14/2025 2227   2. How many drinks containing alcohol do you have on a typical day you are drinking?  0 Filed at: 01/14/2025 2227   3b. FEMALE Any Age, or MALE 65+: How often do you have 4 or more drinks on one occassion? 0 Filed at: 01/14/2025 2227   Audit-C Score 0 Filed at: 01/14/2025 2227   DREA: How many times in the past year have you...    Used an illegal drug or used a prescription medication for non-medical reasons? Never Filed at: 01/14/2025 2227                            History of Present Illness       Chief Complaint   Patient presents with    Hypertension     Pt c/o pressure behind eyes and thinks she has HTN, no hx of Htn. Past few days pt was getting infusion for arthritis and when VS was taken pt was hypertensive. At home pt felt same way as yesterday at the infusion appoinment. Pt reports 8/10 for headache. Was here recently for headache       Past Medical History:   Diagnosis Date    Anxiety     Arthritis     Asthma     CTS (carpal tunnel syndrome) 2 años    Fibromyalgia     Fibromyalgia, primary 5 años    Headache, tension-type     Migraine     Panic attack     Rheumatoid arthritis (HCC)       Past Surgical History:   Procedure Laterality Date    CARPAL TUNNEL RELEASE Right     TUBAL LIGATION      TUBAL LIGATION        Family History   Problem Relation Age of Onset    Migraines  Sister       Social History     Tobacco Use    Smoking status: Never    Smokeless tobacco: Never   Vaping Use    Vaping status: Never Used   Substance Use Topics    Alcohol use: Yes     Alcohol/week: 1.0 standard drink of alcohol     Types: 1 Glasses of wine per week     Comment: occ    Drug use: No      E-Cigarette/Vaping    E-Cigarette Use Never User       E-Cigarette/Vaping Substances      I have reviewed and agree with the history as documented.     Roseanne Maldonado is a 39 y.o.  year old female  Past Medical History:  No date: Anxiety  No date: Arthritis  No date: Asthma  2 años: CTS (carpal tunnel syndrome)  No date: Fibromyalgia  5 años: Fibromyalgia, primary  No date: Headache, tension-type  No date: Migraine  No date: Panic attack  No date: Rheumatoid arthritis (HCC)  Social History    Tobacco Use      Smoking status: Never      Smokeless tobacco: Never    Vaping Use      Vaping status: Never Used    Alcohol use: Yes      Alcohol/week: 1.0 standard drink of alcohol      Types: 1 Glasses of wine per week      Comment: occ    Drug use: No    Patient presents with:  Hypertension: Pt c/o pressure behind eyes and thinks she has HTN, no hx of Htn. Past few days pt was getting infusion for arthritis and when VS was taken pt was hypertensive. At home pt felt same way as yesterday at the infusion appoinment. Pt reports 8/10 for headache. Was here recently for headache      Patient just recently had a CT angiogram of her head that was nonacute.  And less than 6 months ago had MRI of the head that was also nonacute.  She has a follow-up with neurology and a follow-up MRI in the months to come.  She has underlying history of endometriosis, fibromyalgia                  History provided by:  Patient   used: No    Hypertension  Associated symptoms: headaches    Associated symptoms: no abdominal pain, no chest pain, no ear pain, no fever, no hematuria, no palpitations, no shortness of breath and not  vomiting        Review of Systems   Constitutional:  Negative for chills and fever.   HENT:  Negative for ear pain and sore throat.    Eyes:  Negative for pain and visual disturbance.   Respiratory:  Negative for cough and shortness of breath.    Cardiovascular:  Negative for chest pain and palpitations.   Gastrointestinal:  Negative for abdominal pain and vomiting.   Genitourinary:  Negative for dysuria and hematuria.   Musculoskeletal:  Negative for arthralgias and back pain.   Skin:  Negative for color change and rash.   Neurological:  Positive for headaches. Negative for seizures and syncope.   All other systems reviewed and are negative.          Objective       ED Triage Vitals   Temperature Pulse Blood Pressure Respirations SpO2 Patient Position - Orthostatic VS   01/14/25 2144 01/14/25 2142 01/14/25 2142 01/14/25 2142 01/14/25 2142 01/14/25 2230   97.6 °F (36.4 °C) 76 128/75 19 99 % Lying      Temp Source Heart Rate Source BP Location FiO2 (%) Pain Score    01/14/25 2144 01/14/25 2230 01/14/25 2230 -- 01/14/25 2230    Temporal Monitor Right arm  7      Vitals      Date and Time Temp Pulse SpO2 Resp BP Pain Score FACES Pain Rating User   01/14/25 2317 -- -- -- -- -- 6 --    01/14/25 2300 -- 70 99 % 20 127/81 -- --    01/14/25 2230 -- 72 99 % 20 132/84 7 --    01/14/25 2144 97.6 °F (36.4 °C) -- -- -- -- -- -- [facility]   01/14/25 2142 -- 76 99 % 19 128/75 -- -- NISHANT            Physical Exam  Vitals and nursing note reviewed.   Constitutional:       General: She is not in acute distress.     Appearance: Normal appearance. She is well-developed and normal weight.   HENT:      Head: Normocephalic and atraumatic.      Right Ear: External ear normal.      Left Ear: External ear normal.      Nose: Nose normal.      Mouth/Throat:      Mouth: Mucous membranes are moist.   Eyes:      Extraocular Movements: Extraocular movements intact.      Conjunctiva/sclera: Conjunctivae normal.      Pupils: Pupils are equal, round,  and reactive to light.   Cardiovascular:      Rate and Rhythm: Normal rate and regular rhythm.      Heart sounds: No murmur heard.  Pulmonary:      Effort: Pulmonary effort is normal. No respiratory distress.      Breath sounds: Normal breath sounds.   Abdominal:      Palpations: Abdomen is soft.      Tenderness: There is no abdominal tenderness.   Musculoskeletal:         General: No swelling.      Cervical back: Neck supple.   Skin:     General: Skin is warm and dry.      Capillary Refill: Capillary refill takes less than 2 seconds.   Neurological:      General: No focal deficit present.      Mental Status: She is alert and oriented to person, place, and time.      Cranial Nerves: No cranial nerve deficit.      Motor: No weakness.   Psychiatric:         Mood and Affect: Mood normal.         Thought Content: Thought content normal.         Results Reviewed       Procedure Component Value Units Date/Time    C-reactive protein [[id number]]  (Abnormal) Collected: 01/14/25 2224    Lab Status: Final result Specimen: Blood from Arm, Left Updated: 01/14/25 2241     CRP 4.2 mg/L     Sedimentation rate, automated [[id number]]  (Normal) Collected: 01/14/25 2224    Lab Status: Final result Specimen: Blood from Arm, Left Updated: 01/14/25 2230     Sed Rate 11 mm/hour             No orders to display       Procedures    ED Medication and Procedure Management   Prior to Admission Medications   Prescriptions Last Dose Informant Patient Reported? Taking?   ALPRAZolam (XANAX) 1 mg tablet   Yes No   Sig: TAKE ONE (1) TABLET BY MOUTH ONCE A DAY, AS NEEDED PRIOR TO AIRPLANE TRIP   EPINEPHrine (EPIPEN) 0.3 mg/0.3 mL SOAJ   No No   Sig: Inject 0.3 mL into the shoulder, thigh, or buttocks once for 1 dose If needed for allergic reaction.  Proceed immediately to ED if you use the EpiPen.   Patient not taking: Reported on 10/9/2024   FLUoxetine (PROzac) 40 MG capsule   Yes No   Sig: Take 1 capsule by mouth daily   HYDROcodone-acetaminophen  (NORCO) 5-325 mg per tablet  Self No No   Sig: Take 1 tablet by mouth every 6 (six) hours as needed (Not relieved by Anti-inflammatory) for up to 12 dosesMax Daily Amount: 4 tablets   Patient not taking: Reported on 10/9/2024   LORazepam (ATIVAN) 1 mg tablet   No No   Sig: Take 1 tablet (1 mg total) by mouth 3 (three) times a day as needed for anxiety for up to 5 days   Magnesium Hydroxide (MAGNESIA PO)  Self Yes No   Sig: Take by mouth   Melatonin 10 MG CAPS  Self Yes No   SUMAtriptan (Imitrex) 50 mg tablet   No No   Sig: Take 1 tablet (50 mg total) by mouth if needed for migraine (Take one tab at the start of the headache, if no relief can repeat the dose in 2 hours but not more than 3 doses in 24 hours.) for up to 1 dose   acetaminophen (TYLENOL) 650 mg CR tablet   Yes No   Si mg every 8 (eight) hours as needed   albuterol (PROVENTIL HFA,VENTOLIN HFA) 90 mcg/act inhaler   Yes No   Sig: Inhale 2 puffs every 6 (six) hours as needed   amitriptyline (ELAVIL) 10 mg tablet   No No   Sig: Start with 10 mg at bed time, if no relief and able to tolerate please increase the dose to 20 mg at bed time.   baclofen 10 mg tablet  Self No No   Sig: Take 1 tablet (10 mg total) by mouth 3 (three) times a day as needed for muscle spasms   betamethasone dipropionate (DIPROSONE) 0.05 % cream   Yes No   Sig: Apply 1 application. topically 2 (two) times a day   busPIRone (BUSPAR) 10 mg tablet   Yes No   Sig: Take 1 tablet by mouth 2 (two) times a day   desoximetasone (TOPICORT) 0.05 % cream   Yes No   Sig: APPLY TO AFFECTED AREA(S) OF RASH/IRRITATION TWICE DAILY AS DIRECTED   diazepam (VALIUM) 5 mg tablet  Self No No   Sig: Take 1 tablet (5 mg total) by mouth every 8 (eight) hours as needed for anxiety   diclofenac (VOLTAREN) 75 mg EC tablet  Self No No   Sig: Take 1 tablet (75 mg total) by mouth 2 (two) times a day   diclofenac sodium (VOLTAREN) 1 %  Self No No   Sig: Apply 2 g topically 4 (four) times a day as needed (pain)    Patient not taking: Reported on 10/2/2020   dicyclomine (BENTYL) 20 mg tablet   No No   Sig: Take 1 tablet (20 mg total) by mouth 2 (two) times a day for 3 days   Patient not taking: Reported on 10/9/2024   doxepin (SINEquan) 25 mg capsule   Yes No   Sig: TAKE ONE (1) TO TWO (2) CAPSULES BY MOUTH AT BEDTIME, AS NEEDED FOR SLEEP   etanercept (ENBREL) 50 mg/mL SOSY  Self Yes No   Sig: Inject under the skin once a week   Patient not taking: Reported on 6/12/2023   folic acid (FOLVITE) 1 mg tablet   Yes No   Sig: Take 1 tablet by mouth daily   hydrOXYzine HCL (ATARAX) 25 mg tablet  Self Yes No   Sig: Take 25 mg by mouth 3 (three) times a day   ketorolac (TORADOL) 10 mg tablet  Self No No   Sig: Take 1 tablet (10 mg total) by mouth 2 (two) times a day as needed for moderate pain   magnesium oxide (MAG-OX) 400 mg   No No   Sig: Take 1 tablet (400 mg total) by mouth 2 (two) times a day for 14 days   meclizine (ANTIVERT) 25 mg tablet  Self No No   Sig: Take 1 tablet (25 mg total) by mouth 3 (three) times a day as needed for dizziness   methocarbamol (ROBAXIN) 500 mg tablet  Self No No   Sig: Take 1 tablet (500 mg total) by mouth 3 (three) times a day as needed for muscle spasms   methotrexate 2.5 MG tablet   Yes No   methylPREDNISolone 4 MG tablet therapy pack   Yes No   Sig: TAKE 6 TABLETS ON DAY 1 AS DIRECTED ON PACKAGE AND DECREASE BY 1 TAB EACH DAY FOR A TOTAL OF 6 DAYS   ondansetron (ZOFRAN-ODT) 4 mg disintegrating tablet  Self No No   Sig: Take 1 tablet (4 mg total) by mouth every 6 (six) hours as needed for nausea or vomiting   oxyCODONE-acetaminophen (PERCOCET) 5-325 mg per tablet  Self No No   Sig: Take 1 tablet by mouth every 8 (eight) hours as needed for moderate painMax Daily Amount: 3 tablets   Patient not taking: Reported on 6/12/2023   oxybutynin (DITROPAN-XL) 10 MG 24 hr tablet   Yes No   Sig: Take 10 mg by mouth daily   pantoprazole (PROTONIX) 40 mg tablet   Yes No   Sig: Take 40 mg by mouth daily    predniSONE 20 mg tablet  Self No No   Sig: Take 2 tablets (40 mg total) by mouth daily   tocilizumab (Actemra) 80 mg/4 mL   Yes No   Sig: Inject into a catheter in a vein      Facility-Administered Medications: None     Patient's Medications   Discharge Prescriptions    No medications on file     No discharge procedures on file.  ED SEPSIS DOCUMENTATION   Time reflects when diagnosis was documented in both MDM as applicable and the Disposition within this note       Time User Action Codes Description Comment    1/14/2025 11:09 PM Denis Amado Add [R51.9] Headache                  Denis Amado MD  01/14/25 6025

## 2025-02-19 ENCOUNTER — TELEMEDICINE (OUTPATIENT)
Age: 40
End: 2025-02-19
Payer: COMMERCIAL

## 2025-02-19 VITALS — WEIGHT: 127 LBS | HEIGHT: 63 IN | BODY MASS INDEX: 22.5 KG/M2

## 2025-02-19 DIAGNOSIS — G43.909 MIGRAINE HEADACHE: Primary | ICD-10-CM

## 2025-02-19 DIAGNOSIS — I72.9 ANEURYSM (HCC): ICD-10-CM

## 2025-02-19 PROCEDURE — 99214 OFFICE O/P EST MOD 30 MIN: CPT | Performed by: PSYCHIATRY & NEUROLOGY

## 2025-02-19 RX ORDER — TRIAMCINOLONE ACETONIDE 5 MG/G
1 CREAM TOPICAL 2 TIMES DAILY
COMMUNITY
Start: 2024-06-04 | End: 2025-06-04

## 2025-02-19 RX ORDER — PREDNISONE 5 MG/1
TABLET ORAL
COMMUNITY
Start: 2024-11-20

## 2025-02-19 RX ORDER — FAMOTIDINE 20 MG/1
TABLET, FILM COATED ORAL
COMMUNITY
Start: 2025-02-15

## 2025-02-19 RX ORDER — TOPIRAMATE 50 MG/1
TABLET, FILM COATED ORAL
Qty: 90 TABLET | Refills: 3 | Status: SHIPPED | OUTPATIENT
Start: 2025-02-19

## 2025-02-19 RX ORDER — DEXAMETHASONE 2 MG/1
TABLET ORAL
Qty: 16 TABLET | Refills: 0 | Status: SHIPPED | OUTPATIENT
Start: 2025-02-19

## 2025-02-19 RX ORDER — ALPRAZOLAM 0.5 MG
TABLET ORAL
COMMUNITY
Start: 2024-12-04

## 2025-02-19 NOTE — PROGRESS NOTES
Neurology Follow up- Virtual Regular Visit      Name: Roseanne Maldonado      : 1985      MRN: [id number]  Encounter Provider: Tenisha Chiang MD  Encounter Date: 2025   Encounter department: [facility] NEUROLOGY [facility]      History of Present Illness       INTERIM HISTORY:  She experiences severe headaches that are sometimes so intense she feels she cannot stand them. The headaches are located in her temples and are persistent, occurring all the time. Additional pain is noted in the back of her head.    She has undergone an MRI and CT scan, both of which were normal. Blood vessel imaging was also performed, though the results were not discussed in detail. A follow-up MRI or CT scan is scheduled for next month with neurosurgery.    She has been taking amitriptyline for her headaches but cannot recall the name of the medication. She has not taken Imitrex, which was previously prescribed.    She reports very few hours of sleep due to the severity of her headaches and additional pain in the back of her head, which prevents her from finding a comfortable position to sleep.    She has rheumatoid arthritis and uses prednisolone during outbreaks.    PRIOR NOTES:    Headache description:      Onset of headaches: suddenly 2 months back--different from her typical migraine   Location of headaches: bilateral, occipital, and frontal  Radiation: Yes, radiates to the back    Quality of the pain: pressure and throbbing  Intensity of the headache: At present: 4/10;  At its worst:  10/10  Duration of the headaches:week(s)  Frequency of the headaches:daily  Presence of aura:  Yes, vertigo like sensation and chest pain  Associated symptoms with headaches: no vomiting  and +nausea, no light sensitivity, sound sensitivity present--she also feels pressure behind the eyes, sometimes they have swollen.   Triggers:No   Positional component: Yes   Alleviating factors: Yes, ice helps   Any specific time of the day  "when get the headaches: no particular time of day     Red Flags for headache:  Age <5 or >50: No  First worst headaches: No  Maximal at intensity: sometimes they are intense at the maximum, sometimes she would wake up with the headaches.   Change in pattern: Yes  New headache in pregnancy, cancer or immunocompromised patient: RA (prednisone as needed, previously on Embrel) and Fibromyalgia   Loss of consciousness or convulsions: none  Headache triggered by exertion, Valsalva maneuver or sexual activity: none  Abnormal exam: please see below in Neurological examination.     Social history:   Family history of migraine headaches: Yes, father had migraine headaches  History of neck and head trauma: No  Smoking status: No  Oral contraceptive use: No           Life style factors:  -Hydration: inadequate  -Sleep: \"bad\", she only sleeps for about 4 hours.   -Caffeine intake: 1 cups of caffeinated coffee per day(s)  -Alcohol intake: socially        Treatment:  -Over the counter medications tried for headaches:   Tylenol       -Prescription medications:  Abortive or Rescue Medications used:   No rescue medicine tried in the past       Preventative or daily medications used for headaches:   Baclofen does not help with headaches           Other significant co morbidities:  RA on DMT, prednisone as needed.   Asthma  Anxiety  Fibromyalgia        Objective   Ht 5' 3\" (1.6 m)   Wt 57.6 kg (127 lb)   LMP 02/14/2025 (Exact Date)   BMI 22.50 kg/m²     Neurological exam:   Unable to assess as this is a telemedicine visit    Labs:  Component  Ref Range & Units (hover) 1/9/25  9:08 PM 10/25/24  3:59 PM 10/24/24 11:07 AM   Sodium 138 136 R 140 R   Potassium 3.5 3.5 R 4.6 R   Chloride 105 108 R 107 R   CO2 26 20 Low  R 24 R   ANION GAP 7 8 R 9 R   BUN 19 14 R 18 R   Creatinine 0.65 0.77 R 0.79 R   Comment: Standardized to IDMS reference method   Glucose 102 125 High  R 87 R   Comment: If the patient is fasting, the ADA then defines " impaired fasting glucose as > 100 mg/dL and diabetes as > or equal to 123 mg/dL.   Calcium 9.2 8.9 R, CM 9.2 R, CM   AST 11 Low  15 R    ALT 7 9 R    Comment: Specimen collection should occur prior to Sulfasalazine administration due to the potential for falsely depressed results.   Alkaline Phosphatase 62 51 R    Total Protein 7.0 7.4 R    Albumin 4.3 4.5 R    Total Bilirubin 0.39          Component  Ref Range & Units (hover) 1/9/25  9:08 PM 9/21/24  8:55 AM 9/2/24  1:25 PM   WBC 7.77 7.60 3.67 Low    RBC 4.88 4.38 4.59   Hemoglobin 14.0 10.4 Low  11.2 Low    Hematocrit 42.0 33.0 Low  34.1 Low    MCV 86 75 Low  74 Low    MCH 28.7 23.7 Low  24.4 Low    MCHC 33.3 31.5 32.8   RDW 14.2 17.5 High  17.2 High    MPV 10.8 11.0 10.6   Platelets 293 280 361   nRBC 0 0 0   Segmented % 67 57 34 Low    Immature Grans % 0 0 0   Lymphocytes % 21 24 47 High    Monocytes % 8 13 High  13 High    Eosinophils Relative 3 5 5   Basophils Relative 1 1 1   Absolute Neutrophils 5.30 4.34 1.26 Low    Absolute Immature Grans 0.01 0.02 0.01   Absolute Lymphocytes 1.61 1.82 1.70   Absolute Monocytes 0.61 0.99 0.48   Eosinophils Absolute 0.20 0.39 0.19   Basophils Absolute 0.04 0.04         Neuroimaging:   MRI Brain 11/1/2024   IMPRESSION:     Normal MRI of the brain. No white matter lesions to suggest demyelinating disease.     Workstation performed: [id number]  [facility] 9/21/2024  IMPRESSION:     No acute intracranial abnormality. There is no subarachnoid hemorrhage           Results for orders placed during the hospital encounter of 06/06/23     MRI cervical spine wo contrast     Impression  Normal MRI of the cervical spine.         CTA head and neck 1/9/2025 as per radiology     IMPRESSION:     1.  No acute intracranial abnormality.  2.  Previously described right P-comm infundibulum versus aneurysm is not well visualized on this study.  3.  No stenosis or occlusion of the carotid or vertebral arteries or major vessels of the [place].              iPad  #[id number] was present for the office visit as a Irish/English      Assessment & Plan  Migraine headache  Ms. Maldonado is a very pleasant 39 y.o. female presenting with severe headaches, sometimes unbearable, with normal previous MRI and CT scans and unremarkable blood vessel imaging. Current treatment with amitriptyline and Imitrex is ineffective. Reports inadequate sleep due to headaches and associated occipital pain. Decision to discontinue current medications and initiate a new treatment regimen for more effective symptom management.  - Discontinue amitriptyline and Imitrex  - Prescribe Nurtec for headache management  - Prescribe dexamethasone to help break the headache cycle  - Initiate Topamax with gradual titration as per instructions--  Week 1: Start with 25 mg at bed time.   Week 2: Take 25 mg in the morning and 25 mg in the evening  Week 3: Take 25 mg in the morning and 50 mg in the evening  Week 4: Take 50 mg in the morning and 50 mg in the evening.              Administrative Statements   Encounter provider     Tenisha Oliver MD.                  The Patient is located at Home and in the following [facility] in which I hold an active license [place].    The patient was identified by name and date of birth. Roseanne Maldonado was informed that this is a telemedicine visit and that the visit is being conducted through the Epic Embedded platform. She agrees to proceed..  My office door was closed. No one else was in the room.  She acknowledged consent and understanding of privacy and security of the video platform. The patient has agreed to participate and understands they can discontinue the visit at any time.    I have spent a total time of 32 minutes in caring for this patient on the day of the visit/encounter including Diagnostic results, Prognosis, Risks and benefits of tx options, Instructions for management, Patient and family education, Importance of tx compliance,  Risk factor reductions, Impressions, Counseling / Coordination of care, Documenting in the medical record, Reviewing/placing orders in the medical record (including tests, medications, and/or procedures), and Obtaining or reviewing history  .

## 2025-02-20 ENCOUNTER — TELEPHONE (OUTPATIENT)
Dept: OTOLARYNGOLOGY | Facility: CLINIC | Age: 40
End: 2025-02-20

## 2025-04-06 ENCOUNTER — HOSPITAL ENCOUNTER (EMERGENCY)
Facility: HOSPITAL | Age: 40
Discharge: HOME | End: 2025-04-06
Payer: COMMERCIAL

## 2025-04-06 ENCOUNTER — APPOINTMENT (EMERGENCY)
Dept: ULTRASOUND IMAGING | Facility: HOSPITAL | Age: 40
End: 2025-04-06
Payer: COMMERCIAL

## 2025-04-06 VITALS
OXYGEN SATURATION: 100 % | BODY MASS INDEX: 22.38 KG/M2 | RESPIRATION RATE: 18 BRPM | SYSTOLIC BLOOD PRESSURE: 116 MMHG | DIASTOLIC BLOOD PRESSURE: 72 MMHG | TEMPERATURE: 97.5 F | WEIGHT: 126.32 LBS | HEART RATE: 76 BPM

## 2025-04-06 DIAGNOSIS — R19.7 VOMITING AND DIARRHEA: Primary | ICD-10-CM

## 2025-04-06 DIAGNOSIS — R11.10 VOMITING AND DIARRHEA: Primary | ICD-10-CM

## 2025-04-06 LAB
ALBUMIN SERPL BCG-MCNC: 4.4 G/DL (ref 3.5–5)
ALP SERPL-CCNC: 47 U/L (ref 34–104)
ALT SERPL W P-5'-P-CCNC: 9 U/L (ref 7–52)
ANION GAP SERPL CALCULATED.3IONS-SCNC: 8 MMOL/L (ref 4–13)
AST SERPL W P-5'-P-CCNC: 14 U/L (ref 13–39)
BASOPHILS # BLD AUTO: 0.02 THOUSANDS/ÂΜL (ref 0–0.1)
BASOPHILS NFR BLD AUTO: 0 % (ref 0–1)
BILIRUB SERPL-MCNC: 1.79 MG/DL (ref 0.2–1)
BUN SERPL-MCNC: 12 MG/DL (ref 5–25)
CALCIUM SERPL-MCNC: 8.4 MG/DL (ref 8.4–10.2)
CHLORIDE SERPL-SCNC: 106 MMOL/L (ref 96–108)
CO2 SERPL-SCNC: 24 MMOL/L (ref 21–32)
CREAT SERPL-MCNC: 0.71 MG/DL (ref 0.6–1.3)
EOSINOPHIL # BLD AUTO: 0.07 THOUSAND/ÂΜL (ref 0–0.61)
EOSINOPHIL NFR BLD AUTO: 1 % (ref 0–6)
ERYTHROCYTE [DISTWIDTH] IN BLOOD BY AUTOMATED COUNT: 12.4 % (ref 11.6–15.1)
GFR SERPL CREATININE-BSD FRML MDRD: 106 ML/MIN/1.73SQ M
GLUCOSE SERPL-MCNC: 93 MG/DL (ref 65–140)
HCT VFR BLD AUTO: 42.5 % (ref 34.8–46.1)
HGB BLD-MCNC: 14.3 G/DL (ref 11.5–15.4)
IMM GRANULOCYTES # BLD AUTO: 0.01 THOUSAND/UL (ref 0–0.2)
IMM GRANULOCYTES NFR BLD AUTO: 0 % (ref 0–2)
LACTATE SERPL-SCNC: 0.7 MMOL/L (ref 0.5–2)
LIPASE SERPL-CCNC: 13 U/L (ref 11–82)
LYMPHOCYTES # BLD AUTO: 0.53 THOUSANDS/ÂΜL (ref 0.6–4.47)
LYMPHOCYTES NFR BLD AUTO: 9 % (ref 14–44)
MCH RBC QN AUTO: 29.3 PG (ref 26.8–34.3)
MCHC RBC AUTO-ENTMCNC: 33.6 G/DL (ref 31.4–37.4)
MCV RBC AUTO: 87 FL (ref 82–98)
MONOCYTES # BLD AUTO: 0.48 THOUSAND/ÂΜL (ref 0.17–1.22)
MONOCYTES NFR BLD AUTO: 8 % (ref 4–12)
NEUTROPHILS # BLD AUTO: 5 THOUSANDS/ÂΜL (ref 1.85–7.62)
NEUTS SEG NFR BLD AUTO: 82 % (ref 43–75)
NRBC BLD AUTO-RTO: 0 /100 WBCS
PLATELET # BLD AUTO: 201 THOUSANDS/UL (ref 149–390)
PMV BLD AUTO: 10.7 FL (ref 8.9–12.7)
POTASSIUM SERPL-SCNC: 3.5 MMOL/L (ref 3.5–5.3)
PROT SERPL-MCNC: 7 G/DL (ref 6.4–8.4)
RBC # BLD AUTO: 4.88 MILLION/UL (ref 3.81–5.12)
SODIUM SERPL-SCNC: 138 MMOL/L (ref 135–147)
WBC # BLD AUTO: 6.11 THOUSAND/UL (ref 4.31–10.16)

## 2025-04-06 PROCEDURE — 83690 ASSAY OF LIPASE: CPT | Performed by: NURSE PRACTITIONER

## 2025-04-06 PROCEDURE — 80053 COMPREHEN METABOLIC PANEL: CPT | Performed by: NURSE PRACTITIONER

## 2025-04-06 PROCEDURE — 76705 ECHO EXAM OF ABDOMEN: CPT

## 2025-04-06 PROCEDURE — 96375 TX/PRO/DX INJ NEW DRUG ADDON: CPT

## 2025-04-06 PROCEDURE — 83605 ASSAY OF LACTIC ACID: CPT | Performed by: NURSE PRACTITIONER

## 2025-04-06 PROCEDURE — 36415 COLL VENOUS BLD VENIPUNCTURE: CPT | Performed by: NURSE PRACTITIONER

## 2025-04-06 PROCEDURE — 99284 EMERGENCY DEPT VISIT MOD MDM: CPT

## 2025-04-06 PROCEDURE — 85025 COMPLETE CBC W/AUTO DIFF WBC: CPT | Performed by: NURSE PRACTITIONER

## 2025-04-06 PROCEDURE — 96374 THER/PROPH/DIAG INJ IV PUSH: CPT

## 2025-04-06 PROCEDURE — 99284 EMERGENCY DEPT VISIT MOD MDM: CPT | Performed by: NURSE PRACTITIONER

## 2025-04-06 PROCEDURE — 96361 HYDRATE IV INFUSION ADD-ON: CPT

## 2025-04-06 RX ORDER — ONDANSETRON 2 MG/ML
4 INJECTION INTRAMUSCULAR; INTRAVENOUS ONCE
Status: DISCONTINUED | OUTPATIENT
Start: 2025-04-06 | End: 2025-04-06

## 2025-04-06 RX ORDER — ONDANSETRON 2 MG/ML
4 INJECTION INTRAMUSCULAR; INTRAVENOUS ONCE
Status: COMPLETED | OUTPATIENT
Start: 2025-04-06 | End: 2025-04-06

## 2025-04-06 RX ORDER — ONDANSETRON 4 MG/1
4 TABLET, ORALLY DISINTEGRATING ORAL EVERY 8 HOURS PRN
Qty: 20 TABLET | Refills: 0 | Status: SHIPPED | OUTPATIENT
Start: 2025-04-06

## 2025-04-06 RX ORDER — KETOROLAC TROMETHAMINE 30 MG/ML
15 INJECTION, SOLUTION INTRAMUSCULAR; INTRAVENOUS ONCE
Status: COMPLETED | OUTPATIENT
Start: 2025-04-06 | End: 2025-04-06

## 2025-04-06 RX ORDER — ACETAMINOPHEN 325 MG/1
975 TABLET ORAL ONCE
Status: COMPLETED | OUTPATIENT
Start: 2025-04-06 | End: 2025-04-06

## 2025-04-06 RX ADMIN — ACETAMINOPHEN 975 MG: 325 TABLET, FILM COATED ORAL at 11:05

## 2025-04-06 RX ADMIN — KETOROLAC TROMETHAMINE 15 MG: 30 INJECTION, SOLUTION INTRAMUSCULAR; INTRAVENOUS at 11:00

## 2025-04-06 RX ADMIN — SODIUM CHLORIDE 1000 ML: 0.9 INJECTION, SOLUTION INTRAVENOUS at 11:01

## 2025-04-06 RX ADMIN — ONDANSETRON 4 MG: 2 INJECTION INTRAMUSCULAR; INTRAVENOUS at 11:01

## 2025-04-06 NOTE — ED PROVIDER NOTES
Time reflects when diagnosis was documented in both MDM as applicable and the Disposition within this note       Time User Action Codes Description Comment    4/6/2025 11:57 AM Chele Grimes Add [R11.10,  R19.7] Vomiting and diarrhea           ED Disposition       ED Disposition   Discharge    Condition   Stable    Date/Time   Sun Apr 6, 2025 11:57 AM    Comment   Roseannelisa Maldonado discharge to home/self care.                   Assessment & Plan       Medical Decision Making  Ultrasound of the abdomen showing some gallbladder sludge but no evidence of acute cholecystitis or obstruction.  Additionally no evidence of transaminitis.  Patient's symptoms are most likely viral in nature.  Will give some antiemetics for support    Amount and/or Complexity of Data Reviewed  Labs: ordered.  Radiology: ordered.    Risk  OTC drugs.  Prescription drug management.             Medications   sodium chloride 0.9 % bolus 1,000 mL (0 mL Intravenous Stopped 4/6/25 1208)   ondansetron (ZOFRAN) injection 4 mg (4 mg Intravenous Given 4/6/25 1101)   ketorolac (TORADOL) injection 15 mg (15 mg Intravenous Given 4/6/25 1100)   acetaminophen (TYLENOL) tablet 975 mg (975 mg Oral Given 4/6/25 1105)       ED Risk Strat Scores                                                History of Present Illness       Chief Complaint   Patient presents with    Vomiting     Pt c/o vomiting and diarrhea that started yesterday, +joint pain        Past Medical History:   Diagnosis Date    Anxiety     Arthritis     Asthma     CTS (carpal tunnel syndrome) 2 años    Fibromyalgia     Fibromyalgia, primary 5 años    Headache, tension-type     Migraine     Panic attack     Rheumatoid arthritis (HCC)       Past Surgical History:   Procedure Laterality Date    CARPAL TUNNEL RELEASE Right     TUBAL LIGATION      TUBAL LIGATION        Family History   Problem Relation Age of Onset    Migraines Sister       Social History     Tobacco Use    Smoking status: Never    Smokeless  tobacco: Never   Vaping Use    Vaping status: Never Used   Substance Use Topics    Alcohol use: Yes     Alcohol/week: 1.0 standard drink of alcohol     Types: 1 Glasses of wine per week     Comment: occ    Drug use: No      E-Cigarette/Vaping    E-Cigarette Use Never User       E-Cigarette/Vaping Substances      I have reviewed and agree with the history as documented.     39-year-old female presenting here with generalized bodyaches and joint pains with a history of underlying rheumatoid arthritis.  With a chief complaint complaining of nausea vomiting and diarrhea that started yesterday.  No reported fevers.      Vomiting  Associated symptoms: arthralgias and diarrhea    Associated symptoms: no chills, no cough, no fever and no sore throat        Review of Systems   Constitutional:  Negative for chills and fever.   HENT:  Negative for dental problem, drooling, ear pain, facial swelling, postnasal drip, sore throat, trouble swallowing and voice change.    Eyes:  Negative for pain and redness.   Respiratory:  Negative for cough and choking.    Cardiovascular:  Negative for chest pain and palpitations.   Gastrointestinal:  Positive for diarrhea, nausea and vomiting.   Musculoskeletal:  Positive for arthralgias. Negative for neck pain and neck stiffness.   Skin:  Negative for rash.   Neurological:  Negative for facial asymmetry and light-headedness.           Objective       ED Triage Vitals   Temperature Pulse Blood Pressure Respirations SpO2 Patient Position - Orthostatic VS   04/06/25 1032 04/06/25 1032 04/06/25 1032 04/06/25 1032 04/06/25 1032 04/06/25 1032   97.5 °F (36.4 °C) 90 112/76 18 99 % Sitting      Temp Source Heart Rate Source BP Location FiO2 (%) Pain Score    04/06/25 1032 04/06/25 1032 04/06/25 1032 -- 04/06/25 1100    Temporal Monitor Left arm  8      Vitals      Date and Time Temp Pulse SpO2 Resp BP Pain Score FACES Pain Rating User   04/06/25 1200 -- 76 100 % -- 116/72 -- -- KM   04/06/25 1115 --  78 100 % 18 118/75 -- --    04/06/25 1105 -- -- -- -- -- 8 --    04/06/25 1100 -- -- -- -- -- 8 --    04/06/25 1032 97.5 °F (36.4 °C) 90 99 % 18 112/76 -- -- AG            Physical Exam  Vitals and nursing note reviewed.   Constitutional:       General: She is not in acute distress.     Appearance: She is well-developed. She is not ill-appearing or toxic-appearing.   HENT:      Head: Normocephalic and atraumatic.      Nose: No rhinorrhea.      Mouth/Throat:      Mouth: Mucous membranes are moist.      Dentition: Normal dentition.   Eyes:      General:         Right eye: No discharge.         Left eye: No discharge.   Cardiovascular:      Rate and Rhythm: Normal rate and regular rhythm.   Pulmonary:      Effort: Pulmonary effort is normal. No accessory muscle usage or respiratory distress.   Abdominal:      General: There is no distension.      Tenderness: There is no guarding.   Musculoskeletal:         General: Normal range of motion.      Cervical back: Normal range of motion and neck supple. No rigidity.   Skin:     General: Skin is warm and dry.   Neurological:      Mental Status: She is alert and oriented to person, place, and time.      Coordination: Coordination normal.   Psychiatric:         Behavior: Behavior is cooperative.         Results Reviewed       Procedure Component Value Units Date/Time    Comprehensive metabolic panel [[id number]]  (Abnormal) Collected: 04/06/25 1059    Lab Status: Final result Specimen: Blood from Arm, Right Updated: 04/06/25 1129     Sodium 138 mmol/L      Potassium 3.5 mmol/L      Chloride 106 mmol/L      CO2 24 mmol/L      ANION GAP 8 mmol/L      BUN 12 mg/dL      Creatinine 0.71 mg/dL      Glucose 93 mg/dL      Calcium 8.4 mg/dL      AST 14 U/L      ALT 9 U/L      Alkaline Phosphatase 47 U/L      Total Protein 7.0 g/dL      Albumin 4.4 g/dL      Total Bilirubin 1.79 mg/dL      eGFR 106 ml/min/1.73sq m     Narrative:      National Kid[employer] Foundation guidelines for  Chronic Kidney Disease (CKD):     Stage 1 with normal or high GFR (GFR > 90 mL/min/1.73 square meters)    Stage 2 Mild CKD (GFR = 60-89 mL/min/1.73 square meters)    Stage 3A Moderate CKD (GFR = 45-59 mL/min/1.73 square meters)    Stage 3B Moderate CKD (GFR = 30-44 mL/min/1.73 square meters)    Stage 4 Severe CKD (GFR = 15-29 mL/min/1.73 square meters)    Stage 5 End Stage CKD (GFR <15 mL/min/1.73 square meters)  Note: GFR calculation is accurate only with a steady state creatinine    Lipase [[id number]]  (Normal) Collected: 04/06/25 1059    Lab Status: Final result Specimen: Blood from Arm, Right Updated: 04/06/25 1129     Lipase 13 u/L     Lactic acid, plasma (w/reflex if result > 2.0) [[id number]]  (Normal) Collected: 04/06/25 1059    Lab Status: Final result Specimen: Blood from Arm, Right Updated: 04/06/25 1127     LACTIC ACID 0.7 mmol/L     Narrative:      Result may be elevated if tourniquet was used during collection.    CBC and differential [[id number]]  (Abnormal) Collected: 04/06/25 1059    Lab Status: Final result Specimen: Blood from Arm, Right Updated: 04/06/25 1108     WBC 6.11 Thousand/uL      RBC 4.88 Million/uL      Hemoglobin 14.3 g/dL      Hematocrit 42.5 %      MCV 87 fL      MCH 29.3 pg      MCHC 33.6 g/dL      RDW 12.4 %      MPV 10.7 fL      Platelets 201 Thousands/uL      nRBC 0 /100 WBCs      Segmented % 82 %      Immature Grans % 0 %      Lymphocytes % 9 %      Monocytes % 8 %      Eosinophils Relative 1 %      Basophils Relative 0 %      Absolute Neutrophils 5.00 Thousands/µL      Absolute Immature Grans 0.01 Thousand/uL      Absolute Lymphocytes 0.53 Thousands/µL      Absolute Monocytes 0.48 Thousand/µL      Eosinophils Absolute 0.07 Thousand/µL      Basophils Absolute 0.02 Thousands/µL             US right upper quadrant   Final Interpretation by Ash Quigley MD (04/06 1148)      Mild gallbladder sludge without evidence of acute cholecystitis.      Gallbladder polyp measuring 3 mm.  According to current consensus recommendations, for polyps of this size ( <=  9 mm) which have an extremely low risk morphology, no follow-up is recommended.      Workstation performed: [id number]             Procedures    ED Medication and Procedure Management   Prior to Admission Medications   Prescriptions Last Dose Informant Patient Reported? Taking?   ALPRAZolam (XANAX) 0.5 mg tablet   Yes No   Sig: TAKE 1 TABLET BY MOUTH DAILY AS NEEDED FOR ANXIETY PRIOR TO EACH PLANE FLIGHT   ALPRAZolam (XANAX) 1 mg tablet   Yes No   Sig: TAKE ONE (1) TABLET BY MOUTH ONCE A DAY, AS NEEDED PRIOR TO AIRPLANE TRIP   EPINEPHrine (EPIPEN) 0.3 mg/0.3 mL SOAJ   No No   Sig: Inject 0.3 mL into the shoulder, thigh, or buttocks once for 1 dose If needed for allergic reaction.  Proceed immediately to ED if you use the EpiPen.   Patient not taking: Reported on 2025   FLUoxetine (PROzac) 40 MG capsule   Yes No   Sig: Take 1 capsule by mouth daily   HYDROcodone-acetaminophen (NORCO) 5-325 mg per tablet  Self No No   Sig: Take 1 tablet by mouth every 6 (six) hours as needed (Not relieved by Anti-inflammatory) for up to 12 dosesMax Daily Amount: 4 tablets   LORazepam (ATIVAN) 1 mg tablet   No No   Sig: Take 1 tablet (1 mg total) by mouth 3 (three) times a day as needed for anxiety for up to 5 days   Magnesium Hydroxide (MAGNESIA PO)  Self Yes No   Sig: Take by mouth   Melatonin 10 MG CAPS  Self Yes No   acetaminophen (TYLENOL) 650 mg CR tablet   Yes No   Si mg every 8 (eight) hours as needed   albuterol (PROVENTIL HFA,VENTOLIN HFA) 90 mcg/act inhaler   Yes No   Sig: Inhale 2 puffs every 6 (six) hours as needed   baclofen 10 mg tablet  Self No No   Sig: Take 1 tablet (10 mg total) by mouth 3 (three) times a day as needed for muscle spasms   betamethasone dipropionate (DIPROSONE) 0.05 % cream   Yes No   Sig: Apply 1 application. topically 2 (two) times a day   busPIRone (BUSPAR) 10 mg tablet   Yes No   Sig: Take 1 tablet by mouth 2  (two) times a day   desoximetasone (TOPICORT) 0.05 % cream   Yes No   Sig: APPLY TO AFFECTED AREA(S) OF RASH/IRRITATION TWICE DAILY AS DIRECTED   dexamethasone (DECADRON) 2 mg tablet   No No   Sig: Take 4 mg (2 tabs) for 2 days, then decrease it to 2 mg (1 tab) for 2 days, then decrease it to 1 mg (0.5 tab) for 2 days and then stop the medicine.   diazepam (VALIUM) 5 mg tablet  Self No No   Sig: Take 1 tablet (5 mg total) by mouth every 8 (eight) hours as needed for anxiety   diclofenac (VOLTAREN) 75 mg EC tablet  Self No No   Sig: Take 1 tablet (75 mg total) by mouth 2 (two) times a day   diclofenac sodium (VOLTAREN) 1 %  Self No No   Sig: Apply 2 g topically 4 (four) times a day as needed (pain)   dicyclomine (BENTYL) 20 mg tablet   No No   Sig: Take 1 tablet (20 mg total) by mouth 2 (two) times a day for 3 days   doxepin (SINEquan) 25 mg capsule   Yes No   Sig: TAKE ONE (1) TO TWO (2) CAPSULES BY MOUTH AT BEDTIME, AS NEEDED FOR SLEEP   famotidine (PEPCID) 20 mg tablet   Yes No   folic acid (FOLVITE) 1 mg tablet   Yes No   Sig: Take 1 tablet by mouth daily   hydrOXYzine HCL (ATARAX) 25 mg tablet  Self Yes No   Sig: Take 25 mg by mouth 3 (three) times a day   ketorolac (TORADOL) 10 mg tablet  Self No No   Sig: Take 1 tablet (10 mg total) by mouth 2 (two) times a day as needed for moderate pain   magnesium oxide (MAG-OX) 400 mg   No No   Sig: Take 1 tablet (400 mg total) by mouth 2 (two) times a day for 14 days   meclizine (ANTIVERT) 25 mg tablet  Self No No   Sig: Take 1 tablet (25 mg total) by mouth 3 (three) times a day as needed for dizziness   methocarbamol (ROBAXIN) 500 mg tablet  Self No No   Sig: Take 1 tablet (500 mg total) by mouth 3 (three) times a day as needed for muscle spasms   methylPREDNISolone 4 MG tablet therapy pack   Yes No   Sig: TAKE 6 TABLETS ON DAY 1 AS DIRECTED ON PACKAGE AND DECREASE BY 1 TAB EACH DAY FOR A TOTAL OF 6 DAYS   ondansetron (ZOFRAN-ODT) 4 mg disintegrating tablet  Self No No    Sig: Take 1 tablet (4 mg total) by mouth every 6 (six) hours as needed for nausea or vomiting   oxybutynin (DITROPAN-XL) 10 MG 24 hr tablet   Yes No   Sig: Take 10 mg by mouth daily   pantoprazole (PROTONIX) 40 mg tablet   Yes No   Sig: Take 40 mg by mouth daily   predniSONE 20 mg tablet  Self No No   Sig: Take 2 tablets (40 mg total) by mouth daily   predniSONE 5 mg tablet   Yes No   Sig: TAKE 1-2 TABS DAILY X 2-3 DAYS AS NEEDED FOR SEVERE PAIN   rimegepant sulfate (NURTEC) 75 mg TBDP   No No   Sig: Take 1 tablet (75 mg total) by mouth daily as needed (migraine headaches)   tocilizumab (Actemra) 80 mg/4 mL   Yes No   Sig: Inject into a catheter in a vein   topiramate (Topamax) 50 MG tablet   No No   Sig: Week 1: Start with 25 mg at bed time. Week 2: Take 25 mg in the morning and 25 mg in the evening Week 3: Take 25 mg in the morning and 50 mg in the evening Week 4: Take 50 mg in the morning and 50 mg in the evening.   triamcinolone (KENALOG) 0.5 % cream   Yes No   Sig: Apply 1 application. topically 2 (two) times a day      Facility-Administered Medications: None     Discharge Medication List as of 4/6/2025 11:58 AM        START taking these medications    Details   !! ondansetron (ZOFRAN-ODT) 4 mg disintegrating tablet Take 1 tablet (4 mg total) by mouth every 8 (eight) hours as needed for nausea or vomiting for up to 20 doses, Starting Sun 4/6/2025, Normal       !! - Potential duplicate medications found. Please discuss with provider.        CONTINUE these medications which have NOT CHANGED    Details   acetaminophen (TYLENOL) 650 mg CR tablet 650 mg every 8 (eight) hours as needed, Historical Med      albuterol (PROVENTIL HFA,VENTOLIN HFA) 90 mcg/act inhaler Inhale 2 puffs every 6 (six) hours as needed, Historical Med      !! ALPRAZolam (XANAX) 0.5 mg tablet TAKE 1 TABLET BY MOUTH DAILY AS NEEDED FOR ANXIETY PRIOR TO EACH PLANE FLIGHT, Historical Med      !! ALPRAZolam (XANAX) 1 mg tablet TAKE ONE (1) TABLET  BY MOUTH ONCE A DAY, AS NEEDED PRIOR TO AIRPLANE TRIP, Historical Med      baclofen 10 mg tablet Take 1 tablet (10 mg total) by mouth 3 (three) times a day as needed for muscle spasms, Starting Tue 5/16/2023, Normal      betamethasone dipropionate (DIPROSONE) 0.05 % cream Apply 1 application. topically 2 (two) times a day, Starting Mon 12/11/2023, Until Wed 2/19/2025, Historical Med      busPIRone (BUSPAR) 10 mg tablet Take 1 tablet by mouth 2 (two) times a day, Historical Med      desoximetasone (TOPICORT) 0.05 % cream APPLY TO AFFECTED AREA(S) OF RASH/IRRITATION TWICE DAILY AS DIRECTED, Historical Med      dexamethasone (DECADRON) 2 mg tablet Take 4 mg (2 tabs) for 2 days, then decrease it to 2 mg (1 tab) for 2 days, then decrease it to 1 mg (0.5 tab) for 2 days and then stop the medicine., Normal      diazepam (VALIUM) 5 mg tablet Take 1 tablet (5 mg total) by mouth every 8 (eight) hours as needed for anxiety, Starting Fri 5/12/2023, Print      diclofenac (VOLTAREN) 75 mg EC tablet Take 1 tablet (75 mg total) by mouth 2 (two) times a day, Starting Wed 6/14/2023, Normal      diclofenac sodium (VOLTAREN) 1 % Apply 2 g topically 4 (four) times a day as needed (pain), Starting Tue 12/3/2019, Print      dicyclomine (BENTYL) 20 mg tablet Take 1 tablet (20 mg total) by mouth 2 (two) times a day for 3 days, Starting Sat 5/11/2019, Until Wed 2/19/2025, Print      doxepin (SINEquan) 25 mg capsule TAKE ONE (1) TO TWO (2) CAPSULES BY MOUTH AT BEDTIME, AS NEEDED FOR SLEEP, Historical Med      EPINEPHrine (EPIPEN) 0.3 mg/0.3 mL SOAJ Inject 0.3 mL into the shoulder, thigh, or buttocks once for 1 dose If needed for allergic reaction.  Proceed immediately to ED if you use the EpiPen., Starting Mon 8/21/2017, Print      famotidine (PEPCID) 20 mg tablet Historical Med      FLUoxetine (PROzac) 40 MG capsule Take 1 capsule by mouth daily, Historical Med      folic acid (FOLVITE) 1 mg tablet Take 1 tablet by mouth daily, Historical  Med      HYDROcodone-acetaminophen (NORCO) 5-325 mg per tablet Take 1 tablet by mouth every 6 (six) hours as needed (Not relieved by Anti-inflammatory) for up to 12 dosesMax Daily Amount: 4 tablets, Starting Fri 9/6/2019, Print      hydrOXYzine HCL (ATARAX) 25 mg tablet Take 25 mg by mouth 3 (three) times a day, Historical Med      ketorolac (TORADOL) 10 mg tablet Take 1 tablet (10 mg total) by mouth 2 (two) times a day as needed for moderate pain, Starting Tue 5/16/2023, Normal      LORazepam (ATIVAN) 1 mg tablet Take 1 tablet (1 mg total) by mouth 3 (three) times a day as needed for anxiety for up to 5 days, Starting Sat 12/8/2018, Until Wed 2/19/2025 at 2359, Print      Magnesium Hydroxide (MAGNESIA PO) Take by mouth, Historical Med      magnesium oxide (MAG-OX) 400 mg Take 1 tablet (400 mg total) by mouth 2 (two) times a day for 14 days, Starting Fri 10/2/2020, Until Wed 2/19/2025, Normal      meclizine (ANTIVERT) 25 mg tablet Take 1 tablet (25 mg total) by mouth 3 (three) times a day as needed for dizziness, Starting Sat 9/21/2024, Normal      Melatonin 10 MG CAPS Historical Med      methocarbamol (ROBAXIN) 500 mg tablet Take 1 tablet (500 mg total) by mouth 3 (three) times a day as needed for muscle spasms, Starting Wed 7/14/2021, Normal      methylPREDNISolone 4 MG tablet therapy pack TAKE 6 TABLETS ON DAY 1 AS DIRECTED ON PACKAGE AND DECREASE BY 1 TAB EACH DAY FOR A TOTAL OF 6 DAYS, Historical Med      !! ondansetron (ZOFRAN-ODT) 4 mg disintegrating tablet Take 1 tablet (4 mg total) by mouth every 6 (six) hours as needed for nausea or vomiting, Starting Mon 4/22/2024, Normal      oxybutynin (DITROPAN-XL) 10 MG 24 hr tablet Take 10 mg by mouth daily, Starting Mon 10/21/2024, Until Tue 10/21/2025, Historical Med      pantoprazole (PROTONIX) 40 mg tablet Take 40 mg by mouth daily, Historical Med      !! predniSONE 20 mg tablet Take 2 tablets (40 mg total) by mouth daily, Starting Mon 7/13/2020, Print      !!  predniSONE 5 mg tablet TAKE 1-2 TABS DAILY X 2-3 DAYS AS NEEDED FOR SEVERE PAIN, Historical Med      rimegepant sulfate (NURTEC) 75 mg TBDP Take 1 tablet (75 mg total) by mouth daily as needed (migraine headaches), Starting Wed 2/19/2025, Normal      tocilizumab (Actemra) 80 mg/4 mL Inject into a catheter in a vein, Historical Med      topiramate (Topamax) 50 MG tablet Week 1: Start with 25 mg at bed time. Week 2: Take 25 mg in the morning and 25 mg in the evening Week 3: Take 25 mg in the morning and 50 mg in the evening Week 4: Take 50 mg in the morning and 50 mg in the evening., Normal      triamcinolone (KENALOG) 0.5 % cream Apply 1 application. topically 2 (two) times a day, Starting Tue 6/4/2024, Until Wed 6/4/2025, Historical Med       !! - Potential duplicate medications found. Please discuss with provider.        No discharge procedures on file.  ED SEPSIS DOCUMENTATION   Time reflects when diagnosis was documented in both MDM as applicable and the Disposition within this note       Time User Action Codes Description Comment    4/6/2025 11:57 AM Chele Grimes Add [R11.10,  R19.7] Vomiting and diarrhea                  TANNER Scott  04/06/25 3290

## 2025-04-30 ENCOUNTER — HOSPITAL ENCOUNTER (OUTPATIENT)
Dept: CT IMAGING | Facility: HOSPITAL | Age: 40
Discharge: HOME | End: 2025-04-30
Payer: COMMERCIAL

## 2025-04-30 DIAGNOSIS — I72.9 ANEURYSM (HCC): ICD-10-CM

## 2025-04-30 PROCEDURE — 70496 CT ANGIOGRAPHY HEAD: CPT

## 2025-04-30 RX ADMIN — IOHEXOL 85 ML: 350 INJECTION, SOLUTION INTRAVENOUS at 13:13

## 2025-05-06 ENCOUNTER — TELEPHONE (OUTPATIENT)
Age: 40
End: 2025-05-06

## 2025-05-06 NOTE — TELEPHONE ENCOUNTER
Pt daughter (interprets for Mom) called and is requesting her moms 5/7/25 be made virtual. Can we advise her if that is possible please at ?  Thank You  Does the patient have video capabilities with their device? (such as a smartphone, tablet, computer with webcam, etc)   Yes       Will the patient be present for the virtual appointment?  Yes

## 2025-05-07 ENCOUNTER — TELEMEDICINE (OUTPATIENT)
Dept: NEUROSURGERY | Facility: CLINIC | Age: 40
End: 2025-05-07
Payer: COMMERCIAL

## 2025-05-07 ENCOUNTER — TELEPHONE (OUTPATIENT)
Dept: NEUROSURGERY | Facility: CLINIC | Age: 40
End: 2025-05-07

## 2025-05-07 DIAGNOSIS — I67.1 CEREBRAL ANEURYSM, NONRUPTURED: Primary | ICD-10-CM

## 2025-05-07 PROCEDURE — 99213 OFFICE O/P EST LOW 20 MIN: CPT | Performed by: PHYSICIAN ASSISTANT

## 2025-05-07 NOTE — TELEPHONE ENCOUNTER
5/7/25 patient transferred to central scheduling for cta head in 1 year to be scheduled advised to call back for a follow up.

## 2025-05-07 NOTE — PROGRESS NOTES
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    Patient is here today for 6-month follow-up with CTA head.  Image reports is stable 2 mm bilateral P-comm infundibulum, less favoring aneurysm.  Patient reported mild to moderate headache, following up with neurology and aching medications for headache.  Denies any vision issues, nausea, vomiting, seizures, weakness in the extremities, or gait problem.       Plan:    I reviewed the images and discussed follow-up imaging with the patient .  See her in 1 year with another CTA head with and without contrast.  Order placed  Vies patient to continue follow-up with neurology for headache management.  Consider follow-up.  Spacing or as needed if next image favors infundibulum.  Questions and concerns were answered to patient's satisfaction.  Patient verbalized understanding's and agreed with the plan.    History of Present Illness     HPI  See detailed discussion above  Review of Systems   Constitutional: Negative.    HENT: Negative.     Eyes: Negative.    Respiratory: Negative.     Cardiovascular: Negative.    Gastrointestinal: Negative.    Endocrine: Negative.    Genitourinary: Negative.    Musculoskeletal: Negative.    Skin: Negative.    Allergic/Immunologic: Negative.    Neurological:  Positive for dizziness and headaches.   Hematological: Negative.    Psychiatric/Behavioral: Negative.     All other systems reviewed and are negative.    Objective   There were no vitals taken for this visit.    Physical Exam  Constitutional:       Appearance: Normal appearance.    Neurological:      Mental Status: She is alert and oriented to person, place, and time.         Administrative Statements   Encounter provider Cam Mendenhall PA-C    The Patient is located at Home and in the following state in which I hold an active license [place].    The patient was identified by name and date of birth. Roseanne Maldonado was informed that this is a telemedicine visit and that the visit is being conducted through the [employer] platform. She agrees to proceed..  My office door was closed. No one else was in the room.  She acknowledged consent and understanding of privacy and security of the video platform. The patient has agreed to participate and understands they can discontinue the visit at any time.    I have spent a total time of 20 minutes in caring for this patient on the day of the visit/encounter including Diagnostic results, Prognosis, Risks and benefits of tx options, Instructions for management, Patient and family education, Importance of tx compliance, Risk factor reductions, Impressions, Documenting in the medical record, Reviewing/placing orders in the medical record (including tests, medications, and/or procedures), and Obtaining or reviewing history  , not including the time spent for establishing the audio/video connection.

## 2025-05-29 ENCOUNTER — TELEPHONE (OUTPATIENT)
Age: 40
End: 2025-05-29

## 2025-05-29 NOTE — TELEPHONE ENCOUNTER
Called pt lvm re: virtual visit, pt link sent 2x waited for pt 15 min pt did not log on. Left call back number to cc to r/s.

## 2025-08-14 ENCOUNTER — HOSPITAL ENCOUNTER (EMERGENCY)
Facility: HOSPITAL | Age: 40
Discharge: HOME | End: 2025-08-14
Attending: EMERGENCY MEDICINE | Admitting: EMERGENCY MEDICINE
Payer: COMMERCIAL

## 2025-08-14 VITALS
DIASTOLIC BLOOD PRESSURE: 84 MMHG | HEART RATE: 77 BPM | WEIGHT: 128 LBS | RESPIRATION RATE: 18 BRPM | TEMPERATURE: 97.9 F | BODY MASS INDEX: 22.68 KG/M2 | HEIGHT: 63 IN | SYSTOLIC BLOOD PRESSURE: 139 MMHG | OXYGEN SATURATION: 100 %

## 2025-08-14 DIAGNOSIS — G44.209 TENSION HEADACHE: ICD-10-CM

## 2025-08-14 DIAGNOSIS — M62.838 TRAPEZIUS MUSCLE SPASM: Primary | ICD-10-CM

## 2025-08-14 LAB
EXT PREGNANCY TEST URINE: NEGATIVE
EXT. CONTROL: NORMAL

## 2025-08-14 PROCEDURE — 93005 ELECTROCARDIOGRAM TRACING: CPT

## 2025-08-14 PROCEDURE — 99284 EMERGENCY DEPT VISIT MOD MDM: CPT

## 2025-08-14 PROCEDURE — 81025 URINE PREGNANCY TEST: CPT | Performed by: EMERGENCY MEDICINE

## 2025-08-14 PROCEDURE — 96375 TX/PRO/DX INJ NEW DRUG ADDON: CPT

## 2025-08-14 PROCEDURE — 96374 THER/PROPH/DIAG INJ IV PUSH: CPT

## 2025-08-14 PROCEDURE — 99284 EMERGENCY DEPT VISIT MOD MDM: CPT | Performed by: EMERGENCY MEDICINE

## 2025-08-14 RX ORDER — DIAZEPAM 10 MG/2ML
2 INJECTION, SOLUTION INTRAMUSCULAR; INTRAVENOUS ONCE
Status: COMPLETED | OUTPATIENT
Start: 2025-08-14 | End: 2025-08-14

## 2025-08-14 RX ORDER — KETOROLAC TROMETHAMINE 30 MG/ML
15 INJECTION, SOLUTION INTRAMUSCULAR; INTRAVENOUS ONCE
Status: COMPLETED | OUTPATIENT
Start: 2025-08-14 | End: 2025-08-14

## 2025-08-14 RX ORDER — METOCLOPRAMIDE HYDROCHLORIDE 5 MG/ML
10 INJECTION INTRAMUSCULAR; INTRAVENOUS ONCE
Status: COMPLETED | OUTPATIENT
Start: 2025-08-14 | End: 2025-08-14

## 2025-08-14 RX ORDER — DIPHENHYDRAMINE HYDROCHLORIDE 50 MG/ML
25 INJECTION, SOLUTION INTRAMUSCULAR; INTRAVENOUS ONCE
Status: COMPLETED | OUTPATIENT
Start: 2025-08-14 | End: 2025-08-14

## 2025-08-14 RX ORDER — LIDOCAINE 50 MG/G
1 PATCH TOPICAL ONCE
Status: DISCONTINUED | OUTPATIENT
Start: 2025-08-14 | End: 2025-08-14 | Stop reason: HOSPADM

## 2025-08-14 RX ADMIN — METOCLOPRAMIDE 10 MG: 5 INJECTION, SOLUTION INTRAMUSCULAR; INTRAVENOUS at 19:27

## 2025-08-14 RX ADMIN — KETOROLAC TROMETHAMINE 15 MG: 30 INJECTION, SOLUTION INTRAMUSCULAR at 19:27

## 2025-08-14 RX ADMIN — DIPHENHYDRAMINE HYDROCHLORIDE 25 MG: 50 INJECTION, SOLUTION INTRAMUSCULAR; INTRAVENOUS at 19:27

## 2025-08-14 RX ADMIN — DIAZEPAM 2 MG: 5 INJECTION, SOLUTION INTRAMUSCULAR; INTRAVENOUS at 20:32

## 2025-08-14 RX ADMIN — LIDOCAINE 5% 1 PATCH: 700 PATCH TOPICAL at 20:32

## 2025-08-15 LAB
ATRIAL RATE: 70 BPM
P AXIS: 84 DEGREES
PR INTERVAL: 138 MS
QRS AXIS: 78 DEGREES
QRSD INTERVAL: 88 MS
QT INTERVAL: 370 MS
QTC INTERVAL: 399 MS
T WAVE AXIS: 72 DEGREES
VENTRICULAR RATE: 70 BPM

## 2025-08-15 PROCEDURE — 93010 ELECTROCARDIOGRAM REPORT: CPT | Performed by: INTERNAL MEDICINE
